# Patient Record
Sex: FEMALE | Race: OTHER | HISPANIC OR LATINO | Employment: OTHER | URBAN - METROPOLITAN AREA
[De-identification: names, ages, dates, MRNs, and addresses within clinical notes are randomized per-mention and may not be internally consistent; named-entity substitution may affect disease eponyms.]

---

## 2018-02-27 ENCOUNTER — APPOINTMENT (EMERGENCY)
Dept: CT IMAGING | Facility: HOSPITAL | Age: 68
End: 2018-02-27
Payer: COMMERCIAL

## 2018-02-27 ENCOUNTER — HOSPITAL ENCOUNTER (EMERGENCY)
Facility: HOSPITAL | Age: 68
Discharge: HOME | End: 2018-02-27
Attending: EMERGENCY MEDICINE | Admitting: EMERGENCY MEDICINE
Payer: COMMERCIAL

## 2018-02-27 VITALS
TEMPERATURE: 98.3 F | DIASTOLIC BLOOD PRESSURE: 68 MMHG | SYSTOLIC BLOOD PRESSURE: 134 MMHG | OXYGEN SATURATION: 97 % | RESPIRATION RATE: 18 BRPM | HEART RATE: 66 BPM

## 2018-02-27 DIAGNOSIS — N12 PYELONEPHRITIS: Primary | ICD-10-CM

## 2018-02-27 DIAGNOSIS — N30.90 CYSTITIS: ICD-10-CM

## 2018-02-27 LAB
ALBUMIN SERPL BCP-MCNC: 4.3 G/DL (ref 3.5–5)
ALP SERPL-CCNC: 114 U/L (ref 46–116)
ALT SERPL W P-5'-P-CCNC: 20 U/L (ref 12–78)
ANION GAP SERPL CALCULATED.3IONS-SCNC: 9 MMOL/L (ref 4–13)
AST SERPL W P-5'-P-CCNC: 26 U/L (ref 5–45)
BACTERIA UR QL AUTO: ABNORMAL /HPF
BASOPHILS # BLD AUTO: 0.05 THOUSANDS/ΜL (ref 0–0.1)
BASOPHILS NFR BLD AUTO: 1 % (ref 0–1)
BILIRUB SERPL-MCNC: 0.8 MG/DL (ref 0.2–1)
BILIRUB UR QL STRIP: NEGATIVE
BUN SERPL-MCNC: 11 MG/DL (ref 5–25)
CALCIUM SERPL-MCNC: 10.2 MG/DL (ref 8.3–10.1)
CHLORIDE SERPL-SCNC: 102 MMOL/L (ref 100–108)
CLARITY UR: ABNORMAL
CO2 SERPL-SCNC: 29 MMOL/L (ref 21–32)
COLOR UR: YELLOW
CREAT SERPL-MCNC: 0.82 MG/DL (ref 0.6–1.3)
EOSINOPHIL # BLD AUTO: 0.25 THOUSAND/ΜL (ref 0–0.61)
EOSINOPHIL NFR BLD AUTO: 5 % (ref 0–6)
ERYTHROCYTE [DISTWIDTH] IN BLOOD BY AUTOMATED COUNT: 13.8 % (ref 11.6–15.1)
GFR SERPL CREATININE-BSD FRML MDRD: 75 ML/MIN/1.73SQ M
GLUCOSE SERPL-MCNC: 86 MG/DL (ref 65–140)
GLUCOSE UR STRIP-MCNC: NEGATIVE MG/DL
HCT VFR BLD AUTO: 39 % (ref 34.8–46.1)
HGB BLD-MCNC: 13.4 G/DL (ref 11.5–15.4)
HGB UR QL STRIP.AUTO: ABNORMAL
KETONES UR STRIP-MCNC: NEGATIVE MG/DL
LEUKOCYTE ESTERASE UR QL STRIP: ABNORMAL
LIPASE SERPL-CCNC: 157 U/L (ref 73–393)
LYMPHOCYTES # BLD AUTO: 1.5 THOUSANDS/ΜL (ref 0.6–4.47)
LYMPHOCYTES NFR BLD AUTO: 30 % (ref 14–44)
MCH RBC QN AUTO: 28.2 PG (ref 26.8–34.3)
MCHC RBC AUTO-ENTMCNC: 34.4 G/DL (ref 31.4–37.4)
MCV RBC AUTO: 82 FL (ref 82–98)
MONOCYTES # BLD AUTO: 0.4 THOUSAND/ΜL (ref 0.17–1.22)
MONOCYTES NFR BLD AUTO: 8 % (ref 4–12)
NEUTROPHILS # BLD AUTO: 2.83 THOUSANDS/ΜL (ref 1.85–7.62)
NEUTS SEG NFR BLD AUTO: 56 % (ref 43–75)
NITRITE UR QL STRIP: POSITIVE
NON-SQ EPI CELLS URNS QL MICRO: ABNORMAL /HPF
NRBC BLD AUTO-RTO: 0 /100 WBCS
PH UR STRIP.AUTO: 7.5 [PH] (ref 4.5–8)
PLATELET # BLD AUTO: 205 THOUSANDS/UL (ref 149–390)
PMV BLD AUTO: 8.8 FL (ref 8.9–12.7)
POTASSIUM SERPL-SCNC: 4.7 MMOL/L (ref 3.5–5.3)
PROT SERPL-MCNC: 8.6 G/DL (ref 6.4–8.2)
PROT UR STRIP-MCNC: NEGATIVE MG/DL
RBC # BLD AUTO: 4.75 MILLION/UL (ref 3.81–5.12)
RBC #/AREA URNS AUTO: ABNORMAL /HPF
SODIUM SERPL-SCNC: 140 MMOL/L (ref 136–145)
SP GR UR STRIP.AUTO: 1.01 (ref 1–1.03)
UROBILINOGEN UR QL STRIP.AUTO: 0.2 E.U./DL
WBC # BLD AUTO: 5.03 THOUSAND/UL (ref 4.31–10.16)
WBC #/AREA URNS AUTO: ABNORMAL /HPF

## 2018-02-27 PROCEDURE — 87077 CULTURE AEROBIC IDENTIFY: CPT

## 2018-02-27 PROCEDURE — 99284 EMERGENCY DEPT VISIT MOD MDM: CPT

## 2018-02-27 PROCEDURE — 96365 THER/PROPH/DIAG IV INF INIT: CPT

## 2018-02-27 PROCEDURE — 96361 HYDRATE IV INFUSION ADD-ON: CPT

## 2018-02-27 PROCEDURE — 87086 URINE CULTURE/COLONY COUNT: CPT

## 2018-02-27 PROCEDURE — 80053 COMPREHEN METABOLIC PANEL: CPT | Performed by: EMERGENCY MEDICINE

## 2018-02-27 PROCEDURE — 96375 TX/PRO/DX INJ NEW DRUG ADDON: CPT

## 2018-02-27 PROCEDURE — 81002 URINALYSIS NONAUTO W/O SCOPE: CPT | Performed by: EMERGENCY MEDICINE

## 2018-02-27 PROCEDURE — 74177 CT ABD & PELVIS W/CONTRAST: CPT

## 2018-02-27 PROCEDURE — 85025 COMPLETE CBC W/AUTO DIFF WBC: CPT | Performed by: EMERGENCY MEDICINE

## 2018-02-27 PROCEDURE — 83690 ASSAY OF LIPASE: CPT | Performed by: EMERGENCY MEDICINE

## 2018-02-27 PROCEDURE — 81001 URINALYSIS AUTO W/SCOPE: CPT

## 2018-02-27 PROCEDURE — 36415 COLL VENOUS BLD VENIPUNCTURE: CPT | Performed by: EMERGENCY MEDICINE

## 2018-02-27 PROCEDURE — 87186 SC STD MICRODIL/AGAR DIL: CPT

## 2018-02-27 RX ORDER — MULTIVITAMIN
1 TABLET ORAL DAILY
COMMUNITY
End: 2018-07-15

## 2018-02-27 RX ORDER — KETOROLAC TROMETHAMINE 30 MG/ML
30 INJECTION, SOLUTION INTRAMUSCULAR; INTRAVENOUS ONCE
Status: COMPLETED | OUTPATIENT
Start: 2018-02-27 | End: 2018-02-27

## 2018-02-27 RX ORDER — CEPHALEXIN 500 MG/1
500 CAPSULE ORAL EVERY 8 HOURS SCHEDULED
Qty: 42 CAPSULE | Refills: 0 | Status: SHIPPED | OUTPATIENT
Start: 2018-02-27 | End: 2018-03-13 | Stop reason: ALTCHOICE

## 2018-02-27 RX ORDER — PHENAZOPYRIDINE HYDROCHLORIDE 200 MG/1
200 TABLET, FILM COATED ORAL 3 TIMES DAILY
Qty: 6 TABLET | Refills: 0 | Status: SHIPPED | OUTPATIENT
Start: 2018-02-27 | End: 2018-05-03 | Stop reason: SDUPTHER

## 2018-02-27 RX ADMIN — CEFTRIAXONE 1000 MG: 1 INJECTION, SOLUTION INTRAVENOUS at 19:45

## 2018-02-27 RX ADMIN — SODIUM CHLORIDE 1000 ML: 0.9 INJECTION, SOLUTION INTRAVENOUS at 18:25

## 2018-02-27 RX ADMIN — IOHEXOL 100 ML: 350 INJECTION, SOLUTION INTRAVENOUS at 19:08

## 2018-02-27 RX ADMIN — KETOROLAC TROMETHAMINE 30 MG: 30 INJECTION, SOLUTION INTRAMUSCULAR at 19:45

## 2018-02-27 NOTE — ED ATTENDING ATTESTATION
Lieutenant Ajay MD, saw and evaluated the patient  I have discussed the patient with the resident/non-physician practitioner and agree with the resident's/non-physician practitioner's findings, Plan of Care, and MDM as documented in the resident's/non-physician practitioner's note, except where noted  All available labs and Radiology studies were reviewed  At this point I agree with the current assessment done in the Emergency Department  I have conducted an independent evaluation of this patient a history and physical is as follows:    77 YO female presents with 3 months of foul smelling urine, burning with urination, itching with no rashes  States this has been getting worse  States she had flank pain starting 2 weeks ago, subjective fevers and chills  Gen: Pt is in NAD  HEENT: Head is atraumatic, EOM's intact, neck has FROM  Chest: CTAB, non-tender  Heart: RRR  Abdomen: Soft, NT/ND, Left greater than Right CVA tenderness  Musculoskeletal: FROM in all extremities  Skin: No rash, no ecchymosis  Neuro: Awake, alert, oriented x4; Cranial nerves II-XII intact  Psych: Normal affect    MDM - Will check urine for infection, kidney function, CT for kidney stone        Critical Care Time  CritCare Time    Procedures

## 2018-02-28 NOTE — DISCHARGE INSTRUCTIONS
Kidney Infection   WHAT YOU NEED TO KNOW:   A kidney infection, or pyelonephritis, is a bacterial infection  The infection usually starts in your bladder or urethra and moves into your kidney  One or both kidneys may be infected  DISCHARGE INSTRUCTIONS:   Return to the emergency department if:   · You have a fever and chills  · You cannot stop vomiting  · You have severe pain in your abdomen, lower back, or sides  Contact your healthcare provider if:   · You continue to have a fever after you take antibiotics for 3 days  · You have pain when you urinate, even after treatment  · Your signs and symptoms return  · You have questions or concerns about your condition or care  Medicines: You may  need any of the following:  · Antibiotics  treat your bacterial infection  · Acetaminophen  decreases pain and fever  It is available without a doctor's order  Ask how much to take and how often to take it  Follow directions  Read the labels of all other medicines you are using to see if they also contain acetaminophen, or ask your doctor or pharmacist  Acetaminophen can cause liver damage if not taken correctly  Do not use more than 4 grams (4,000 milligrams) total of acetaminophen in one day  · NSAIDs , such as ibuprofen, help decrease swelling, pain, and fever  This medicine is available with or without a doctor's order  NSAIDs can cause stomach bleeding or kidney problems in certain people  If you take blood thinner medicine, always ask if NSAIDs are safe for you  Always read the medicine label and follow directions  Do not give these medicines to children under 10months of age without direction from your child's healthcare provider  · Prescription pain medicine  may be given  Ask how to take this medicine safely  · Take your medicine as directed  Contact your healthcare provider if you think your medicine is not helping or if you have side effects   Tell him of her if you are allergic to any medicine  Keep a list of the medicines, vitamins, and herbs you take  Include the amounts, and when and why you take them  Bring the list or the pill bottles to follow-up visits  Carry your medicine list with you in case of an emergency  Drink liquids as directed: You may need to drink extra liquids to help flush your kidneys and urinary system  Water is the best liquid to drink  Ask your healthcare provider how much liquid to drink each day and which liquids are best for you  Urinate as soon as you feel the urge: This will help flush bacteria from your urinary system  Do not wait or hold your urine for too long  Clean your genital area every day with soap and water:  Wipe from front to back after you urinate or have a bowel movement  Wear cotton underwear  Fabrics such as nylon and polyester can stay damp  This can increase your risk for infection  Urinate within 15 minutes after you have sex  Follow up with your healthcare provider as directed:  Write down your questions so you remember to ask them during your visits  © 2017 [facility] Information is for End User's use only and may not be sold, redistributed or otherwise used for commercial purposes  All illustrations and images included in CareNotes® are the copyrighted property of A D A M , Inc  or Abilio Ariza  The above information is an  only  It is not intended as medical advice for individual conditions or treatments  Talk to your doctor, nurse or pharmacist before following any medical regimen to see if it is safe and effective for you

## 2018-02-28 NOTE — ED PROVIDER NOTES
History  Chief Complaint   Patient presents with    Flank Pain     B/L flank pain for 2 weeks, foul smelling urine, slight burning with urination  Denies fever  This is a 77 yo F who presents with urinary compliants and bilateral flank pain  Patient states for 3 months she has been having dysuria, foul smelling urine, itching around urethra  States for past 2 week bilateral flank pain left worse than right  States no fevers or chills  No abdominal pain  No nausea or vomiting  States her insurance in ending on the 1st so wanted to get evaluated  No vaginal discharge or bleeding  No chest pain or shortness of breath  Impression: 77 yo F with urinary complaints and back pain  Will get labs urine and CT scan  Treat sympatomatically most likely pyelonephritis             Prior to Admission Medications   Prescriptions Last Dose Informant Patient Reported? Taking? Multiple Vitamin (MULTIVITAMIN) tablet   Yes Yes   Sig: Take 1 tablet by mouth daily      Facility-Administered Medications: None       No past medical history on file  No past surgical history on file  No family history on file  I have reviewed and agree with the history as documented  Social History   Substance Use Topics    Smoking status: Not on file    Smokeless tobacco: Not on file    Alcohol use Not on file        Review of Systems   Constitutional: Negative  HENT: Negative  Eyes: Negative  Respiratory: Negative  Cardiovascular: Negative  Gastrointestinal: Negative  Endocrine: Negative  Genitourinary: Positive for dysuria, flank pain and urgency  Negative for vaginal discharge and vaginal pain  Neurological: Negative  Hematological: Negative  Psychiatric/Behavioral: Negative  All other systems reviewed and are negative        Physical Exam  ED Triage Vitals [02/27/18 1652]   Temperature Pulse Respirations Blood Pressure SpO2   98 3 °F (36 8 °C) 77 18 160/80 99 %      Temp Source Heart Rate Source Patient Position - Orthostatic VS BP Location FiO2 (%)   Temporal Monitor Sitting Right arm --      Pain Score       8           Orthostatic Vital Signs  Vitals:    02/27/18 1652 02/27/18 1949   BP: 160/80 134/68   Pulse: 77 66   Patient Position - Orthostatic VS: Sitting Lying       Physical Exam   Constitutional: She is oriented to person, place, and time  She appears well-developed and well-nourished  No distress  HENT:   Head: Normocephalic  Nose: Nose normal    Mouth/Throat: Oropharynx is clear and moist  No oropharyngeal exudate  Eyes: Conjunctivae and EOM are normal  Pupils are equal, round, and reactive to light  Neck: Normal range of motion  Neck supple  Cardiovascular: Normal rate, regular rhythm and normal heart sounds  No murmur heard  Pulmonary/Chest: Effort normal and breath sounds normal  No respiratory distress  Abdominal: Soft  Bowel sounds are normal  She exhibits no distension and no mass  There is no tenderness  There is no rebound and no guarding  Musculoskeletal: Normal range of motion  She exhibits no edema or deformity  Left cva tenderness   No rash, no midline tenderness     Neurological: She is alert and oriented to person, place, and time  Skin: Skin is warm  Capillary refill takes less than 2 seconds  She is not diaphoretic  Psychiatric: She has a normal mood and affect  Her behavior is normal    Vitals reviewed        ED Medications  Medications   sodium chloride 0 9 % bolus 1,000 mL (0 mL Intravenous Stopped 2/27/18 1915)   cefTRIAXone (ROCEPHIN) IVPB (premix) 1,000 mg (0 mg Intravenous Stopped 2/27/18 2045)   iohexol (OMNIPAQUE) 350 MG/ML injection (SINGLE-DOSE) 100 mL (100 mL Intravenous Given 2/27/18 1908)   ketorolac (TORADOL) injection 30 mg (30 mg Intravenous Given 2/27/18 1945)       Diagnostic Studies  Results Reviewed     Procedure Component Value Units Date/Time    Urine Microscopic [[id number]]  (Abnormal) Collected:  02/27/18 1815    Lab Status:  Final result Specimen:  Urine from Urine, Clean Catch Updated:  02/27/18 1849     RBC, UA 1-2 (A) /hpf      WBC, UA 10-20 (A) /hpf      Epithelial Cells Occasional /hpf      Bacteria, UA Moderate (A) /hpf     Urine culture [[id number]] Collected:  02/27/18 1815    Lab Status: In process Specimen:  Urine from Urine, Clean Catch Updated:  02/27/18 1849    Comprehensive metabolic panel [[id number]]  (Abnormal) Collected:  02/27/18 1825    Lab Status:  Final result Specimen:  Blood from Arm, Left Updated:  02/27/18 1847     Sodium 140 mmol/L      Potassium 4 7 mmol/L      Chloride 102 mmol/L      CO2 29 mmol/L      Anion Gap 9 mmol/L      BUN 11 mg/dL      Creatinine 0 82 mg/dL      Glucose 86 mg/dL      Calcium 10 2 (H) mg/dL      AST 26 U/L      ALT 20 U/L      Alkaline Phosphatase 114 U/L      Total Protein 8 6 (H) g/dL      Albumin 4 3 g/dL      Total Bilirubin 0 80 mg/dL      eGFR 75 ml/min/1 73sq m     Narrative:         National Kidney Disease Education Program recommendations are as follows:  GFR calculation is accurate only with a steady state creatinine  Chronic Kidney disease less than 60 ml/min/1 73 sq  meters  Kidney failure less than 15 ml/min/1 73 sq  meters      Lipase [[id number]]  (Normal) Collected:  02/27/18 1825    Lab Status:  Final result Specimen:  Blood from Arm, Left Updated:  02/27/18 1847     Lipase 157 u/L     CBC and differential [[id number]]  (Abnormal) Collected:  02/27/18 1825    Lab Status:  Final result Specimen:  Blood from Arm, Left Updated:  02/27/18 1832     WBC 5 03 Thousand/uL      RBC 4 75 Million/uL      Hemoglobin 13 4 g/dL      Hematocrit 39 0 %      MCV 82 fL      MCH 28 2 pg      MCHC 34 4 g/dL      RDW 13 8 %      MPV 8 8 (L) fL      Platelets 254 Thousands/uL      nRBC 0 /100 WBCs      Neutrophils Relative 56 %      Lymphocytes Relative 30 %      Monocytes Relative 8 %      Eosinophils Relative 5 %      Basophils Relative 1 %      Neutrophils Absolute 2 83 Thousands/µL      Lymphocytes Absolute 1 50 Thousands/µL      Monocytes Absolute 0 40 Thousand/µL      Eosinophils Absolute 0 25 Thousand/µL      Basophils Absolute 0 05 Thousands/µL     POCT urinalysis dipstick [[id number]]  (Abnormal) Resulted:  02/27/18 1825    Lab Status:  Final result Specimen:  Urine Updated:  02/27/18 1825    ED Urine Macroscopic [[id number]]  (Abnormal) Collected:  02/27/18 1815    Lab Status:  Final result Specimen:  Urine Updated:  02/27/18 1813     Color, UA Yellow     Clarity, UA Cloudy     pH, UA 7 5     Leukocytes, UA Moderate (A)     Nitrite, UA Positive (A)     Protein, UA Negative mg/dl      Glucose, UA Negative mg/dl      Ketones, UA Negative mg/dl      Urobilinogen, UA 0 2 E U /dl      Bilirubin, UA Negative     Blood, UA Trace (A)     Specific [place], UA 1 015    Narrative:       CLINITEK RESULT                 CT abdomen pelvis with contrast   Final Result by Anika Fletcher MD (02/27 1934)      Diffuse bladder wall thickening could relate to cystitis, correlate with urinalysis  There is markedly distended bladder, consider placement of portacatheter  Colonic diverticulosis  Pulmonary nodules as described above  Based on current Fleischner Society 2017 Guidelines on incidental pulmonary nodule, followup non-contrast CT is recommended at 6-12 months from the initial examination and, if stable at that time, an additional    followup is recommended for 18-24 months from the initial examination  Age-indeterminate superior endplate depression/compression of T11 and T9, worse at T9  Workstation performed: [id number]               Procedures  Procedures      Phone Consults  ED Phone Contact    ED Course  ED Course as of Feb 27 2323   Tue Feb 27, 2018   1851 WBC, UA: (!) 10-20   1943 Will discharge and advise to followup for lung nodule and compression of thoracic spine   If any worsening of symptoms return to the ED                                 MDM  CritCare Time    Disposition  Final diagnoses: Pyelonephritis   Cystitis     Time reflects when diagnosis was documented in both MDM as applicable and the Disposition within this note     Time User Action Codes Description Comment    2/27/2018  7:43 PM Mariela TaylorJanemari U  8  [N12] Pyelonephritis     2/27/2018  7:43 PM Mariela TaylorFahad  Miraclemaribel 61 Add [N30 90] Cystitis       ED Disposition     ED Disposition Condition Comment    Discharge  Bulmaro Hoskins discharge to home/self care  Condition at discharge: Good        Follow-up Information     Follow up With Specialties Details Why Contact Info    [facility]  Schedule an appointment as soon as possible for a visit to find a pcp and regarding incidental finding of lung nodule  [phone]      Gabby Shaw MD Orthopedic Surgery Schedule an appointment as soon as possible for a visit As needed for compression of back  [address]  [address] [place] [address]  [phone]          Discharge Medication List as of 2/27/2018  7:45 PM      START taking these medications    Details   cephalexin (KEFLEX) 500 mg capsule Take 1 capsule (500 mg total) by mouth every 8 (eight) hours for 14 days, Starting Tue 2/27/2018, Until Tue 3/13/2018, Print      phenazopyridine (PYRIDIUM) 200 mg tablet Take 1 tablet (200 mg total) by mouth 3 (three) times a day, Starting Tue 2/27/2018, Print         CONTINUE these medications which have NOT CHANGED    Details   Multiple Vitamin (MULTIVITAMIN) tablet Take 1 tablet by mouth daily, Historical Med           No discharge procedures on file  ED Provider  Attending physically available and evaluated Bulmaro Hoskins  BRYCE managed the patient along with the ED Attending      Electronically Signed by         Leva Bosworth, MD  02/27/18 7949

## 2018-03-01 LAB — BACTERIA UR CULT: ABNORMAL

## 2018-03-03 NOTE — PROGRESS NOTES
Called to see how patient was feeling while taking antibiotics and see if symptoms were resolving  Keflex is intermediate for treating bacteria  No answer   LMOM x1

## 2018-03-06 ENCOUNTER — APPOINTMENT (OUTPATIENT)
Dept: URGENT CARE | Facility: MEDICAL CENTER | Age: 68
End: 2018-03-06
Payer: MEDICARE

## 2018-03-06 ENCOUNTER — OFFICE VISIT (OUTPATIENT)
Dept: URGENT CARE | Facility: MEDICAL CENTER | Age: 68
End: 2018-03-06

## 2018-03-06 VITALS
TEMPERATURE: 98.7 F | DIASTOLIC BLOOD PRESSURE: 80 MMHG | SYSTOLIC BLOOD PRESSURE: 106 MMHG | WEIGHT: 137 LBS | OXYGEN SATURATION: 100 % | HEIGHT: 59 IN | RESPIRATION RATE: 18 BRPM | BODY MASS INDEX: 27.62 KG/M2 | HEART RATE: 60 BPM

## 2018-03-06 DIAGNOSIS — L63.9 ALOPECIA AREATA: Primary | ICD-10-CM

## 2018-03-06 NOTE — PATIENT INSTRUCTIONS
Scalp lesions are consistent with alopecia areata  Patient given reassurance  Patient given a list of dermatologist to refer to

## 2018-03-06 NOTE — PROGRESS NOTES
[facility]        NAME: Sherren Corrigan is a 78 y o  female  : 1950    MRN: [id number]  DATE: 2018  TIME: 3:09 PM    Assessment and Plan   Alopecia areata [L63 9]  1  Alopecia areata           Patient Instructions       Follow up with PCP in 3-5 days  Proceed to  ER if symptoms worsen  Chief Complaint     Chief Complaint   Patient presents with    Rash     vs bald spot at crown of head; no pruritis or pain         History of Present Illness       Patient here today with concerns about 2 bald spots in the back of her scalp that was noted by the patient's   Patient does not recall having any itching or tenderness were the ball spot of appear  She wishes to have them looked at  No previous history of her loss  Denies any recent history of stress  She informs me that she was recently treated for UTI  Rash         Review of Systems   Review of Systems   Constitutional: Negative  Skin: Positive for rash  Current Medications       Current Outpatient Prescriptions:     cephalexin (KEFLEX) 500 mg capsule, Take 1 capsule (500 mg total) by mouth every 8 (eight) hours for 14 days, Disp: 42 capsule, Rfl: 0    Multiple Vitamin (MULTIVITAMIN) tablet, Take 1 tablet by mouth daily, Disp: , Rfl:     phenazopyridine (PYRIDIUM) 200 mg tablet, Take 1 tablet (200 mg total) by mouth 3 (three) times a day, Disp: 6 tablet, Rfl: 0    Current Allergies     Allergies as of 2018 - Reviewed 2018   Allergen Reaction Noted    Ciprofloxacin  2018    Sulfa antibiotics  2018            The following portions of the patient's history were reviewed and updated as appropriate: allergies, current medications, past family history, past medical history, past social history, past surgical history and problem list      No past medical history on file  No past surgical history on file  No family history on file  Medications have been verified          Objective /80   Pulse 60   Temp 98 7 °F (37 1 °C)   Resp 18   Ht 4' 11" (1 499 m)   Wt 62 1 kg (137 lb)   SpO2 100%   BMI 27 67 kg/m²        Physical Exam     Physical Exam   Skin:   For test scalp reveals an area of alopecia measuring about 15 mm in size  The secondary area of alopecia measuring 5 mm is posterior to the 1st lesion  Findings are consistent with alopecia areata   Nursing note and vitals reviewed

## 2018-03-26 ENCOUNTER — OFFICE VISIT (OUTPATIENT)
Dept: FAMILY MEDICINE CLINIC | Facility: CLINIC | Age: 68
End: 2018-03-26
Payer: MEDICARE

## 2018-03-26 ENCOUNTER — APPOINTMENT (OUTPATIENT)
Dept: LAB | Facility: HOSPITAL | Age: 68
End: 2018-03-26
Payer: MEDICARE

## 2018-03-26 VITALS
TEMPERATURE: 98 F | HEART RATE: 60 BPM | WEIGHT: 135 LBS | BODY MASS INDEX: 31.24 KG/M2 | RESPIRATION RATE: 18 BRPM | SYSTOLIC BLOOD PRESSURE: 116 MMHG | HEIGHT: 55 IN | DIASTOLIC BLOOD PRESSURE: 70 MMHG

## 2018-03-26 DIAGNOSIS — Z12.39 BREAST CANCER SCREENING: ICD-10-CM

## 2018-03-26 DIAGNOSIS — F51.01 PRIMARY INSOMNIA: ICD-10-CM

## 2018-03-26 DIAGNOSIS — L63.9 ALOPECIA AREATA: ICD-10-CM

## 2018-03-26 DIAGNOSIS — K21.9 GASTROESOPHAGEAL REFLUX DISEASE WITHOUT ESOPHAGITIS: ICD-10-CM

## 2018-03-26 DIAGNOSIS — N30.00 ACUTE CYSTITIS WITHOUT HEMATURIA: Primary | ICD-10-CM

## 2018-03-26 DIAGNOSIS — E66.09 CLASS 1 OBESITY DUE TO EXCESS CALORIES WITHOUT SERIOUS COMORBIDITY WITH BODY MASS INDEX (BMI) OF 31.0 TO 31.9 IN ADULT: ICD-10-CM

## 2018-03-26 PROBLEM — E66.811 CLASS 1 OBESITY IN ADULT: Status: ACTIVE | Noted: 2018-03-26

## 2018-03-26 PROBLEM — E66.9 CLASS 1 OBESITY IN ADULT: Status: ACTIVE | Noted: 2018-03-26

## 2018-03-26 LAB
CHOLEST SERPL-MCNC: 220 MG/DL (ref 50–200)
HDLC SERPL-MCNC: 55 MG/DL (ref 40–60)
LDLC SERPL CALC-MCNC: 130 MG/DL (ref 0–100)
SL AMB  POCT GLUCOSE, UA: ABNORMAL
SL AMB LEUKOCYTE ESTERASE,UA: ABNORMAL
SL AMB POCT BILIRUBIN,UA: ABNORMAL
SL AMB POCT BLOOD,UA: 50
SL AMB POCT CLARITY,UA: ABNORMAL
SL AMB POCT COLOR,UA: YELLOW
SL AMB POCT KETONES,UA: 5
SL AMB POCT NITRITE,UA: ABNORMAL
SL AMB POCT PH,UA: 5
SL AMB POCT SPECIFIC GRAVITY,UA: 1.01
SL AMB POCT URINE PROTEIN: 30
SL AMB POCT UROBILINOGEN: 0.2
TRIGL SERPL-MCNC: 173 MG/DL
TSH SERPL DL<=0.05 MIU/L-ACNC: 2 UIU/ML (ref 0.36–3.74)

## 2018-03-26 PROCEDURE — 87077 CULTURE AEROBIC IDENTIFY: CPT | Performed by: NURSE PRACTITIONER

## 2018-03-26 PROCEDURE — 81002 URINALYSIS NONAUTO W/O SCOPE: CPT | Performed by: NURSE PRACTITIONER

## 2018-03-26 PROCEDURE — 87186 SC STD MICRODIL/AGAR DIL: CPT | Performed by: NURSE PRACTITIONER

## 2018-03-26 PROCEDURE — 87086 URINE CULTURE/COLONY COUNT: CPT | Performed by: NURSE PRACTITIONER

## 2018-03-26 PROCEDURE — 80061 LIPID PANEL: CPT | Performed by: NURSE PRACTITIONER

## 2018-03-26 PROCEDURE — 36415 COLL VENOUS BLD VENIPUNCTURE: CPT | Performed by: NURSE PRACTITIONER

## 2018-03-26 PROCEDURE — 84443 ASSAY THYROID STIM HORMONE: CPT | Performed by: NURSE PRACTITIONER

## 2018-03-26 PROCEDURE — 99204 OFFICE O/P NEW MOD 45 MIN: CPT | Performed by: NURSE PRACTITIONER

## 2018-03-26 RX ORDER — NITROFURANTOIN 25; 75 MG/1; MG/1
100 CAPSULE ORAL 2 TIMES DAILY
Qty: 14 CAPSULE | Refills: 0 | Status: SHIPPED | OUTPATIENT
Start: 2018-03-26 | End: 2018-04-02

## 2018-03-26 RX ORDER — OMEPRAZOLE 20 MG/1
20 CAPSULE, DELAYED RELEASE ORAL DAILY
Qty: 30 CAPSULE | Refills: 0 | Status: SHIPPED | OUTPATIENT
Start: 2018-03-26 | End: 2018-08-09 | Stop reason: SDUPTHER

## 2018-03-26 RX ORDER — CALCIUM CARBONATE 750 MG/1
1 TABLET, CHEWABLE ORAL DAILY
COMMUNITY
End: 2018-07-15

## 2018-03-26 RX ORDER — TRAZODONE HYDROCHLORIDE 50 MG/1
50 TABLET ORAL
Qty: 30 TABLET | Refills: 0 | Status: SHIPPED | OUTPATIENT
Start: 2018-03-26 | End: 2018-07-15

## 2018-03-26 NOTE — PROGRESS NOTES
Chief Complaint   Patient presents with    Follow-up     ER follow-up on urine infection  patient finished her antibotics but now her urine is back to being cloudy and vaginal itching    Back Pain     only when she is moving around  Assessment/Plan:    Gastroesophageal reflux disease without esophagitis  Was previous on Nexium for peptic ulcer but was resolved  Primary insomnia  Trial Trazodone for sleep  Diagnoses and all orders for this visit:    Acute cystitis without hematuria  -     POCT urine dip  -     Urine culture  -     nitrofurantoin (MACROBID) 100 mg capsule; Take 1 capsule (100 mg total) by mouth 2 (two) times a day for 7 days    Gastroesophageal reflux disease without esophagitis  -     omeprazole (PriLOSEC) 20 mg delayed release capsule; Take 1 capsule (20 mg total) by mouth daily    Primary insomnia  -     traZODone (DESYREL) 50 mg tablet; Take 1 tablet (50 mg total) by mouth daily at bedtime as needed for sleep  -     TSH, 3rd generation    Breast cancer screening  -     Mammo screening bilateral w 3d & cad; Future    Class 1 obesity due to excess calories without serious comorbidity with body mass index (BMI) of 31 0 to 31 9 in adult  -     Lipid panel  -     TSH, 3rd generation    Alopecia areata  -     TSH, 3rd generation    Other orders  -     calcium carbonate (TUMS EX) 750 mg chewable tablet; Chew 1 tablet daily          Subjective:      Patient ID: Dennis Garcia is a 78 y o  female  Patient states she is not sleeping well only getting 2-3 hours a night  She also states her  is giving her stress and she feels anxious  She states she has tried some over the counter medication such as melatonin  She was using diazepam when she was in [place] years ago  Back Pain   This is a new problem  The current episode started more than 1 month ago (5-6 weeks )  The problem occurs constantly   The pain is present in the lumbar spine and thoracic spine (left side)  Radiates to: stomache  The symptoms are aggravated by position and lying down  Associated symptoms include abdominal pain and pelvic pain (feels pinching )  Pertinent negatives include no bladder incontinence, chest pain, fever, leg pain, numbness or paresthesias  Risk factors include menopause (she states she fell a while ago 5 times and since then she had the pain come and go  )  She has tried NSAIDs for the symptoms  The treatment provided mild relief  Urinary Tract Infection    This is a recurrent problem  The current episode started more than 1 month ago  The quality of the pain is described as burning and stabbing  The pain is moderate  There has been no fever  She is not sexually active  There is a history of pyelonephritis  Pertinent negatives include no chills, nausea or vomiting  Associated symptoms comments: Itching   She has tried increased fluids for the symptoms  The treatment provided mild relief  Heartburn   She complains of abdominal pain and heartburn  She reports no chest pain or no nausea  back pain   This is a chronic problem  The problem occurs frequently  The problem has been waxing and waning  The heartburn is located in the back and LUQ  The heartburn wakes her from sleep  The symptoms are aggravated by lying down and certain foods  Pertinent negatives include no fatigue  Risk factors include hiatal hernia  She has tried a PPI, an antacid and a histamine-2 antagonist (famotidine did not work  ) for the symptoms  The treatment provided significant relief  Past procedures include an EGD (about a year ago; she was previously on nexium for ulcer )  The following portions of the patient's history were reviewed and updated as appropriate: allergies, current medications, past family history, past medical history, past social history, past surgical history and problem list     Review of Systems   Constitutional: Negative for chills, diaphoresis, fatigue and fever  Cardiovascular: Negative for chest pain  Gastrointestinal: Positive for abdominal pain and heartburn  Negative for nausea and vomiting  Genitourinary: Positive for pelvic pain (feels pinching )  Negative for bladder incontinence and vaginal discharge  Cloudy urine    Musculoskeletal: Positive for back pain  Neurological: Negative for dizziness, numbness and paresthesias  Psychiatric/Behavioral: Negative for sleep disturbance  Objective:      /70 (BP Location: Left arm, Patient Position: Sitting, Cuff Size: Adult)   Pulse 60   Temp 98 °F (36 7 °C) (Temporal)   Resp 18   Ht 4' 6 5" (1 384 m)   Wt 61 2 kg (135 lb)   Breastfeeding? No   BMI 31 96 kg/m²          Physical Exam   Constitutional: She is oriented to person, place, and time  She appears well-developed and well-nourished  HENT:   Head: Normocephalic and atraumatic  Nose: Nose normal    Mouth/Throat: Oropharynx is clear and moist    Neck: Normal range of motion  Cardiovascular: Normal rate, regular rhythm and normal heart sounds  No murmur heard  Pulmonary/Chest: Effort normal and breath sounds normal    Abdominal: Soft  Bowel sounds are normal  There is no tenderness  There is no CVA tenderness  Musculoskeletal: She exhibits no tenderness  Neurological: She is alert and oriented to person, place, and time  Skin: Skin is warm and dry

## 2018-03-26 NOTE — PATIENT INSTRUCTIONS
Problem List Items Addressed This Visit        Digestive    Gastroesophageal reflux disease without esophagitis     Was previous on Nexium for peptic ulcer but was resolved  Relevant Medications    calcium carbonate (TUMS EX) 750 mg chewable tablet    omeprazole (PriLOSEC) 20 mg delayed release capsule       Musculoskeletal and Integument    Alopecia areata    Relevant Orders    TSH, 3rd generation       Other    Primary insomnia     Trial Trazodone for sleep            Relevant Medications    traZODone (DESYREL) 50 mg tablet    Other Relevant Orders    TSH, 3rd generation    Class 1 obesity in adult    Relevant Orders    Lipid panel    TSH, 3rd generation      Other Visit Diagnoses     Acute cystitis without hematuria    -  Primary    Relevant Medications    nitrofurantoin (MACROBID) 100 mg capsule    Other Relevant Orders    POCT urine dip    Urine culture    Breast cancer screening        Relevant Orders    Mammo screening bilateral w 3d & cad

## 2018-03-27 ENCOUNTER — TELEPHONE (OUTPATIENT)
Dept: FAMILY MEDICINE CLINIC | Facility: CLINIC | Age: 68
End: 2018-03-27

## 2018-03-27 NOTE — TELEPHONE ENCOUNTER
Patient notified           ----- Message from [address] [place] [address] sent at 3/27/2018 12:11 PM EDT -----  Cholesterol slightly elevated  Monitor diet with low fats and continue to try to get more exercise  Thyroid was good

## 2018-03-28 LAB — BACTERIA UR CULT: ABNORMAL

## 2018-03-29 ENCOUNTER — TELEPHONE (OUTPATIENT)
Dept: FAMILY MEDICINE CLINIC | Facility: CLINIC | Age: 68
End: 2018-03-29

## 2018-03-29 DIAGNOSIS — N30.00 ACUTE CYSTITIS WITHOUT HEMATURIA: Primary | ICD-10-CM

## 2018-03-29 RX ORDER — CEFUROXIME AXETIL 250 MG/1
250 TABLET ORAL EVERY 12 HOURS SCHEDULED
Qty: 20 TABLET | Refills: 0 | Status: SHIPPED | OUTPATIENT
Start: 2018-03-29 | End: 2018-04-08

## 2018-03-29 NOTE — TELEPHONE ENCOUNTER
----- Message from Kaylin Aldana sent at 3/29/2018 10:57 AM EDT -----  Urine culture shows better to use ceftin than the antibiotic she is on  Since she having nausea with macrobid  I will send new rx for ceftin instead

## 2018-03-29 NOTE — TELEPHONE ENCOUNTER
Pt  Made aware that Ceftin was sent into pharmacy to take in the place of the Macrobid based on the urine culture results

## 2018-05-03 ENCOUNTER — OFFICE VISIT (OUTPATIENT)
Dept: FAMILY MEDICINE CLINIC | Facility: CLINIC | Age: 68
End: 2018-05-03
Payer: COMMERCIAL

## 2018-05-03 VITALS
RESPIRATION RATE: 18 BRPM | SYSTOLIC BLOOD PRESSURE: 138 MMHG | HEART RATE: 64 BPM | DIASTOLIC BLOOD PRESSURE: 86 MMHG | HEIGHT: 55 IN | WEIGHT: 136 LBS | BODY MASS INDEX: 31.47 KG/M2 | TEMPERATURE: 97.8 F

## 2018-05-03 DIAGNOSIS — R35.0 URINARY FREQUENCY: Primary | ICD-10-CM

## 2018-05-03 DIAGNOSIS — N39.0 RECURRENT UTI: ICD-10-CM

## 2018-05-03 LAB
SL AMB  POCT GLUCOSE, UA: NEGATIVE
SL AMB LEUKOCYTE ESTERASE,UA: ABNORMAL
SL AMB POCT BILIRUBIN,UA: NEGATIVE
SL AMB POCT BLOOD,UA: ABNORMAL
SL AMB POCT CLARITY,UA: ABNORMAL
SL AMB POCT COLOR,UA: YELLOW
SL AMB POCT KETONES,UA: NEGATIVE
SL AMB POCT NITRITE,UA: ABNORMAL
SL AMB POCT PH,UA: 7
SL AMB POCT SPECIFIC GRAVITY,UA: 1.01
SL AMB POCT URINE PROTEIN: ABNORMAL
SL AMB POCT UROBILINOGEN: NEGATIVE

## 2018-05-03 PROCEDURE — 81002 URINALYSIS NONAUTO W/O SCOPE: CPT | Performed by: NURSE PRACTITIONER

## 2018-05-03 PROCEDURE — 1101F PT FALLS ASSESS-DOCD LE1/YR: CPT | Performed by: NURSE PRACTITIONER

## 2018-05-03 PROCEDURE — 99213 OFFICE O/P EST LOW 20 MIN: CPT | Performed by: NURSE PRACTITIONER

## 2018-05-03 PROCEDURE — 87077 CULTURE AEROBIC IDENTIFY: CPT | Performed by: NURSE PRACTITIONER

## 2018-05-03 PROCEDURE — 87147 CULTURE TYPE IMMUNOLOGIC: CPT | Performed by: NURSE PRACTITIONER

## 2018-05-03 PROCEDURE — 87086 URINE CULTURE/COLONY COUNT: CPT | Performed by: NURSE PRACTITIONER

## 2018-05-03 PROCEDURE — 87186 SC STD MICRODIL/AGAR DIL: CPT | Performed by: NURSE PRACTITIONER

## 2018-05-03 RX ORDER — CEFUROXIME AXETIL 500 MG/1
500 TABLET ORAL EVERY 12 HOURS SCHEDULED
Qty: 20 TABLET | Refills: 0 | Status: SHIPPED | OUTPATIENT
Start: 2018-05-03 | End: 2018-05-13

## 2018-05-03 RX ORDER — FLUCONAZOLE 150 MG/1
150 TABLET ORAL ONCE
Qty: 1 TABLET | Refills: 0 | Status: SHIPPED | OUTPATIENT
Start: 2018-05-03 | End: 2018-05-03

## 2018-05-03 RX ORDER — PHENAZOPYRIDINE HYDROCHLORIDE 200 MG/1
200 TABLET, FILM COATED ORAL 3 TIMES DAILY
Qty: 21 TABLET | Refills: 0 | Status: SHIPPED | OUTPATIENT
Start: 2018-05-03 | End: 2018-05-10

## 2018-05-03 NOTE — PROGRESS NOTES
Chief Complaint   Patient presents with    Urinary Frequency     Patient present today for urinary frequency, vaginal itchiness, vagina pressure, pain and burning for more than a month  Per patient she has been taking antibiotics but right after she finished the antibiotic the symptoms started again  Assessment/Plan:    Recurrent UTI  Patient initially seen by ER 2/27/18 for pyelonephritis; IM ceftriaxone sent home PO keflex for 14 days  Then she has reoccurrence 3/26/18; Belgian Habermann for 7 days but patient unable to tolerate nausea as side effect  Sensitivity showed better response to Ceftin  RX change after 3 days fo Macrobid to Ceftin for 10 days  Today she has reoccurrence of symptoms  Pending culture; Ceftin rx, ref to urogyn  Diagnoses and all orders for this visit:    Urinary frequency  -     POCT urine dip  -     Ambulatory referral to Urogynecology; Future  -     Urine culture  -     cefuroxime (CEFTIN) 500 mg tablet; Take 1 tablet (500 mg total) by mouth every 12 (twelve) hours for 10 days  -     fluconazole (DIFLUCAN) 150 mg tablet; Take 1 tablet (150 mg total) by mouth once for 1 dose  -     phenazopyridine (PYRIDIUM) 200 mg tablet; Take 1 tablet (200 mg total) by mouth 3 (three) times a day for 7 days    Recurrent UTI  -     Ambulatory referral to Urogynecology; Future  -     Urine culture  -     cefuroxime (CEFTIN) 500 mg tablet; Take 1 tablet (500 mg total) by mouth every 12 (twelve) hours for 10 days  -     fluconazole (DIFLUCAN) 150 mg tablet; Take 1 tablet (150 mg total) by mouth once for 1 dose  -     phenazopyridine (PYRIDIUM) 200 mg tablet; Take 1 tablet (200 mg total) by mouth 3 (three) times a day for 7 days          Subjective:      Patient ID: Doug Tubbs is a 78 y o  female  Patient presents with recurrent uti despite significant antibiotic treatment  Most recently she did complete a course of antibiotics and within  3 days symptoms reoccured       She has positive urine DIP        Urinary Frequency    This is a recurrent problem  The problem occurs every urination  The problem has been gradually worsening  The quality of the pain is described as burning and aching  There has been no fever  Associated symptoms include flank pain, frequency and urgency  Pertinent negatives include no chills, discharge, hematuria, nausea or vomiting  Associated symptoms comments: Vaginal itching and burning    She has tried antibiotics for the symptoms  The treatment provided mild relief  Her past medical history is significant for recurrent UTIs  The following portions of the patient's history were reviewed and updated as appropriate: allergies, current medications, past family history, past medical history, past social history, past surgical history and problem list     Review of Systems   Constitutional: Negative for chills, diaphoresis, fatigue and fever  Respiratory: Negative for chest tightness  Gastrointestinal: Positive for abdominal pain  Negative for nausea and vomiting  Genitourinary: Positive for flank pain, frequency and urgency  Negative for hematuria  Objective:      /86 (BP Location: Left arm, Patient Position: Sitting, Cuff Size: Standard)   Pulse 64   Temp 97 8 °F (36 6 °C) (Temporal)   Resp 18   Ht 4' 6 5" (1 384 m)   Wt 61 7 kg (136 lb)   BMI 32 19 kg/m²          Physical Exam   Constitutional: She appears well-developed and well-nourished  Cardiovascular: Normal rate and regular rhythm  Pulmonary/Chest: Effort normal and breath sounds normal    Abdominal: Soft  Bowel sounds are normal  There is tenderness in the suprapubic area  There is no CVA tenderness

## 2018-05-03 NOTE — ASSESSMENT & PLAN NOTE
Patient initially seen by ER 2/27/18 for pyelonephritis; IM ceftriaxone sent home PO keflex for 14 days  Then she has reoccurrence 3/26/18; Remigio Hals for 7 days but patient unable to tolerate nausea as side effect  Sensitivity showed better response to Ceftin  RX change after 3 days fo Macrobid to Ceftin for 10 days  Today she has reoccurrence of symptoms  Pending culture; Ceftin rx, ref to urogyn

## 2018-05-05 LAB
BACTERIA UR CULT: ABNORMAL
BACTERIA UR CULT: ABNORMAL

## 2018-05-07 ENCOUNTER — TELEPHONE (OUTPATIENT)
Dept: FAMILY MEDICINE CLINIC | Facility: CLINIC | Age: 68
End: 2018-05-07

## 2018-05-07 NOTE — TELEPHONE ENCOUNTER
LVMTCB for results    ----- Message from [address] [place] [address] sent at 5/7/2018 10:51 AM EDT -----  Urine showed bacteria  The antibiotic she has is good to take   Can we call lab and request additional sensitivity to be done on the beta hemolytic Strep B

## 2018-06-25 ENCOUNTER — APPOINTMENT (OUTPATIENT)
Dept: LAB | Facility: HOSPITAL | Age: 68
End: 2018-06-25
Payer: COMMERCIAL

## 2018-06-25 ENCOUNTER — OFFICE VISIT (OUTPATIENT)
Dept: FAMILY MEDICINE CLINIC | Facility: CLINIC | Age: 68
End: 2018-06-25
Payer: COMMERCIAL

## 2018-06-25 VITALS
HEART RATE: 60 BPM | DIASTOLIC BLOOD PRESSURE: 80 MMHG | TEMPERATURE: 98 F | SYSTOLIC BLOOD PRESSURE: 102 MMHG | HEIGHT: 55 IN | WEIGHT: 136 LBS | BODY MASS INDEX: 31.47 KG/M2 | RESPIRATION RATE: 18 BRPM

## 2018-06-25 DIAGNOSIS — R29.890 LOSS OF HEIGHT: ICD-10-CM

## 2018-06-25 DIAGNOSIS — Z23 NEED FOR PNEUMOCOCCAL VACCINATION: ICD-10-CM

## 2018-06-25 DIAGNOSIS — L63.9 ALOPECIA AREATA: ICD-10-CM

## 2018-06-25 DIAGNOSIS — Z12.11 COLON CANCER SCREENING: ICD-10-CM

## 2018-06-25 DIAGNOSIS — N39.0 RECURRENT UTI: ICD-10-CM

## 2018-06-25 DIAGNOSIS — Z11.59 NEED FOR HEPATITIS C SCREENING TEST: ICD-10-CM

## 2018-06-25 DIAGNOSIS — Z00.00 MEDICARE ANNUAL WELLNESS VISIT, INITIAL: Primary | ICD-10-CM

## 2018-06-25 DIAGNOSIS — R91.1 INCIDENTAL LUNG NODULE, > 3MM AND < 8MM: ICD-10-CM

## 2018-06-25 DIAGNOSIS — Z78.0 POST-MENOPAUSAL: ICD-10-CM

## 2018-06-25 LAB
SL AMB  POCT GLUCOSE, UA: ABNORMAL
SL AMB LEUKOCYTE ESTERASE,UA: ABNORMAL
SL AMB POCT BILIRUBIN,UA: ABNORMAL
SL AMB POCT BLOOD,UA: ABNORMAL
SL AMB POCT CLARITY,UA: ABNORMAL
SL AMB POCT COLOR,UA: YELLOW
SL AMB POCT KETONES,UA: ABNORMAL
SL AMB POCT NITRITE,UA: ABNORMAL
SL AMB POCT PH,UA: 9
SL AMB POCT SPECIFIC GRAVITY,UA: 1
SL AMB POCT URINE PROTEIN: 2000
SL AMB POCT UROBILINOGEN: 0.2

## 2018-06-25 PROCEDURE — 87181 SC STD AGAR DILUTION PER AGT: CPT | Performed by: NURSE PRACTITIONER

## 2018-06-25 PROCEDURE — 81002 URINALYSIS NONAUTO W/O SCOPE: CPT | Performed by: NURSE PRACTITIONER

## 2018-06-25 PROCEDURE — 99213 OFFICE O/P EST LOW 20 MIN: CPT | Performed by: NURSE PRACTITIONER

## 2018-06-25 PROCEDURE — 86803 HEPATITIS C AB TEST: CPT | Performed by: NURSE PRACTITIONER

## 2018-06-25 PROCEDURE — G0438 PPPS, INITIAL VISIT: HCPCS | Performed by: NURSE PRACTITIONER

## 2018-06-25 PROCEDURE — 36415 COLL VENOUS BLD VENIPUNCTURE: CPT | Performed by: NURSE PRACTITIONER

## 2018-06-25 PROCEDURE — 90471 IMMUNIZATION ADMIN: CPT | Performed by: NURSE PRACTITIONER

## 2018-06-25 PROCEDURE — 87086 URINE CULTURE/COLONY COUNT: CPT | Performed by: NURSE PRACTITIONER

## 2018-06-25 PROCEDURE — 87147 CULTURE TYPE IMMUNOLOGIC: CPT | Performed by: NURSE PRACTITIONER

## 2018-06-25 PROCEDURE — 90670 PCV13 VACCINE IM: CPT | Performed by: NURSE PRACTITIONER

## 2018-06-25 PROCEDURE — 3725F SCREEN DEPRESSION PERFORMED: CPT | Performed by: NURSE PRACTITIONER

## 2018-06-25 RX ORDER — CEFUROXIME AXETIL 500 MG/1
500 TABLET ORAL EVERY 12 HOURS SCHEDULED
Qty: 20 TABLET | Refills: 0 | Status: SHIPPED | OUTPATIENT
Start: 2018-06-25 | End: 2018-07-05

## 2018-06-25 NOTE — PROGRESS NOTES
Chief Complaint   Patient presents with    Medicare Wellness Visit    Urinary Tract Infection       Assessment/Plan:    Incidental lung nodule, > 3mm and < 8mm  7 mm pulmonary nodule found on incidental CT  Needs follow up scan  Recurrent UTI  patient appointment with Uro GYN August 1st      Loss of height  No recent DEXA can be found  Diagnoses and all orders for this visit:    Medicare annual wellness visit, initial  -     Pneumococcal Conjugate Vaccine 13-valent IM    Colon cancer screening  -     Ambulatory referral to Gastroenterology; Future    Need for hepatitis C screening test  -     Hepatitis C antibody    Need for pneumococcal vaccination  -     Pneumococcal Conjugate Vaccine 13-valent IM    Recurrent UTI  -     POCT urine dip  -     Urine culture  -     cefuroxime (CEFTIN) 500 mg tablet; Take 1 tablet (500 mg total) by mouth every 12 (twelve) hours for 10 days    Alopecia areata  -     Ambulatory referral to Dermatology; Future    Incidental lung nodule, > 3mm and < 8mm  -     CT chest w contrast; Future    Loss of height  -     DXA bone density spine hip and pelvis; Future    Post-menopausal  -     DXA bone density spine hip and pelvis; Future          Subjective:      Patient ID: Marcus Cuevas is a 78 y o  female  Patient here for UTI symptoms that started a week ago  Patient has history of multiple recurrent UTIs; we have made a referral to urogyn and she has an appointment in August  We will continue to support patient until this appointment  Patient states she over due for her PAP as well  She also states she has concerns because with all her office visits she keeps loosing height an feel more bent over  Patient also having sustained back pain  She states when she lays flat her back pain is worse it radiates to the front near the epigastric area  This has been present foe years but recently worsened in the last few months   She also states there was an abnormality on her lungs which she not sure what to do for follow up  The following portions of the patient's history were reviewed and updated as appropriate: allergies, current medications, past family history, past medical history, past social history, past surgical history and problem list     Review of Systems   Constitutional: Positive for chills and fatigue  Negative for diaphoresis and fever  Respiratory: Negative for cough, chest tightness and shortness of breath  Cardiovascular: Negative for chest pain and palpitations  Endocrine: Negative for polydipsia, polyphagia and polyuria  Genitourinary: Positive for dysuria and frequency  Negative for pelvic pain  Musculoskeletal: Positive for back pain and myalgias  Neurological: Negative for headaches  Objective:      /80 (BP Location: Left arm, Patient Position: Sitting, Cuff Size: Adult)   Pulse 60   Temp 98 °F (36 7 °C) (Temporal)   Resp 18   Ht 4' 6 75" (1 391 m)   Wt 61 7 kg (136 lb)   Breastfeeding? No   BMI 31 90 kg/m²          Physical Exam   Constitutional: She is oriented to person, place, and time  Vital signs are normal  She appears well-developed and well-nourished  She does not appear ill  HENT:   Head: Normocephalic and atraumatic  Cardiovascular: Normal rate, regular rhythm, S1 normal, S2 normal and normal heart sounds  No murmur heard  Pulmonary/Chest: Effort normal and breath sounds normal  No respiratory distress  Abdominal: Soft  Bowel sounds are normal  She exhibits no distension  There is tenderness in the suprapubic area and left lower quadrant  There is no CVA tenderness  Musculoskeletal:        Cervical back: She exhibits decreased range of motion (kyphosis)  Neurological: She is alert and oriented to person, place, and time

## 2018-06-25 NOTE — PROGRESS NOTES
Assessment and Plan:    Problem List Items Addressed This Visit        Musculoskeletal and Integument    Alopecia areata       Genitourinary    Recurrent UTI    Relevant Orders    POCT urine dip    Urine culture       Other    Incidental lung nodule, > 3mm and < 8mm    Loss of height    Post-menopausal      Other Visit Diagnoses     Medicare annual wellness visit, initial    -  Primary    Colon cancer screening        Need for hepatitis C screening test        Relevant Orders    Hepatitis C antibody    Need for pneumococcal vaccination            Health Maintenance Due   Topic Date Due    Hepatitis C Screening  1950    CRC Screening: Colonoscopy  1950    DTaP,Tdap,and Td Vaccines (1 - Tdap) 08/28/1972    PNEUMOCOCCAL POLYSACCHARIDE VACCINE AGE 72 AND OVER  08/28/2016         HPI:  Lul Cook is a 78 y o  female here for her Initial Wellness Visit      Patient Active Problem List   Diagnosis    Gastroesophageal reflux disease without esophagitis    Primary insomnia    Class 1 obesity in adult    Alopecia areata    Urinary frequency    Recurrent UTI    Incidental lung nodule, > 3mm and < 8mm    Loss of height    Post-menopausal     Past Medical History:   Diagnosis Date    Class 1 obesity in adult 3/26/2018    Gastroesophageal reflux disease without esophagitis 3/26/2018    Recurrent UTI 5/3/2018     Past Surgical History:   Procedure Laterality Date    HERNIA REPAIR      TUBAL LIGATION       Family History   Problem Relation Age of Onset    Diabetes Mother      History   Smoking Status    Never Smoker   Smokeless Tobacco    Never Used     History   Alcohol Use No      History   Drug Use No       Current Outpatient Prescriptions   Medication Sig Dispense Refill    Multiple Vitamin (MULTIVITAMIN) tablet Take 1 tablet by mouth daily      omeprazole (PriLOSEC) 20 mg delayed release capsule Take 1 capsule (20 mg total) by mouth daily 30 capsule 0    traZODone (DESYREL) 50 mg tablet Take 1 tablet (50 mg total) by mouth daily at bedtime as needed for sleep 30 tablet 0    calcium carbonate (TUMS EX) 750 mg chewable tablet Chew 1 tablet daily       No current facility-administered medications for this visit  Allergies   Allergen Reactions    Ciprofloxacin Shortness Of Breath    Sulfa Antibiotics Shortness Of Breath     Stress and rash  There is no immunization history on file for this patient      Patient Care Team:  Tobi Villaseñor MD as PCP - General (Family Medicine)      Medicare Screening Tests and Risk Assessments:  AWV Clinical     ISAR:       Once in a Lifetime Medicare Screening:   EKG performed?:  No        Medicare Screening Tests and Risk Assessment:   AAA Risk Assessment    None Indicated:  Yes    Osteoporosis Risk Assessment     Female:  Yes   :  No :  No   Age over 48:  Yes Low body weight (<127lbs):  No   Tobacco use:  No Alcohol use:  No   Low calcium diet:  No PMHX of fractures:  No   FHX of fractures:  No    HIV Risk Assessment    None indicated:  Yes        Drug and Alcohol Use:   Tobacco use    Tobacco use duration    Tobacco Cessation Readiness    Alcohol use    Alcohol Treatment Readiness   Illicit Drug Use        Diet & Exercise:   Diet   What is your diet?:  Regular   How many servings a day of the following:   Fruits and Vegetables:  1-2 Meat:  0   Whole Grains:  0 Simple Carbs:  0   Dairy:  1 Soda:  0   Coffee:  1 Tea:  0   Exercise    Do you currently exercise?:  currently not exercising       Cognitive Impairment Screening:   Depression screening preformed:  Yes     PHQ-9 Depression scale score:  0   Depression screening results:  no significant symptoms   Cognitive Impairment Screening    Do you have difficulty learning or retaining new information?:  No Do you have difficulty handling new tasks?:  No   Do you have difficulty with reasoning?:  No Do you have difficulty with spatial ability and orientation?:  No   Do you have difficulty with language?:  No Do you have difficulty with behavior?:  No       Functional Ability/Level of Safety:   Hearing    Hearing difficulties:  No Bilateral:  normal   Left:  normal    Hearing Impairment Assessment    Hearing status:  No impairment   Do your family members ever complain that you turn on the radio or T V  too loudly?:  No Do you find that other people have to repeat themselves when talking to you?:  No   Do you have difficulty hearing while talking on the phone?:  No Has anyone ever told you that you are speaking too loudly when talking with them?:  No   Do you have trouble hearing the doorbell or phone ringing?:  No Do you have difficulty hearing such that you feel frustrated talking to people?:  No   Do you feel sad because you cannot hear well?:  No Do you feel inconvienced due to your hearing problem?:  No   Current Activities    Status:  no driving, limited IADL's, limited ADL's, limited social activities   Help needed with the folllowing:    Using the phone:  No Transportation:  Yes   Shopping:  No Preparing Meals:  No   Doing Housework:  No Doing Laundry:  No   Managing Medications:  No Managing Money:  No   ADL    Feeding:  Independant   Oral hygiene and Facial grooming:  Independant   Bathing:  Independant   Upper Body Dressing:  Independant   Lower Body Dressing:  Independant   Toileting:  Independant   Bed Mobility:  Independant   Fall Risk   Have you fallen in the last 12 months?:  No Are you unsteady on your feet?:  Yes    Are you taking any medications that may cause fatigue or dizziness?:  No    Do you rush to the bathroom potentially risking a fall?:  No   Injury History   Previous Fall:  No Alcohol Use:  No    Visual Impairment:  Yes   Cogitive Impairment:  No    Postural Hypotension:  No        Home Safety:   Are there hazards in your environment?:  No   If you fell, would you need help to get back up from the ground?:  No Do you have problems or concerns getting in/out of a bed, chair, tub, or toilet?:  No   Do you feel unsteady when walking?:  Yes Is your activity limited by pain?:  No   Do you have handrails and grab-bars in the home?:  Yes Are emergency numbers kept by the phone and regularly updated?:  No   Are you and/or family members aware of the dangers of smoking in bed?:  Yes Are firearms stored securely?:  No   Do you have working smoke alarms and fire extinguisher?:  Yes Do all household members know how to use them?:  No   Have you left the stove on unsupervised?:  No    Home Safety Risk Factors   Unfamilar with surroundings:  No Uneven floors:  No   Stairs or handrail saftey risk:  Yes    Household clutter:  No Poor household lighting:  No   No grab bars in bathroom:  No Further evaluation needed:  No       Advanced Directives:   Advanced Directives    Living Will:  Yes Durable POA for healthcare:   Yes   Advanced directive:  Yes    Patient's End of Life Decisions        Urinary Incontinence:   Do you have urinary incontinence?:  No Do you have incomplete emptying?:  No   Do you urinate frequently?:  No Do you have urinary urgency?:  No   Do you have urinary hesitancy?:  No Do you have dysuria (painful and/or difficult urination)?:  No   Do you have nocturia (waking up to urinate)?:  No Do you strain when urinating (have to push to urinate)?:  No   Do you have a weak stream when urinating?:  No Do you have intermittent streaming when urinating?:  No   Do you dribble urine after finishing?:  No    Do you have vaginal pressure?:  No Do you have vaginal dryness?:  No       Glaucoma:            Provider Screening    No data filed

## 2018-06-26 LAB — HCV AB SER QL: NORMAL

## 2018-06-27 ENCOUNTER — TELEPHONE (OUTPATIENT)
Dept: FAMILY MEDICINE CLINIC | Facility: CLINIC | Age: 68
End: 2018-06-27

## 2018-06-27 NOTE — TELEPHONE ENCOUNTER
Yes I know but on the report it says if I need additional sensitivies to call the lab   I need additional sensitivities

## 2018-06-27 NOTE — TELEPHONE ENCOUNTER
I called [facility] you have to send over a form stating that you want the sensitivity   Fax# [phone]

## 2018-06-27 NOTE — TELEPHONE ENCOUNTER
----- Message from [address] [place] [address] sent at 6/26/2018  9:42 PM EDT -----  Patient hep c negative  Please call [facility] lab to ask for additional sensitivities on urine culture

## 2018-06-27 NOTE — TELEPHONE ENCOUNTER
I called [facility] lab they state the reason you didn't get a sensitivity test is because they dont send one for beta strep b  Send back to the front to call the patient with results

## 2018-06-29 LAB — BACTERIA UR CULT: ABNORMAL

## 2018-07-06 ENCOUNTER — HOSPITAL ENCOUNTER (OUTPATIENT)
Dept: CT IMAGING | Facility: HOSPITAL | Age: 68
Discharge: HOME | End: 2018-07-06
Payer: COMMERCIAL

## 2018-07-06 DIAGNOSIS — R91.1 INCIDENTAL LUNG NODULE, > 3MM AND < 8MM: ICD-10-CM

## 2018-07-06 PROCEDURE — 71250 CT THORAX DX C-: CPT

## 2018-07-10 ENCOUNTER — TELEPHONE (OUTPATIENT)
Dept: FAMILY MEDICINE CLINIC | Facility: CLINIC | Age: 68
End: 2018-07-10

## 2018-07-10 NOTE — TELEPHONE ENCOUNTER
----- Message from [address] [place] [address] sent at 7/9/2018 11:49 PM EDT -----  No change on pulmonary CT nodules  Recommend follow up scan in 12 months

## 2018-07-15 ENCOUNTER — HOSPITAL ENCOUNTER (EMERGENCY)
Facility: HOSPITAL | Age: 68
Discharge: HOME | End: 2018-07-15
Attending: EMERGENCY MEDICINE | Admitting: EMERGENCY MEDICINE
Payer: COMMERCIAL

## 2018-07-15 VITALS
BODY MASS INDEX: 32.2 KG/M2 | RESPIRATION RATE: 16 BRPM | WEIGHT: 137.3 LBS | TEMPERATURE: 99 F | OXYGEN SATURATION: 99 % | HEART RATE: 82 BPM | DIASTOLIC BLOOD PRESSURE: 76 MMHG | SYSTOLIC BLOOD PRESSURE: 155 MMHG

## 2018-07-15 DIAGNOSIS — N39.0 UTI (URINARY TRACT INFECTION): Primary | ICD-10-CM

## 2018-07-15 LAB

## 2018-07-15 PROCEDURE — 99283 EMERGENCY DEPT VISIT LOW MDM: CPT

## 2018-07-15 PROCEDURE — 87147 CULTURE TYPE IMMUNOLOGIC: CPT

## 2018-07-15 PROCEDURE — 81001 URINALYSIS AUTO W/SCOPE: CPT

## 2018-07-15 PROCEDURE — 87086 URINE CULTURE/COLONY COUNT: CPT

## 2018-07-15 RX ORDER — CEFPODOXIME PROXETIL 200 MG/1
200 TABLET, FILM COATED ORAL EVERY 12 HOURS SCHEDULED
Qty: 20 TABLET | Refills: 0 | Status: SHIPPED | OUTPATIENT
Start: 2018-07-15 | End: 2018-07-25

## 2018-07-15 RX ORDER — PHENAZOPYRIDINE HYDROCHLORIDE 200 MG/1
200 TABLET, FILM COATED ORAL EVERY 8 HOURS PRN
Qty: 6 TABLET | Refills: 0 | Status: SHIPPED | OUTPATIENT
Start: 2018-07-15 | End: 2018-07-17

## 2018-07-16 NOTE — DISCHARGE INSTRUCTIONS
Infección en el tracto urinario en adultos mayores   LO QUE NECESITA SABER:   Juan infección en el tracto urinario (ITU) ocurre cuando entran bacterias en king tracto urinario  King tracto urinario incluye fabby riñones, uréteres, vejiga y [place]regnies  La orina es producida en los riñones y fluye del uréter a la vejiga  La orina sale de la vejiga a través de la uretra  Juan infección del tracto urinario es más común in [place] parte inferior de king tracto urinario que incluye king vejiga y uretra  INSTRUCCIONES SOBRE EL SOREN HOSPITALARIA:   Regrese a la ally de emergencias si:   · Usted está orinando muy poco o nada en absoluto  · Usted esta vomitando  · Usted tiene fiebre soren con temblor y escalofríos  · Usted tiene dolor en el costado o en la espalda que RONIT  Pregúntele a king Coleen Punch vitaminas y minerales son adecuados para usted  · Usted tiene fiebre  · Es [place], y el flujo vaginal morin o amarillo ha aumentado  · Usted no siente mejoría después de 2 días de fernandez los antibióticos  · Usted tiene preguntas o inquietudes acerca de king condición o cuidado  Medicamentos:   · Medicamentos,  ayudan a tratar la infección bacterial o disminuir el dolor y la quemazón cuando usted [place]  Es probable que usted también necesite medicamentos para disminuir la urgencia de orinar con frecuencia  King médico puede recomendarle jugo de arándano o suplementos de arándanos para [employer]  · [place] fabby medicamentos manjit se le haya indicado  Consulte con king médico si usted shweta que king medicamento no le está ayudando o si presenta efectos secundarios  Infórmele si es alérgico a cualquier medicamento  Mantenga juan lista actualizada de los [place], las vitaminas y los productos herbales que fatou  Incluya los siguientes datos de los medicamentos: cantidad, frecuencia y motivo de administración  Traiga con usted la lista o los envases de la píldoras a fabby citas de seguimiento   Lleve la [facility] medicamentos con usted en ari de juan emergencia  Cuidados personales:   · Orine cuando sienta el impulso de hacerlo  No retenga la orina porque pueden desarrollarse bacterias en la vejiga si la orina permanece demasiado tiempo en la vejiga  Puede ser Tom Maura orinar al menos cada 3 o 4 horas  · [address] se le haya indicado  Los líquidos pueden ayudar a eliminar las bacterias del tracto urinario  Pregunte cuánto líquido debe fernandez cada día y cuáles líquidos son los más adecuados para usted  Es probable que usted necesite fernandez más líquidos de lo habitual para ayudar a deshacerse de la bacteria  No tome alcohol, cafeína ni jugos cítricos  Estos pueden irritar pineda vejiga y aumentar fabby síntomas  · La aplicación de calor  sobre el abdomen de 20 a 30 minutos cada 2 horas por los [employer] indiquen  El calor ayuda a disminuir las molestias y la presión en pineda vejiga  Evite juan ITU:   · Las mujeres deberían limpiarse de adelante hacia atrás  después de orinar o de realizar juan evacuación intestinal  [place] podría evitar que los gérmenes entren en el tracto urinario  · Orine después de [employer] sexuales  para eliminar las bacterias que pudieran ingresar en el tracto urinario didier las relaciones sexuales  · Use ropa interior de algodón y ropa suelta  Los pantalones ajustados y la ropa interior de nylon pueden atrapar humedad y hacer que las bacterias crezcan  Acuda a fabby consultas de control con pineda médico según le indicaron  Anote fabby preguntas para que se acuerde de hacerlas didier fabby visitas  © 2017 [address] Information is for End User's use only and may not be sold, redistributed or otherwise used for commercial purposes  All illustrations and images included in CareNotes® are the copyrighted property of A D A M , Inc  or Abilio Ariza  Esta información es sólo para uso en educación   Pineda intención no es darle un consejo [facility] [employer] o tratamientos  Colsulte con king Ellen Kind farmacéutico antes de seguir cualquier régimen médico para saber si es seguro y efectivo para usted

## 2018-07-16 NOTE — ED PROVIDER NOTES
History  Chief Complaint   Patient presents with    Possible UTI     reports painful urination, fever/chills, back pain, HA, states on going sx since feb reports mulitple courses of abx for same     64-year-old female with a history of diabetes presents to the emergency department with symptoms of UTI over the last 2 days consisting of frequency and burning with urination  She had subjective fevers at home  She does complain of frontal headache  No nausea or vomiting  No chills  Patient has recurrent UTIs since February of this year  On review of her records the urine cultures out various organisms  She has been treated with different antibiotics, her last 1 being a cephalosporin  Her last UTI being treated about a week ago  She states she has resolution of her symptoms but shortly after completing the antibiotics, her symptoms returned  She does have an appointment with a pelvic specialist in 2 weeks  History provided by:  Patient and relative   used: No    Difficulty Urinating   Pain quality:  Burning  Pain severity:  Unable to specify  Onset quality:  Gradual  Duration:  2 days  Timing:  Constant  Progression:  Unchanged  Chronicity:  Recurrent  Recent urinary tract infections: yes    Relieved by: Antibiotics  Worsened by:  Nothing  Ineffective treatments:  None tried  Urinary symptoms: frequent urination    Urinary symptoms: no discolored urine, no foul-smelling urine, no hematuria, no hesitancy and no bladder incontinence    Associated symptoms: no abdominal pain, no fever, no flank pain, no genital lesions, no nausea and no vomiting    Risk factors: recurrent urinary tract infections    Risk factors: no hx of pyelonephritis, no hx of urolithiasis, no kidney transplant, no renal cysts, no renal disease, no single kidney and no urinary catheter        Prior to Admission Medications   Prescriptions Last Dose Informant Patient Reported?  Taking?   omeprazole (PriLOSEC) 20 mg delayed release capsule   No Yes   Sig: Take 1 capsule (20 mg total) by mouth daily   Patient taking differently: Take 40 mg by mouth daily        Facility-Administered Medications: None       Past Medical History:   Diagnosis Date    Class 1 obesity in adult 3/26/2018    Gastroesophageal reflux disease without esophagitis 3/26/2018    Recurrent UTI 5/3/2018       Past Surgical History:   Procedure Laterality Date    HERNIA REPAIR      TUBAL LIGATION         Family History   Problem Relation Age of Onset    Diabetes Mother      I have reviewed and agree with the history as documented  Social History   Substance Use Topics    Smoking status: Never Smoker    Smokeless tobacco: Never Used    Alcohol use No        Review of Systems   Constitutional: Negative  Negative for chills, diaphoresis, fatigue and fever  HENT: Negative  Negative for congestion, rhinorrhea and sore throat  Eyes: Negative  Negative for discharge, redness and itching  Respiratory: Negative  Negative for apnea, cough, chest tightness, shortness of breath and wheezing  Cardiovascular: Negative for chest pain, palpitations and leg swelling  Gastrointestinal: Negative  Negative for abdominal pain, nausea and vomiting  Endocrine: Negative  Genitourinary: Positive for dysuria and frequency  Negative for decreased urine volume, difficulty urinating, flank pain, hematuria and urgency  Musculoskeletal: Negative  Negative for back pain  Skin: Negative  Allergic/Immunologic: Negative  Neurological: Negative  Negative for dizziness, syncope, weakness, light-headedness, numbness and headaches  Hematological: Negative  All other systems reviewed and are negative  Physical Exam  Physical Exam   Constitutional: She is oriented to person, place, and time  She appears well-developed and well-nourished  Non-toxic appearance  She does not have a sickly appearance  She does not appear ill  No distress     HENT: Head: Normocephalic and atraumatic  Right Ear: External ear normal    Left Ear: External ear normal    Eyes: Conjunctivae are normal  Pupils are equal, round, and reactive to light  Right eye exhibits no discharge  Left eye exhibits no discharge  No scleral icterus  Cardiovascular: Normal rate, regular rhythm and normal heart sounds  Exam reveals no gallop and no friction rub  No murmur heard  Pulmonary/Chest: Effort normal and breath sounds normal  No respiratory distress  She has no wheezes  She has no rales  She exhibits no tenderness  Abdominal: Soft  Bowel sounds are normal  She exhibits no distension and no mass  There is no tenderness  No hernia  Neurological: She is alert and oriented to person, place, and time  She has normal reflexes  She exhibits normal muscle tone  Skin: Skin is warm and dry  No rash noted  She is not diaphoretic  No erythema  No pallor  Psychiatric: She has a normal mood and affect  Nursing note and vitals reviewed  Vital Signs  ED Triage Vitals [07/15/18 2204]   Temperature Pulse Respirations Blood Pressure SpO2   99 °F (37 2 °C) 82 16 155/76 99 %      Temp Source Heart Rate Source Patient Position - Orthostatic VS BP Location FiO2 (%)   Temporal -- Sitting Right arm --      Pain Score       No Pain           Vitals:    07/15/18 2204   BP: 155/76   Pulse: 82   Patient Position - Orthostatic VS: Sitting       Visual Acuity      ED Medications  Medications - No data to display    Diagnostic Studies  Results Reviewed     Procedure Component Value Units Date/Time    Urine Microscopic [[id number]]  (Abnormal) Collected:  07/15/18 2224    Lab Status:  Final result Specimen:  Urine from Urine, Clean Catch Updated:  07/15/18 2243     RBC, UA       Field obscured, unable to enumerate (A)     /hpf     WBC, UA Innumerable (A) /hpf      Epithelial Cells None Seen /hpf      Bacteria, UA None Seen /hpf     Urine culture [[id number]] Collected:  07/15/18 2224    Lab Status:   In process Specimen:  Urine from Urine, Clean Catch Updated:  07/15/18 2243    POCT urinalysis dipstick [[id number]]  (Abnormal) Resulted:  07/15/18 2220    Lab Status:  Final result Specimen:  Urine Updated:  07/15/18 2220    ED Urine Macroscopic [[id number]]  (Abnormal) Collected:  07/15/18 2224    Lab Status:  Final result Specimen:  Urine Updated:  07/15/18 2218     Color, UA Yellow     Clarity, UA Clear     pH, UA >=9 0 (H)     Leukocytes, UA Large (A)     Nitrite, UA Negative     Protein,  (2+) (A) mg/dl      Glucose, UA Negative mg/dl      Ketones, UA Negative mg/dl      Urobilinogen, UA 0 2 E U /dl      Bilirubin, UA Negative     Blood, UA Moderate (A)     Specific Roland, UA 1 020    Narrative:       CLINITEK RESULT                 No orders to display              Procedures  Procedures       Phone Contacts  ED Phone Contact    ED Course                               MDM  Number of Diagnoses or Management Options  UTI (urinary tract infection):   Diagnosis management comments: 78-year-old female presents to the emergency department with recurrent UTI symptoms over the last few days  Patient has had frequent UTIs since February being treated withDifferent antibiotics  She does have an appointment with a specialist in 2 weeks  On exam she appears very well in no acute distress  She is not febrile  Her abdomen is completely nontender  Will check urine to rule out UTI          Amount and/or Complexity of Data Reviewed  Independent visualization of images, tracings, or specimens: yes      CritCare Time    Disposition  Final diagnoses:   UTI (urinary tract infection)     Time reflects when diagnosis was documented in both MDM as applicable and the Disposition within this note     Time User Action Codes Description Comment    7/15/2018 10:42 PM Ba Huerta Add [N39 0] UTI (urinary tract infection)       ED Disposition     ED Disposition Condition Comment    Discharge  [facility] discharge to home/self care  Condition at discharge: Good        Follow-up Information     Follow up With Specialties Details Why Contact Info       Keep your appointment with the specialist           Patient's Medications   Discharge Prescriptions    CEFPODOXIME (VANTIN) 200 MG TABLET    Take 1 tablet (200 mg total) by mouth every 12 (twelve) hours for 10 days       Start Date: 7/15/2018 End Date: 7/25/2018       Order Dose: 200 mg       Quantity: 20 tablet    Refills: 0    PHENAZOPYRIDINE (PYRIDIUM) 200 MG TABLET    Take 1 tablet (200 mg total) by mouth every 8 (eight) hours as needed for bladder spasms for up to 2 days       Start Date: 7/15/2018 End Date: 7/17/2018       Order Dose: 200 mg       Quantity: 6 tablet    Refills: 0     No discharge procedures on file      ED Provider  Electronically Signed by           Georgina Allan DO  07/15/18 8083

## 2018-07-17 ENCOUNTER — HOSPITAL ENCOUNTER (OUTPATIENT)
Dept: BONE DENSITY | Facility: MEDICAL CENTER | Age: 68
Discharge: HOME | End: 2018-07-17
Payer: COMMERCIAL

## 2018-07-17 ENCOUNTER — HOSPITAL ENCOUNTER (OUTPATIENT)
Dept: MAMMOGRAPHY | Facility: MEDICAL CENTER | Age: 68
Discharge: HOME | End: 2018-07-17
Payer: COMMERCIAL

## 2018-07-17 DIAGNOSIS — Z78.0 POST-MENOPAUSAL: ICD-10-CM

## 2018-07-17 DIAGNOSIS — R29.890 LOSS OF HEIGHT: ICD-10-CM

## 2018-07-17 DIAGNOSIS — Z12.39 BREAST CANCER SCREENING: ICD-10-CM

## 2018-07-17 LAB — BACTERIA UR CULT: ABNORMAL

## 2018-07-17 PROCEDURE — 77067 SCR MAMMO BI INCL CAD: CPT

## 2018-07-17 PROCEDURE — 77063 BREAST TOMOSYNTHESIS BI: CPT

## 2018-07-17 PROCEDURE — 77080 DXA BONE DENSITY AXIAL: CPT

## 2018-07-19 ENCOUNTER — TELEPHONE (OUTPATIENT)
Dept: FAMILY MEDICINE CLINIC | Facility: CLINIC | Age: 68
End: 2018-07-19

## 2018-07-19 NOTE — TELEPHONE ENCOUNTER
----- Message from [address] [place] [address] sent at 7/19/2018  3:27 PM EDT -----  Normal mammo

## 2018-07-25 ENCOUNTER — TELEPHONE (OUTPATIENT)
Dept: FAMILY MEDICINE CLINIC | Facility: CLINIC | Age: 68
End: 2018-07-25

## 2018-07-25 DIAGNOSIS — M81.0 AGE-RELATED OSTEOPOROSIS WITHOUT CURRENT PATHOLOGICAL FRACTURE: Primary | ICD-10-CM

## 2018-07-25 NOTE — TELEPHONE ENCOUNTER
----- Message from [address] [place] [address] sent at 7/25/2018  3:18 PM EDT -----  She has pretty bad osteoporosis in her back and her hip  She needs to get additional blood work done   rx entered

## 2018-07-25 NOTE — TELEPHONE ENCOUNTER
Pt was notified, she will be going this week to have additional labs drawn   Also states that she is seeing a GYN but will call back with more info because the GYN told her she needs a Dr  to Dr Smitha Patricio

## 2018-07-26 ENCOUNTER — APPOINTMENT (OUTPATIENT)
Dept: LAB | Facility: HOSPITAL | Age: 68
End: 2018-07-26
Payer: COMMERCIAL

## 2018-07-26 DIAGNOSIS — M81.0 AGE-RELATED OSTEOPOROSIS WITHOUT CURRENT PATHOLOGICAL FRACTURE: ICD-10-CM

## 2018-07-26 LAB
25(OH)D3 SERPL-MCNC: 10.4 NG/ML (ref 30–100)
ALBUMIN SERPL BCP-MCNC: 3.9 G/DL (ref 3.5–5)
ALP SERPL-CCNC: 92 U/L (ref 46–116)
ALT SERPL W P-5'-P-CCNC: 23 U/L (ref 12–78)
ANION GAP SERPL CALCULATED.3IONS-SCNC: 7 MMOL/L (ref 4–13)
AST SERPL W P-5'-P-CCNC: 20 U/L (ref 5–45)
BILIRUB SERPL-MCNC: 0.89 MG/DL (ref 0.2–1)
BUN SERPL-MCNC: 13 MG/DL (ref 5–25)
CALCIUM SERPL-MCNC: 9.9 MG/DL (ref 8.3–10.1)
CHLORIDE SERPL-SCNC: 105 MMOL/L (ref 100–108)
CO2 SERPL-SCNC: 28 MMOL/L (ref 21–32)
CREAT SERPL-MCNC: 0.78 MG/DL (ref 0.6–1.3)
GFR SERPL CREATININE-BSD FRML MDRD: 79 ML/MIN/1.73SQ M
GLUCOSE SERPL-MCNC: 87 MG/DL (ref 65–140)
POTASSIUM SERPL-SCNC: 4.3 MMOL/L (ref 3.5–5.3)
PROT SERPL-MCNC: 8 G/DL (ref 6.4–8.2)
PTH-INTACT SERPL-MCNC: 74.9 PG/ML (ref 18.4–80.1)
SODIUM SERPL-SCNC: 140 MMOL/L (ref 136–145)

## 2018-07-26 PROCEDURE — 36415 COLL VENOUS BLD VENIPUNCTURE: CPT | Performed by: NURSE PRACTITIONER

## 2018-07-26 PROCEDURE — 82306 VITAMIN D 25 HYDROXY: CPT

## 2018-07-26 PROCEDURE — 83970 ASSAY OF PARATHORMONE: CPT

## 2018-07-26 PROCEDURE — 80053 COMPREHEN METABOLIC PANEL: CPT | Performed by: NURSE PRACTITIONER

## 2018-08-02 ENCOUNTER — TELEPHONE (OUTPATIENT)
Dept: FAMILY MEDICINE CLINIC | Facility: CLINIC | Age: 68
End: 2018-08-02

## 2018-08-02 DIAGNOSIS — E55.9 VITAMIN D DEFICIENCY: ICD-10-CM

## 2018-08-02 DIAGNOSIS — M81.0 AGE-RELATED OSTEOPOROSIS WITHOUT CURRENT PATHOLOGICAL FRACTURE: Primary | ICD-10-CM

## 2018-08-02 RX ORDER — ERGOCALCIFEROL 1.25 MG/1
50000 CAPSULE ORAL WEEKLY
Qty: 12 CAPSULE | Refills: 0 | Status: SHIPPED | OUTPATIENT
Start: 2018-08-02 | End: 2018-10-04 | Stop reason: SDUPTHER

## 2018-08-02 NOTE — TELEPHONE ENCOUNTER
----- Message from [address] [place] [address] sent at 8/2/2018  2:04 PM EDT -----  Her vitamin d is really low  I will send rx to have vitamin d she should take 1 capsule weekly for 12 weeks and then switch to 2,000 units daily, which she can buy over the counter  I am interested in getting her started on medication to treat her osteoporosis; I am still working on gathering information to see if it can be done  Once I get ok to complete I will have our office call to set up an appointment to discuss

## 2018-08-09 DIAGNOSIS — K21.9 GASTROESOPHAGEAL REFLUX DISEASE WITHOUT ESOPHAGITIS: ICD-10-CM

## 2018-08-09 RX ORDER — OMEPRAZOLE 20 MG/1
20 CAPSULE, DELAYED RELEASE ORAL DAILY
Qty: 90 CAPSULE | Refills: 1 | Status: SHIPPED | OUTPATIENT
Start: 2018-08-09 | End: 2019-08-23 | Stop reason: SDUPTHER

## 2018-08-09 NOTE — TELEPHONE ENCOUNTER
PATIENT STATES SHE IS GOING TO TAKE ONE OF HER SPOUSE PANTAPRAZOLE UNTIL YOU SEND THIS RX BECAUSE SHE HAS NO MORE AND SHE HAD REALLY BAD ACID REFLUX  I TOLD HER SHE SHOULD NOT AND WAIT UNTIL YOU SEND HER RX, AND SHE STATES SHE WILL WAIT       LAST VISIT: 06/25/2018

## 2018-09-20 ENCOUNTER — OFFICE VISIT (OUTPATIENT)
Dept: FAMILY MEDICINE CLINIC | Facility: CLINIC | Age: 68
End: 2018-09-20
Payer: COMMERCIAL

## 2018-09-20 VITALS
HEIGHT: 55 IN | RESPIRATION RATE: 18 BRPM | WEIGHT: 133.3 LBS | SYSTOLIC BLOOD PRESSURE: 112 MMHG | DIASTOLIC BLOOD PRESSURE: 70 MMHG | TEMPERATURE: 97.9 F | HEART RATE: 64 BPM | BODY MASS INDEX: 30.85 KG/M2

## 2018-09-20 DIAGNOSIS — M81.0 AGE-RELATED OSTEOPOROSIS WITHOUT CURRENT PATHOLOGICAL FRACTURE: Primary | ICD-10-CM

## 2018-09-20 DIAGNOSIS — G89.29 CHRONIC MIDLINE LOW BACK PAIN WITHOUT SCIATICA: ICD-10-CM

## 2018-09-20 DIAGNOSIS — R10.9 ABDOMINAL BLOATING WITH CRAMPS: ICD-10-CM

## 2018-09-20 DIAGNOSIS — Z23 ENCOUNTER FOR IMMUNIZATION: ICD-10-CM

## 2018-09-20 DIAGNOSIS — M54.50 CHRONIC MIDLINE LOW BACK PAIN WITHOUT SCIATICA: ICD-10-CM

## 2018-09-20 DIAGNOSIS — R14.0 ABDOMINAL BLOATING WITH CRAMPS: ICD-10-CM

## 2018-09-20 PROCEDURE — 99213 OFFICE O/P EST LOW 20 MIN: CPT | Performed by: NURSE PRACTITIONER

## 2018-09-20 PROCEDURE — G0008 ADMIN INFLUENZA VIRUS VAC: HCPCS | Performed by: NURSE PRACTITIONER

## 2018-09-20 PROCEDURE — 90662 IIV NO PRSV INCREASED AG IM: CPT | Performed by: NURSE PRACTITIONER

## 2018-09-20 PROCEDURE — 3008F BODY MASS INDEX DOCD: CPT | Performed by: NURSE PRACTITIONER

## 2018-09-20 RX ORDER — TROSPIUM CHLORIDE ER 60 MG/1
CAPSULE ORAL
COMMUNITY
Start: 2018-08-07 | End: 2019-05-03 | Stop reason: SDUPTHER

## 2018-09-20 RX ORDER — PHENOL 1.4 %
600 AEROSOL, SPRAY (ML) MUCOUS MEMBRANE DAILY
Qty: 90 TABLET | Refills: 0 | Status: SHIPPED | OUTPATIENT
Start: 2018-09-20 | End: 2019-01-08 | Stop reason: SDUPTHER

## 2018-09-20 RX ORDER — GABAPENTIN 100 MG/1
100 CAPSULE ORAL 3 TIMES DAILY
Qty: 30 CAPSULE | Refills: 0 | Status: SHIPPED | OUTPATIENT
Start: 2018-09-20 | End: 2018-10-04 | Stop reason: SDUPTHER

## 2018-09-20 NOTE — PROGRESS NOTES
Chief Complaint   Patient presents with    Back Pain     difficulty to get up and bend        Assessment/Plan:       Diagnoses and all orders for this visit:    Age-related osteoporosis without current pathological fracture  -     Ambulatory referral to Rheumatology; Future  -     calcium carbonate (OS-ROSA) 600 MG tablet; Take 1 tablet (600 mg total) by mouth daily    Chronic midline low back pain without sciatica  -     gabapentin (NEURONTIN) 100 mg capsule; Take 1 capsule (100 mg total) by mouth 3 (three) times a day    Abdominal bloating with cramps  -     Ova and parasite examination; Future    Encounter for immunization  -     influenza vaccine, [id number], high-dose, PF 0 5 mL, for patients 65 yr+ (FLUZONE HIGH-DOSE)    Other orders  -     trospium (SANCTURA XR) 60 mg 24 hr capsule;   -     b complex vitamins tablet; Take 1 tablet by mouth daily          Subjective:      Patient ID: Rainer Johnson is a 80 y o  female  Patient here to discuss her bone density exam    Patient states she has been feeling abdominal fullness and bloating, no belching  Feels like her stomach is heavy  She does have a history of worm while living in   She feels like something is moving in her anus as well  Back Pain   This is a chronic problem  The current episode started more than 1 year ago  The problem occurs daily  The problem has been waxing and waning since onset  The pain is present in the lumbar spine  The quality of the pain is described as burning and cramping  The pain does not radiate  The pain is moderate  The pain is worse during the night  The symptoms are aggravated by position and lying down  Stiffness is present at night  Associated symptoms include abdominal pain  Pertinent negatives include no chest pain, fever, headaches, leg pain or weakness  Risk factors include lack of exercise and menopause  She has tried home exercises, muscle relaxant and NSAIDs for the symptoms  The treatment provided mild relief  The following portions of the patient's history were reviewed and updated as appropriate: allergies, current medications, past family history, past medical history, past social history, past surgical history and problem list     Review of Systems   Constitutional: Negative for fever and unexpected weight change  Respiratory: Negative for chest tightness and shortness of breath  Cardiovascular: Negative for chest pain and palpitations  Gastrointestinal: Positive for abdominal distention, abdominal pain and nausea  Negative for blood in stool, constipation and diarrhea  Musculoskeletal: Positive for back pain  Skin: Negative for wound  Neurological: Negative for weakness and headaches  Psychiatric/Behavioral: Positive for sleep disturbance  Objective:      /70 (BP Location: Left arm, Patient Position: Sitting, Cuff Size: Adult)   Pulse 64   Temp 97 9 °F (36 6 °C) (Temporal)   Resp 18   Ht 4' 6 5" (1 384 m)   Wt 60 5 kg (133 lb 4 8 oz)   Breastfeeding? No   BMI 31 55 kg/m²          Physical Exam   Constitutional: She is oriented to person, place, and time  She appears well-developed and well-nourished  HENT:   Head: Normocephalic and atraumatic  Cardiovascular: Normal rate, regular rhythm and normal heart sounds  Pulmonary/Chest: Effort normal and breath sounds normal  No respiratory distress  Abdominal: Soft  Bowel sounds are increased  There is generalized tenderness (mild)  Musculoskeletal:        Lumbar back: She exhibits decreased range of motion and tenderness  Neurological: She is alert and oriented to person, place, and time  Nursing note and vitals reviewed

## 2018-09-21 ENCOUNTER — TELEPHONE (OUTPATIENT)
Dept: FAMILY MEDICINE CLINIC | Facility: CLINIC | Age: 68
End: 2018-09-21

## 2018-09-21 NOTE — TELEPHONE ENCOUNTER
Pt called inquriying info in reference where to go to have the Ova and parasite examination  Pt was advise to go to [facility]

## 2018-09-26 ENCOUNTER — TELEPHONE (OUTPATIENT)
Dept: FAMILY MEDICINE CLINIC | Facility: CLINIC | Age: 68
End: 2018-09-26

## 2018-09-26 NOTE — TELEPHONE ENCOUNTER
Pt called advising Rheumatologist dont have any opening until January 2019  Pt will like to know if you can refer to another rheumatologist  Heike pride

## 2018-10-01 ENCOUNTER — APPOINTMENT (OUTPATIENT)
Dept: LAB | Facility: HOSPITAL | Age: 68
End: 2018-10-01
Payer: COMMERCIAL

## 2018-10-01 DIAGNOSIS — R10.9 ABDOMINAL BLOATING WITH CRAMPS: ICD-10-CM

## 2018-10-01 DIAGNOSIS — R14.0 ABDOMINAL BLOATING WITH CRAMPS: ICD-10-CM

## 2018-10-01 PROCEDURE — 87177 OVA AND PARASITES SMEARS: CPT

## 2018-10-01 PROCEDURE — 87209 SMEAR COMPLEX STAIN: CPT

## 2018-10-03 LAB — O+P STL CONC: NORMAL

## 2018-10-03 NOTE — TELEPHONE ENCOUNTER
Pt notified  Pt states she is willing to go to [facility]  Pt was provided with Dr Marion Sharpe contact information, pt will call office to see if her insurance is accepted  Pt will advise

## 2018-10-03 NOTE — TELEPHONE ENCOUNTER
How far is patient willing to travel? May have other options in [place] or [place]  Alternative if they take her insurance would be KIAN cherry with [facility]  She can check coverage with that doctor  If so I can change referral for [facility] to that doctor

## 2018-10-04 ENCOUNTER — TELEPHONE (OUTPATIENT)
Dept: FAMILY MEDICINE CLINIC | Facility: CLINIC | Age: 68
End: 2018-10-04

## 2018-10-04 DIAGNOSIS — E55.9 VITAMIN D DEFICIENCY: ICD-10-CM

## 2018-10-04 DIAGNOSIS — M54.50 CHRONIC MIDLINE LOW BACK PAIN WITHOUT SCIATICA: ICD-10-CM

## 2018-10-04 DIAGNOSIS — M81.0 AGE-RELATED OSTEOPOROSIS WITHOUT CURRENT PATHOLOGICAL FRACTURE: ICD-10-CM

## 2018-10-04 DIAGNOSIS — G89.29 CHRONIC MIDLINE LOW BACK PAIN WITHOUT SCIATICA: ICD-10-CM

## 2018-10-04 NOTE — TELEPHONE ENCOUNTER
----- Message from [address] [place] [address] sent at 10/4/2018  2:12 PM EDT -----  Stool culture was negative for parasites

## 2018-10-04 NOTE — TELEPHONE ENCOUNTER
Notified pt  She wants to know instead of her taking the gabapentin 100mg tid, can she take 300mg once daily  Please advise?

## 2018-10-05 DIAGNOSIS — M81.0 AGE RELATED OSTEOPOROSIS, UNSPECIFIED PATHOLOGICAL FRACTURE PRESENCE: Primary | ICD-10-CM

## 2018-10-05 RX ORDER — GABAPENTIN 300 MG/1
300 CAPSULE ORAL DAILY
Qty: 30 CAPSULE | Refills: 3 | Status: SHIPPED | OUTPATIENT
Start: 2018-10-05 | End: 2020-01-15 | Stop reason: ALTCHOICE

## 2018-10-05 RX ORDER — ERGOCALCIFEROL 1.25 MG/1
50000 CAPSULE ORAL WEEKLY
Qty: 12 CAPSULE | Refills: 0 | Status: SHIPPED | OUTPATIENT
Start: 2018-10-05 | End: 2019-01-29 | Stop reason: SDUPTHER

## 2018-10-05 NOTE — TELEPHONE ENCOUNTER
She asked if you could send the 300mg to the pharmacy and was also requesting the 50,000 units of Vitamin D to be sent   She uses the [facility] she has on file

## 2018-11-16 ENCOUNTER — OFFICE VISIT (OUTPATIENT)
Dept: FAMILY MEDICINE CLINIC | Facility: CLINIC | Age: 68
End: 2018-11-16
Payer: COMMERCIAL

## 2018-11-16 ENCOUNTER — TELEPHONE (OUTPATIENT)
Dept: FAMILY MEDICINE CLINIC | Facility: CLINIC | Age: 68
End: 2018-11-16

## 2018-11-16 VITALS
HEIGHT: 55 IN | RESPIRATION RATE: 18 BRPM | SYSTOLIC BLOOD PRESSURE: 120 MMHG | DIASTOLIC BLOOD PRESSURE: 80 MMHG | HEART RATE: 78 BPM | BODY MASS INDEX: 31.06 KG/M2 | WEIGHT: 134.2 LBS | TEMPERATURE: 98.1 F

## 2018-11-16 DIAGNOSIS — N39.0 RECURRENT UTI: Primary | ICD-10-CM

## 2018-11-16 DIAGNOSIS — N30.01 ACUTE CYSTITIS WITH HEMATURIA: Primary | ICD-10-CM

## 2018-11-16 DIAGNOSIS — Z12.11 ENCOUNTER FOR SCREENING COLONOSCOPY: ICD-10-CM

## 2018-11-16 DIAGNOSIS — R30.0 DYSURIA: Primary | ICD-10-CM

## 2018-11-16 LAB
SL AMB  POCT GLUCOSE, UA: ABNORMAL
SL AMB LEUKOCYTE ESTERASE,UA: ABNORMAL
SL AMB POCT BILIRUBIN,UA: ABNORMAL
SL AMB POCT BLOOD,UA: ABNORMAL
SL AMB POCT CLARITY,UA: ABNORMAL
SL AMB POCT COLOR,UA: ABNORMAL
SL AMB POCT KETONES,UA: 5
SL AMB POCT NITRITE,UA: ABNORMAL
SL AMB POCT PH,UA: 6.5
SL AMB POCT SPECIFIC GRAVITY,UA: 1.01
SL AMB POCT URINE PROTEIN: ABNORMAL
SL AMB POCT UROBILINOGEN: 0.2

## 2018-11-16 PROCEDURE — 1160F RVW MEDS BY RX/DR IN RCRD: CPT | Performed by: FAMILY MEDICINE

## 2018-11-16 PROCEDURE — 4040F PNEUMOC VAC/ADMIN/RCVD: CPT | Performed by: FAMILY MEDICINE

## 2018-11-16 PROCEDURE — 87186 SC STD MICRODIL/AGAR DIL: CPT | Performed by: FAMILY MEDICINE

## 2018-11-16 PROCEDURE — 1036F TOBACCO NON-USER: CPT | Performed by: FAMILY MEDICINE

## 2018-11-16 PROCEDURE — 87086 URINE CULTURE/COLONY COUNT: CPT | Performed by: FAMILY MEDICINE

## 2018-11-16 PROCEDURE — 99213 OFFICE O/P EST LOW 20 MIN: CPT | Performed by: FAMILY MEDICINE

## 2018-11-16 PROCEDURE — 87147 CULTURE TYPE IMMUNOLOGIC: CPT | Performed by: FAMILY MEDICINE

## 2018-11-16 PROCEDURE — 87077 CULTURE AEROBIC IDENTIFY: CPT | Performed by: FAMILY MEDICINE

## 2018-11-16 PROCEDURE — 3008F BODY MASS INDEX DOCD: CPT | Performed by: FAMILY MEDICINE

## 2018-11-16 PROCEDURE — 81002 URINALYSIS NONAUTO W/O SCOPE: CPT | Performed by: FAMILY MEDICINE

## 2018-11-16 RX ORDER — CEPHALEXIN 500 MG/1
500 CAPSULE ORAL EVERY 8 HOURS SCHEDULED
Qty: 30 CAPSULE | Refills: 0 | Status: SHIPPED | OUTPATIENT
Start: 2018-11-16 | End: 2018-11-26 | Stop reason: ALTCHOICE

## 2018-11-16 NOTE — TELEPHONE ENCOUNTER
Pt called requesting a Rx for possible UTI  Pt states pressure while urinating, foul order, pain while urinating ans fever

## 2018-11-16 NOTE — PROGRESS NOTES
Assessment/Plan:    Recurrent UTI  Will treat with keflex but needs more of a workup, lab and ultrasound       Diagnoses and all orders for this visit:    Recurrent UTI  -     cephalexin (KEFLEX) 500 mg capsule; Take 1 capsule (500 mg total) by mouth every 8 (eight) hours for 10 days          Subjective:      Patient ID: Bryson Telles is a 80 y o  female  Urinary Tract Infection    This is a recurrent problem  The current episode started in the past 7 days  The problem occurs every urination  The problem has been gradually worsening  The quality of the pain is described as burning  The pain is at a severity of 3/10  The pain is mild  There has been no fever (99-99 5)  The fever has been present for 1 - 2 days  She is sexually active  There is a history of pyelonephritis  Associated symptoms include chills, hesitancy and urgency  Pertinent negatives include no flank pain, nausea or vomiting  She has tried increased fluids for the symptoms  The treatment provided no relief  The following portions of the patient's history were reviewed and updated as appropriate: allergies, current medications, past family history, past medical history, past social history, past surgical history and problem list       Review of Systems   Constitutional: Positive for chills and fever  Negative for activity change  Respiratory: Negative for shortness of breath  Cardiovascular: Negative for chest pain  Gastrointestinal: Negative for nausea and vomiting  Genitourinary: Positive for hesitancy and urgency  Negative for flank pain  Neurological: Negative for headaches  Objective:      /80 (BP Location: Left arm, Patient Position: Sitting, Cuff Size: Adult)   Pulse 78   Temp 98 1 °F (36 7 °C) (Temporal)   Resp 18   Ht 4' 6 5" (1 384 m)   Wt 60 9 kg (134 lb 3 2 oz)   BMI 31 77 kg/m²          Physical Exam   Constitutional: She appears well-developed and well-nourished  Neck: No thyromegaly present  Cardiovascular: Normal rate, regular rhythm and normal heart sounds  Pulmonary/Chest: Breath sounds normal    Abdominal: There is tenderness in the suprapubic area  Musculoskeletal: She exhibits no edema  Lymphadenopathy:     She has no cervical adenopathy  Vitals reviewed

## 2018-11-18 DIAGNOSIS — N30.01 ACUTE CYSTITIS WITH HEMATURIA: Primary | ICD-10-CM

## 2018-11-18 LAB
BACTERIA UR CULT: ABNORMAL
BACTERIA UR CULT: ABNORMAL

## 2018-11-18 RX ORDER — TETRACYCLINE HYDROCHLORIDE 500 MG/1
500 CAPSULE ORAL 2 TIMES DAILY
Qty: 20 CAPSULE | Refills: 0 | Status: SHIPPED | OUTPATIENT
Start: 2018-11-18 | End: 2018-11-28

## 2018-11-19 ENCOUNTER — TELEPHONE (OUTPATIENT)
Dept: FAMILY MEDICINE CLINIC | Facility: CLINIC | Age: 68
End: 2018-11-19

## 2018-11-19 NOTE — TELEPHONE ENCOUNTER
----- Message from Caitlin Graff MD sent at 11/18/2018 10:10 PM EST -----  Urine resistant to antibiotic I gave her and with othe rdrug allergies we are left with tetracyclinestop med I gave her and start tetracline-rx sent

## 2018-12-07 ENCOUNTER — TELEPHONE (OUTPATIENT)
Dept: FAMILY MEDICINE CLINIC | Facility: CLINIC | Age: 68
End: 2018-12-07

## 2018-12-07 NOTE — TELEPHONE ENCOUNTER
Left detailed message per consent form to call us back to reschedule today's appointment  Provider had to leave the office for a personal matter and we had to cancel today's appointment     12/7/2018

## 2019-01-08 DIAGNOSIS — M81.0 AGE-RELATED OSTEOPOROSIS WITHOUT CURRENT PATHOLOGICAL FRACTURE: ICD-10-CM

## 2019-01-08 RX ORDER — PHENOL 1.4 %
600 AEROSOL, SPRAY (ML) MUCOUS MEMBRANE DAILY
Qty: 90 TABLET | Refills: 5 | Status: SHIPPED | OUTPATIENT
Start: 2019-01-08 | End: 2020-02-20 | Stop reason: ALTCHOICE

## 2019-01-10 ENCOUNTER — TELEPHONE (OUTPATIENT)
Dept: FAMILY MEDICINE CLINIC | Facility: CLINIC | Age: 69
End: 2019-01-10

## 2019-01-10 ENCOUNTER — OFFICE VISIT (OUTPATIENT)
Dept: FAMILY MEDICINE CLINIC | Facility: CLINIC | Age: 69
End: 2019-01-10
Payer: COMMERCIAL

## 2019-01-10 VITALS
DIASTOLIC BLOOD PRESSURE: 80 MMHG | HEIGHT: 55 IN | WEIGHT: 132 LBS | TEMPERATURE: 98.2 F | RESPIRATION RATE: 18 BRPM | SYSTOLIC BLOOD PRESSURE: 118 MMHG | HEART RATE: 68 BPM | BODY MASS INDEX: 30.55 KG/M2

## 2019-01-10 DIAGNOSIS — N39.0 RECURRENT UTI: Primary | ICD-10-CM

## 2019-01-10 DIAGNOSIS — R30.0 DYSURIA: Primary | ICD-10-CM

## 2019-01-10 LAB
SL AMB  POCT GLUCOSE, UA: NEGATIVE
SL AMB LEUKOCYTE ESTERASE,UA: ABNORMAL
SL AMB POCT BILIRUBIN,UA: NEGATIVE
SL AMB POCT BLOOD,UA: ABNORMAL
SL AMB POCT CLARITY,UA: ABNORMAL
SL AMB POCT COLOR,UA: YELLOW
SL AMB POCT KETONES,UA: NEGATIVE
SL AMB POCT NITRITE,UA: NEGATIVE
SL AMB POCT PH,UA: 5
SL AMB POCT SPECIFIC GRAVITY,UA: 1.01
SL AMB POCT URINE PROTEIN: 2
SL AMB POCT UROBILINOGEN: NEGATIVE

## 2019-01-10 PROCEDURE — 87086 URINE CULTURE/COLONY COUNT: CPT | Performed by: FAMILY MEDICINE

## 2019-01-10 PROCEDURE — 3008F BODY MASS INDEX DOCD: CPT | Performed by: FAMILY MEDICINE

## 2019-01-10 PROCEDURE — 99213 OFFICE O/P EST LOW 20 MIN: CPT | Performed by: FAMILY MEDICINE

## 2019-01-10 PROCEDURE — 81002 URINALYSIS NONAUTO W/O SCOPE: CPT | Performed by: FAMILY MEDICINE

## 2019-01-10 PROCEDURE — 1160F RVW MEDS BY RX/DR IN RCRD: CPT | Performed by: FAMILY MEDICINE

## 2019-01-10 PROCEDURE — 87147 CULTURE TYPE IMMUNOLOGIC: CPT | Performed by: FAMILY MEDICINE

## 2019-01-10 RX ORDER — DOXYCYCLINE HYCLATE 100 MG
100 TABLET ORAL 2 TIMES DAILY
Qty: 7 TABLET | Refills: 0 | Status: SHIPPED | OUTPATIENT
Start: 2019-01-10 | End: 2019-01-24

## 2019-01-10 RX ORDER — OMEGA-3-ACID ETHYL ESTERS 1 G/1
2 CAPSULE, LIQUID FILLED ORAL 2 TIMES DAILY
COMMUNITY
End: 2022-06-23 | Stop reason: ALTCHOICE

## 2019-01-10 NOTE — PATIENT INSTRUCTIONS
Reviewed urine cultures-doxycycline looks like best med-take with a meal and lots of water, no dairy 1 hour before or after

## 2019-01-10 NOTE — PROGRESS NOTES
Assessment/Plan:    Recurrent UTI  Await urology eval       Diagnoses and all orders for this visit:    Recurrent UTI  -     Ambulatory referral to Urology; Future  -     doxycycline hyclate (VIBRA-TABS) 100 mg tablet; Take 1 tablet (100 mg total) by mouth 2 (two) times a day for 14 days    Other orders  -     omega-3-acid ethyl esters (LOVAZA) 1 g capsule; Take 2 g by mouth 2 (two) times a day          Subjective:      Patient ID: Sherren Corrigan is a 80 y o  female  Urinary Frequency    This is a new problem  The current episode started in the past 7 days  The problem occurs every urination  The problem has been gradually worsening  The quality of the pain is described as burning  The pain is at a severity of 4/10  The pain is mild  There has been no fever  She is sexually active  There is a history of pyelonephritis  Associated symptoms include frequency  Pertinent negatives include no chills, hematuria, nausea or vomiting  She has tried increased fluids and acetaminophen for the symptoms  The treatment provided mild relief  Her past medical history is significant for recurrent UTIs  The following portions of the patient's history were reviewed and updated as appropriate: allergies, current medications, past family history, past medical history, past social history, past surgical history and problem list   Review of Systems   Constitutional: Negative for chills  Respiratory: Negative for shortness of breath  Cardiovascular: Negative for chest pain  Gastrointestinal: Negative for nausea and vomiting  Genitourinary: Positive for dysuria and frequency  Negative for hematuria  Objective:      /80 (BP Location: Right arm, Patient Position: Sitting, Cuff Size: Standard)   Pulse 68   Temp 98 2 °F (36 8 °C) (Temporal)   Resp 18   Ht 4' 6 5" (1 384 m)   Wt 59 9 kg (132 lb)   BMI 31 25 kg/m²          Physical Exam   Constitutional: She appears well-developed and well-nourished  Cardiovascular: Normal rate, regular rhythm and normal heart sounds  Pulmonary/Chest: Breath sounds normal    Abdominal: She exhibits no distension  There is no tenderness  Vitals reviewed

## 2019-01-10 NOTE — TELEPHONE ENCOUNTER
[facility] pharmacy called for clarification on pt qty for doxycycline hyclate 100 mg   Quantity was adjusted to 28 not 14

## 2019-01-11 ENCOUNTER — TELEPHONE (OUTPATIENT)
Dept: FAMILY MEDICINE CLINIC | Facility: CLINIC | Age: 69
End: 2019-01-11

## 2019-01-11 LAB — BACTERIA UR CULT: ABNORMAL

## 2019-01-11 NOTE — TELEPHONE ENCOUNTER
Spoke to pt and notified of the U/A results as per Dr Rianna Nuñez, Urine abnl, cont meds   RDBJr RMA

## 2019-01-24 DIAGNOSIS — Z01.419 ENCOUNTER FOR GYNECOLOGICAL EXAMINATION WITHOUT ABNORMAL FINDING: Primary | ICD-10-CM

## 2019-01-29 DIAGNOSIS — E55.9 VITAMIN D DEFICIENCY: ICD-10-CM

## 2019-01-29 DIAGNOSIS — M81.0 AGE-RELATED OSTEOPOROSIS WITHOUT CURRENT PATHOLOGICAL FRACTURE: ICD-10-CM

## 2019-01-29 RX ORDER — ERGOCALCIFEROL 1.25 MG/1
50000 CAPSULE ORAL WEEKLY
Qty: 12 CAPSULE | Refills: 0 | Status: SHIPPED | OUTPATIENT
Start: 2019-01-29 | End: 2020-01-15 | Stop reason: ALTCHOICE

## 2019-02-28 ENCOUNTER — APPOINTMENT (OUTPATIENT)
Dept: LAB | Facility: HOSPITAL | Age: 69
End: 2019-02-28
Attending: INTERNAL MEDICINE
Payer: COMMERCIAL

## 2019-02-28 ENCOUNTER — TRANSCRIBE ORDERS (OUTPATIENT)
Dept: ADMINISTRATIVE | Facility: HOSPITAL | Age: 69
End: 2019-02-28

## 2019-02-28 DIAGNOSIS — M81.0 SENILE OSTEOPOROSIS: Primary | ICD-10-CM

## 2019-02-28 DIAGNOSIS — R53.83 OTHER FATIGUE: ICD-10-CM

## 2019-02-28 DIAGNOSIS — E55.9 VITAMIN D DEFICIENCY: ICD-10-CM

## 2019-02-28 DIAGNOSIS — M81.0 SENILE OSTEOPOROSIS: ICD-10-CM

## 2019-02-28 LAB
25(OH)D3 SERPL-MCNC: 50 NG/ML (ref 30–100)
ALBUMIN SERPL BCP-MCNC: 4 G/DL (ref 3.5–5)
ALP SERPL-CCNC: 97 U/L (ref 46–116)
ALT SERPL W P-5'-P-CCNC: 24 U/L (ref 12–78)
ANION GAP SERPL CALCULATED.3IONS-SCNC: 10 MMOL/L (ref 4–13)
AST SERPL W P-5'-P-CCNC: 24 U/L (ref 5–45)
BILIRUB SERPL-MCNC: 0.79 MG/DL (ref 0.2–1)
BUN SERPL-MCNC: 10 MG/DL (ref 5–25)
CALCIUM SERPL-MCNC: 9.8 MG/DL (ref 8.3–10.1)
CHLORIDE SERPL-SCNC: 103 MMOL/L (ref 100–108)
CO2 SERPL-SCNC: 26 MMOL/L (ref 21–32)
CREAT SERPL-MCNC: 0.84 MG/DL (ref 0.6–1.3)
GFR SERPL CREATININE-BSD FRML MDRD: 72 ML/MIN/1.73SQ M
GLUCOSE P FAST SERPL-MCNC: 89 MG/DL (ref 65–99)
POTASSIUM SERPL-SCNC: 4.1 MMOL/L (ref 3.5–5.3)
PROT SERPL-MCNC: 8.2 G/DL (ref 6.4–8.2)
SODIUM SERPL-SCNC: 139 MMOL/L (ref 136–145)
TSH SERPL DL<=0.05 MIU/L-ACNC: 1.95 UIU/ML (ref 0.36–3.74)

## 2019-02-28 PROCEDURE — 80053 COMPREHEN METABOLIC PANEL: CPT

## 2019-02-28 PROCEDURE — 82306 VITAMIN D 25 HYDROXY: CPT

## 2019-02-28 PROCEDURE — 36415 COLL VENOUS BLD VENIPUNCTURE: CPT

## 2019-02-28 PROCEDURE — 84443 ASSAY THYROID STIM HORMONE: CPT

## 2019-03-06 RX ORDER — ZOLEDRONIC ACID 5 MG/100ML
5 INJECTION, SOLUTION INTRAVENOUS ONCE
Status: COMPLETED | OUTPATIENT
Start: 2019-03-07 | End: 2019-03-07

## 2019-03-06 RX ORDER — SODIUM CHLORIDE 9 MG/ML
20 INJECTION, SOLUTION INTRAVENOUS CONTINUOUS
Status: DISCONTINUED | OUTPATIENT
Start: 2019-03-07 | End: 2019-03-10 | Stop reason: HOSPADM

## 2019-03-07 ENCOUNTER — HOSPITAL ENCOUNTER (OUTPATIENT)
Dept: INFUSION CENTER | Facility: CLINIC | Age: 69
Discharge: HOME | End: 2019-03-07
Payer: COMMERCIAL

## 2019-03-07 VITALS
DIASTOLIC BLOOD PRESSURE: 72 MMHG | WEIGHT: 138.01 LBS | HEIGHT: 55 IN | RESPIRATION RATE: 18 BRPM | SYSTOLIC BLOOD PRESSURE: 122 MMHG | HEART RATE: 54 BPM | TEMPERATURE: 97.2 F | BODY MASS INDEX: 31.94 KG/M2

## 2019-03-07 PROCEDURE — 96365 THER/PROPH/DIAG IV INF INIT: CPT

## 2019-03-07 RX ORDER — COLLAGEN, HYDROLYSATE (BOVINE) 100 %
POWDER (GRAM) MISCELLANEOUS
COMMUNITY

## 2019-03-07 RX ADMIN — ZOLEDRONIC ACID 5 MG: 5 INJECTION, SOLUTION INTRAVENOUS at 11:14

## 2019-03-07 RX ADMIN — SODIUM CHLORIDE 20 ML/HR: 0.9 INJECTION, SOLUTION INTRAVENOUS at 11:14

## 2019-03-07 NOTE — PROGRESS NOTES
Patient denies dental concerns and has a crcl of 64 2  Patient tolerated her reclast infusion and remained at the infusion center for 30 minutes of observation per order  Patient left ambulatory without complications

## 2019-05-03 ENCOUNTER — OFFICE VISIT (OUTPATIENT)
Dept: FAMILY MEDICINE CLINIC | Facility: CLINIC | Age: 69
End: 2019-05-03
Payer: COMMERCIAL

## 2019-05-03 VITALS
DIASTOLIC BLOOD PRESSURE: 78 MMHG | RESPIRATION RATE: 16 BRPM | WEIGHT: 135.6 LBS | TEMPERATURE: 97 F | HEIGHT: 55 IN | BODY MASS INDEX: 31.38 KG/M2 | SYSTOLIC BLOOD PRESSURE: 118 MMHG | HEART RATE: 62 BPM

## 2019-05-03 DIAGNOSIS — N39.0 RECURRENT UTI: Primary | ICD-10-CM

## 2019-05-03 DIAGNOSIS — K21.9 GASTROESOPHAGEAL REFLUX DISEASE WITHOUT ESOPHAGITIS: Primary | ICD-10-CM

## 2019-05-03 DIAGNOSIS — E55.9 VITAMIN D DEFICIENCY: ICD-10-CM

## 2019-05-03 LAB
BACTERIA UR QL AUTO: ABNORMAL /HPF
BILIRUB UR QL STRIP: NEGATIVE
CLARITY UR: ABNORMAL
COLOR UR: YELLOW
GLUCOSE UR STRIP-MCNC: NEGATIVE MG/DL
HGB UR QL STRIP.AUTO: ABNORMAL
KETONES UR STRIP-MCNC: NEGATIVE MG/DL
LEUKOCYTE ESTERASE UR QL STRIP: ABNORMAL
NITRITE UR QL STRIP: NEGATIVE
NON-SQ EPI CELLS URNS QL MICRO: ABNORMAL /HPF
PH UR STRIP.AUTO: 6 [PH]
PROT UR STRIP-MCNC: ABNORMAL MG/DL
RBC #/AREA URNS AUTO: ABNORMAL /HPF
SP GR UR STRIP.AUTO: 1.01 (ref 1–1.03)
UROBILINOGEN UR QL STRIP.AUTO: 0.2 E.U./DL
WBC #/AREA URNS AUTO: ABNORMAL /HPF

## 2019-05-03 PROCEDURE — 81001 URINALYSIS AUTO W/SCOPE: CPT | Performed by: FAMILY MEDICINE

## 2019-05-03 PROCEDURE — 99213 OFFICE O/P EST LOW 20 MIN: CPT | Performed by: FAMILY MEDICINE

## 2019-05-03 PROCEDURE — 87147 CULTURE TYPE IMMUNOLOGIC: CPT | Performed by: FAMILY MEDICINE

## 2019-05-03 PROCEDURE — 87086 URINE CULTURE/COLONY COUNT: CPT | Performed by: FAMILY MEDICINE

## 2019-05-03 RX ORDER — DOXYCYCLINE HYCLATE 100 MG
100 TABLET ORAL 2 TIMES DAILY
Qty: 20 TABLET | Refills: 0 | Status: SHIPPED | OUTPATIENT
Start: 2019-05-03 | End: 2019-05-13

## 2019-05-03 RX ORDER — TROSPIUM CHLORIDE 20 MG/1
20 TABLET, FILM COATED ORAL 2 TIMES DAILY
Qty: 60 TABLET | Refills: 5 | Status: SHIPPED | OUTPATIENT
Start: 2019-05-03 | End: 2020-01-15 | Stop reason: ALTCHOICE

## 2019-05-04 LAB — BACTERIA UR CULT: ABNORMAL

## 2019-06-24 ENCOUNTER — OFFICE VISIT (OUTPATIENT)
Dept: FAMILY MEDICINE CLINIC | Facility: CLINIC | Age: 69
End: 2019-06-24
Payer: COMMERCIAL

## 2019-06-24 VITALS
SYSTOLIC BLOOD PRESSURE: 116 MMHG | HEIGHT: 55 IN | OXYGEN SATURATION: 97 % | HEART RATE: 86 BPM | RESPIRATION RATE: 16 BRPM | DIASTOLIC BLOOD PRESSURE: 82 MMHG | WEIGHT: 136 LBS | TEMPERATURE: 97.9 F | BODY MASS INDEX: 31.47 KG/M2

## 2019-06-24 DIAGNOSIS — N39.0 RECURRENT UTI: Primary | ICD-10-CM

## 2019-06-24 LAB
SL AMB  POCT GLUCOSE, UA: NEGATIVE
SL AMB LEUKOCYTE ESTERASE,UA: ABNORMAL
SL AMB POCT BILIRUBIN,UA: NEGATIVE
SL AMB POCT BLOOD,UA: ABNORMAL
SL AMB POCT CLARITY,UA: ABNORMAL
SL AMB POCT COLOR,UA: YELLOW
SL AMB POCT KETONES,UA: NEGATIVE
SL AMB POCT NITRITE,UA: NEGATIVE
SL AMB POCT PH,UA: 5
SL AMB POCT SPECIFIC GRAVITY,UA: 1.02
SL AMB POCT URINE PROTEIN: ABNORMAL
SL AMB POCT UROBILINOGEN: NEGATIVE

## 2019-06-24 PROCEDURE — 1160F RVW MEDS BY RX/DR IN RCRD: CPT | Performed by: FAMILY MEDICINE

## 2019-06-24 PROCEDURE — 87147 CULTURE TYPE IMMUNOLOGIC: CPT | Performed by: FAMILY MEDICINE

## 2019-06-24 PROCEDURE — 1036F TOBACCO NON-USER: CPT | Performed by: FAMILY MEDICINE

## 2019-06-24 PROCEDURE — 99213 OFFICE O/P EST LOW 20 MIN: CPT | Performed by: FAMILY MEDICINE

## 2019-06-24 PROCEDURE — 87086 URINE CULTURE/COLONY COUNT: CPT | Performed by: FAMILY MEDICINE

## 2019-06-24 PROCEDURE — 3008F BODY MASS INDEX DOCD: CPT | Performed by: FAMILY MEDICINE

## 2019-06-24 PROCEDURE — 81002 URINALYSIS NONAUTO W/O SCOPE: CPT | Performed by: FAMILY MEDICINE

## 2019-06-24 RX ORDER — CEPHALEXIN 500 MG/1
500 CAPSULE ORAL EVERY 8 HOURS SCHEDULED
Qty: 30 CAPSULE | Refills: 0 | Status: SHIPPED | OUTPATIENT
Start: 2019-06-24 | End: 2019-07-04 | Stop reason: ALTCHOICE

## 2019-06-25 LAB — BACTERIA UR CULT: ABNORMAL

## 2019-06-26 ENCOUNTER — TELEPHONE (OUTPATIENT)
Dept: FAMILY MEDICINE CLINIC | Facility: CLINIC | Age: 69
End: 2019-06-26

## 2019-08-01 ENCOUNTER — OFFICE VISIT (OUTPATIENT)
Dept: UROLOGY | Facility: CLINIC | Age: 69
End: 2019-08-01
Payer: COMMERCIAL

## 2019-08-01 VITALS
HEIGHT: 55 IN | HEART RATE: 53 BPM | BODY MASS INDEX: 31.89 KG/M2 | WEIGHT: 137.8 LBS | DIASTOLIC BLOOD PRESSURE: 80 MMHG | SYSTOLIC BLOOD PRESSURE: 122 MMHG

## 2019-08-01 DIAGNOSIS — N39.0 URINARY TRACT INFECTION WITHOUT HEMATURIA, SITE UNSPECIFIED: Primary | ICD-10-CM

## 2019-08-01 LAB
POST-VOID RESIDUAL VOLUME, ML POC: 162 ML
SL AMB  POCT GLUCOSE, UA: NORMAL
SL AMB LEUKOCYTE ESTERASE,UA: NORMAL
SL AMB POCT BILIRUBIN,UA: NORMAL
SL AMB POCT BLOOD,UA: NORMAL
SL AMB POCT CLARITY,UA: CLEAR
SL AMB POCT COLOR,UA: YELLOW
SL AMB POCT KETONES,UA: NORMAL
SL AMB POCT NITRITE,UA: NORMAL
SL AMB POCT PH,UA: 5
SL AMB POCT SPECIFIC GRAVITY,UA: 1.01
SL AMB POCT URINE PROTEIN: NORMAL
SL AMB POCT UROBILINOGEN: 0.2

## 2019-08-01 PROCEDURE — 99204 OFFICE O/P NEW MOD 45 MIN: CPT | Performed by: UROLOGY

## 2019-08-01 PROCEDURE — 81002 URINALYSIS NONAUTO W/O SCOPE: CPT | Performed by: UROLOGY

## 2019-08-01 PROCEDURE — 51798 US URINE CAPACITY MEASURE: CPT | Performed by: UROLOGY

## 2019-08-01 NOTE — PROGRESS NOTES
8/1/2019    Laurel Childs  1950  [id number]        Assessment  Recurrent UTI/lower urinary tract symptoms    Plan  We discussed her findings and symptoms  Fortunately, she has improved taking her supplements  I would recommend evaluation of the urinary tract with ultrasound including repeat postvoid residual assessment  Would also proceed with cystoscopy to evaluate the lower tract  We discussed importance of double voiding  I discussed with them that if she continues to carry elevated postvoid residual, may need to consider learning self catheterization  She is willing to do this as a last resort  All questions answered and they agree with the plan  History of Present Illness  Linda Zarate is a 80 y o  female complaining of recurrent UTI  She reports intermittent treatments with antibiotic which helps, but then her symptoms returned immediately  She has history of Morganella infection in November 2018  However most recent urine cultures have been showing only group B strep  She reports that she started taking 2 supplements for urinary symptoms, D-Mannose and lauricidin  Her symptoms are completely resolved  Her urine is no longer cloudy and she does not have any further pain  She denies any history of nephrolithiasis  She denies constipation  She reports an evaluation by a urogynecologist about a year ago and she was told that she was normal with overactive bladder  Anticholinergic medication was recommended however she did not take it  Bladder scan postvoid residual 162 mL  Review of Systems  Review of Systems   Constitutional: Negative  HENT: Negative  Respiratory: Negative  Cardiovascular: Negative  Gastrointestinal: Negative  Genitourinary:        As per HPI   Musculoskeletal: Negative  Skin: Negative  Neurological: Negative  Hematological: Negative            Past Medical History  Past Medical History:   Diagnosis Date    Age-related osteoporosis without current pathological fracture 7/25/2018    Class 1 obesity in adult 3/26/2018    Gastroesophageal reflux disease without esophagitis 3/26/2018    Hernia, hiatal     Insomnia     Osteoporosis     Recurrent UTI 5/3/2018    Stroke (HCC)     Ulcer, gastric, acute     Vitamin D deficiency        Past Social History  Past Surgical History:   Procedure Laterality Date    BREAST LUMPECTOMY Left     HERNIA REPAIR      umbilical hernia     TUBAL LIGATION         Past Family History  Family History   Problem Relation Age of Onset    Diabetes Mother        Past Social history  Social History     Socioeconomic History    Marital status: /Civil Union     Spouse name: Not on file    Number of children: Not on file    Years of education: Not on file    Highest education level: Not on file   Occupational History    Not on file   Social Needs    Financial resource strain: Not on file    Food insecurity:     Worry: Not on file     Inability: Not on file    Transportation needs:     Medical: Not on file     Non-medical: Not on file   Tobacco Use    Smoking status: Never Smoker    Smokeless tobacco: Never Used   Substance and Sexual Activity    Alcohol use: No    Drug use: No    Sexual activity: Never   Lifestyle    Physical activity:     Days per week: Not on file     Minutes per session: Not on file    Stress: Not on file   Relationships    Social connections:     Talks on phone: Not on file     Gets together: Not on file     Attends Hoahaoism service: Not on file     Active member of club or organization: Not on file     Attends meetings of clubs or organizations: Not on file     Relationship status: Not on file    Intimate partner violence:     Fear of current or ex partner: Not on file     Emotionally abused: Not on file     Physically abused: Not on file     Forced sexual activity: Not on file   Other Topics Concern    Not on file   Social History Narrative    Not on file     Social History     Tobacco Use   Smoking Status Never Smoker   Smokeless Tobacco Never Used       Current Medications  Current Outpatient Medications   Medication Sig Dispense Refill    b complex vitamins tablet Take 1 tablet by mouth daily      calcium carbonate (OS-ROSA) 600 MG tablet Take 1 tablet (600 mg total) by mouth daily 90 tablet 5    Collagen Hydrolysate POWD by Does not apply route      D-Mannose POWD Take by mouth      omeprazole (PriLOSEC) 20 mg delayed release capsule Take 1 capsule (20 mg total) by mouth daily 90 capsule 1    vitamin E 100 UNIT capsule Take 100 Units by mouth daily      ergocalciferol (VITAMIN D2) 50,000 units Take 1 capsule (50,000 Units total) by mouth once a week (Patient not taking: Reported on 8/1/2019) 12 capsule 0    gabapentin (NEURONTIN) 300 mg capsule Take 1 capsule (300 mg total) by mouth daily (Patient not taking: Reported on 8/1/2019) 30 capsule 3    omega-3-acid ethyl esters (LOVAZA) 1 g capsule Take 2 g by mouth 2 (two) times a day      trospium chloride (SANCTURA) 20 mg tablet Take 1 tablet (20 mg total) by mouth 2 (two) times a day (Patient not taking: Reported on 8/1/2019) 60 tablet 5     No current facility-administered medications for this visit  Allergies  Allergies   Allergen Reactions    Ciprofloxacin Shortness Of Breath    Sulfa Antibiotics Shortness Of Breath     Stress and rash   Nitrofurantoin Nausea Only    Other Nausea Only     macrobid       Past Medical History, Social History, Family History, medications and allergies were reviewed  Vitals  Vitals:    08/01/19 1052   BP: 122/80   Pulse: (!) 53   Weight: 62 5 kg (137 lb 12 8 oz)   Height: 4' 7" (1 397 m)       Physical Exam  Physical Exam   Constitutional: She is oriented to person, place, and time  She appears well-developed and well-nourished  Cardiovascular: Normal rate  Pulmonary/Chest: Effort normal    Abdominal: Soft     Genitourinary:   Genitourinary Comments: No CVA tenderness  Musculoskeletal: Normal range of motion  Neurological: She is alert and oriented to person, place, and time  Skin: Skin is warm, dry and intact  Psychiatric: She has a normal mood and affect  Vitals reviewed          Results  No results found for: PSA  Lab Results   Component Value Date    CALCIUM 9 8 02/28/2019    K 4 1 02/28/2019    CO2 26 02/28/2019     02/28/2019    BUN 10 02/28/2019    CREATININE 0 84 02/28/2019     Lab Results   Component Value Date    WBC 5 03 02/27/2018    HGB 13 4 02/27/2018    HCT 39 0 02/27/2018    MCV 82 02/27/2018     02/27/2018

## 2019-08-15 ENCOUNTER — HOSPITAL ENCOUNTER (OUTPATIENT)
Dept: ULTRASOUND IMAGING | Facility: HOSPITAL | Age: 69
Discharge: HOME | End: 2019-08-15
Attending: UROLOGY
Payer: COMMERCIAL

## 2019-08-15 DIAGNOSIS — N39.0 URINARY TRACT INFECTION WITHOUT HEMATURIA, SITE UNSPECIFIED: ICD-10-CM

## 2019-08-15 PROCEDURE — 51798 US URINE CAPACITY MEASURE: CPT

## 2019-08-17 ENCOUNTER — HOSPITAL ENCOUNTER (EMERGENCY)
Facility: HOSPITAL | Age: 69
Discharge: HOME | End: 2019-08-17
Attending: EMERGENCY MEDICINE | Admitting: EMERGENCY MEDICINE
Payer: COMMERCIAL

## 2019-08-17 VITALS
HEART RATE: 82 BPM | OXYGEN SATURATION: 100 % | BODY MASS INDEX: 32.23 KG/M2 | DIASTOLIC BLOOD PRESSURE: 94 MMHG | TEMPERATURE: 99.2 F | RESPIRATION RATE: 16 BRPM | SYSTOLIC BLOOD PRESSURE: 145 MMHG | WEIGHT: 138.67 LBS

## 2019-08-17 DIAGNOSIS — N39.0 UTI (URINARY TRACT INFECTION): Primary | ICD-10-CM

## 2019-08-17 LAB
BACTERIA UR QL AUTO: ABNORMAL /HPF
BILIRUB UR QL STRIP: NEGATIVE
CLARITY UR: ABNORMAL
COLOR UR: YELLOW
GLUCOSE UR STRIP-MCNC: NEGATIVE MG/DL
HGB UR QL STRIP.AUTO: ABNORMAL
KETONES UR STRIP-MCNC: NEGATIVE MG/DL
LEUKOCYTE ESTERASE UR QL STRIP: ABNORMAL
NITRITE UR QL STRIP: NEGATIVE
NON-SQ EPI CELLS URNS QL MICRO: ABNORMAL /HPF
PH UR STRIP.AUTO: 6 [PH] (ref 4.5–8)
PROT UR STRIP-MCNC: ABNORMAL MG/DL
RBC #/AREA URNS AUTO: ABNORMAL /HPF
SP GR UR STRIP.AUTO: 1.01 (ref 1–1.03)
UROBILINOGEN UR QL STRIP.AUTO: 0.2 E.U./DL
WBC #/AREA URNS AUTO: ABNORMAL /HPF

## 2019-08-17 PROCEDURE — 87147 CULTURE TYPE IMMUNOLOGIC: CPT

## 2019-08-17 PROCEDURE — 81001 URINALYSIS AUTO W/SCOPE: CPT

## 2019-08-17 PROCEDURE — 87086 URINE CULTURE/COLONY COUNT: CPT

## 2019-08-17 PROCEDURE — 99283 EMERGENCY DEPT VISIT LOW MDM: CPT

## 2019-08-17 PROCEDURE — 99283 EMERGENCY DEPT VISIT LOW MDM: CPT | Performed by: EMERGENCY MEDICINE

## 2019-08-17 RX ORDER — CEPHALEXIN 500 MG/1
500 CAPSULE ORAL EVERY 12 HOURS SCHEDULED
Qty: 14 CAPSULE | Refills: 0 | Status: SHIPPED | OUTPATIENT
Start: 2019-08-17 | End: 2019-08-24 | Stop reason: ALTCHOICE

## 2019-08-17 NOTE — ED PROVIDER NOTES
History  Chief Complaint   Patient presents with    Painful Urination     pain with urination, also states some back pain and chills  60-year-old female presents for the evaluation of recurrent episodes of dysuria  The patient states that she has a history of recurrent urinary tract infections and has had follow up with her primary care provider as well as Urology without clear etiology although the patient does state that she has a history of incomplete bladder emptying  The patient denies any fevers or chills  She states that she has mild very low right back pain which she believes is secondary to arthritis  She states that her symptoms are similar to previous episodes urinary tract infections  She states that she was last on Keflex several weeks ago with resolution of her symptoms  She does note that she has multiple allergies to multiple different antibiotics  She has associated frequency and urgency  Prior to Admission Medications   Prescriptions Last Dose Informant Patient Reported? Taking?    Collagen Hydrolysate POWD  Self Yes No   Sig: by Does not apply route   D-Mannose POWD   Yes No   Sig: Take by mouth   b complex vitamins tablet  Self Yes No   Sig: Take 1 tablet by mouth daily   calcium carbonate (OS-ROSA) 600 MG tablet  Self No No   Sig: Take 1 tablet (600 mg total) by mouth daily   ergocalciferol (VITAMIN D2) 50,000 units  Self No No   Sig: Take 1 capsule (50,000 Units total) by mouth once a week   Patient not taking: Reported on 8/1/2019   gabapentin (NEURONTIN) 300 mg capsule  Self No No   Sig: Take 1 capsule (300 mg total) by mouth daily   Patient not taking: Reported on 8/1/2019   omega-3-acid ethyl esters (LOVAZA) 1 g capsule  Self Yes No   Sig: Take 2 g by mouth 2 (two) times a day   omeprazole (PriLOSEC) 20 mg delayed release capsule  Self No No   Sig: Take 1 capsule (20 mg total) by mouth daily   trospium chloride (SANCTURA) 20 mg tablet   No No   Sig: Take 1 tablet (20 mg total) by mouth 2 (two) times a day   Patient not taking: Reported on 8/1/2019   vitamin E 100 UNIT capsule  Self Yes No   Sig: Take 100 Units by mouth daily      Facility-Administered Medications: None       Past Medical History:   Diagnosis Date    Age-related osteoporosis without current pathological fracture 7/25/2018    Class 1 obesity in adult 3/26/2018    Gastroesophageal reflux disease without esophagitis 3/26/2018    Hernia, hiatal     Insomnia     Osteoporosis     Recurrent UTI 5/3/2018    Stroke ([facility] [address] )     Ulcer, gastric, acute     Vitamin D deficiency        Past Surgical History:   Procedure Laterality Date    BREAST LUMPECTOMY Left     HERNIA REPAIR      umbilical hernia     TUBAL LIGATION         Family History   Problem Relation Age of Onset    Diabetes Mother      I have reviewed and agree with the history as documented  Social History     Tobacco Use    Smoking status: Never Smoker    Smokeless tobacco: Never Used   Substance Use Topics    Alcohol use: No    Drug use: No        Review of Systems   Constitutional: Negative for chills, fatigue and fever  HENT: Negative for sore throat  Eyes: Negative for visual disturbance  Respiratory: Negative for shortness of breath  Cardiovascular: Negative for chest pain  Gastrointestinal: Negative for abdominal pain, diarrhea, nausea and vomiting  Genitourinary: Positive for dysuria, frequency and urgency  Negative for difficulty urinating and pelvic pain  Musculoskeletal: Positive for back pain  Skin: Negative for rash  Neurological: Negative for syncope, weakness and headaches  All other systems reviewed and are negative  Physical Exam  Physical Exam   Constitutional: She is oriented to person, place, and time  She appears well-developed and well-nourished  No distress  HENT:   Head: Normocephalic and atraumatic     Right Ear: External ear normal    Left Ear: External ear normal    Eyes: Pupils are equal, round, and reactive to light  Conjunctivae and EOM are normal  No scleral icterus  Neck: Normal range of motion  Cardiovascular: Normal rate, regular rhythm and normal heart sounds  Pulmonary/Chest: Effort normal and breath sounds normal  No respiratory distress  Abdominal: Soft  Bowel sounds are normal  There is no tenderness  There is no rebound, no guarding and no CVA tenderness  Musculoskeletal: Normal range of motion  She exhibits no edema  Neurological: She is alert and oriented to person, place, and time  Skin: Skin is warm and dry  No rash noted  Psychiatric: She has a normal mood and affect  Nursing note and vitals reviewed  Vital Signs  ED Triage Vitals [08/17/19 1732]   Temperature Pulse Respirations Blood Pressure SpO2   99 2 °F (37 3 °C) 82 16 145/94 100 %      Temp Source Heart Rate Source Patient Position - Orthostatic VS BP Location FiO2 (%)   Temporal -- Sitting Right arm --      Pain Score       3           Vitals:    08/17/19 1732   BP: 145/94   Pulse: 82   Patient Position - Orthostatic VS: Sitting         Visual Acuity      ED Medications  Medications - No data to display    Diagnostic Studies  Results Reviewed     Procedure Component Value Units Date/Time    Urine Microscopic [[id number]]  (Abnormal) Collected:  08/17/19 1808    Lab Status:  Final result Specimen:  Urine, Clean Catch Updated:  08/17/19 1824     RBC, UA Innumerable /hpf      WBC, UA Innumerable /hpf      Epithelial Cells Occasional /hpf      Bacteria, UA Occasional /hpf     Urine culture [[id number]] Collected:  08/17/19 1808    Lab Status:   In process Specimen:  Urine, Clean Catch Updated:  08/17/19 1824    POCT urinalysis dipstick [[id number]]  (Abnormal) Resulted:  08/17/19 1756    Lab Status:  Final result Specimen:  Urine Updated:  08/17/19 1756    ED Urine Macroscopic [[id number]]  (Abnormal) Collected:  08/17/19 1808    Lab Status:  Final result Specimen:  Urine Updated:  08/17/19 1754     Color, UA Yellow Clarity, UA Cloudy     pH, UA 6 0     Leukocytes, UA Moderate     Nitrite, UA Negative     Protein,  (2+) mg/dl      Glucose, UA Negative mg/dl      Ketones, UA Negative mg/dl      Urobilinogen, UA 0 2 E U /dl      Bilirubin, UA Negative     Blood, UA Large     Specific Roberts, UA 1 015    Narrative:       CLINITEK RESULT                 No orders to display              Procedures  Procedures       ED Course                               MDM  Number of Diagnoses or Management Options  UTI (urinary tract infection): new and requires workup  Diagnosis management comments: Plan is to check urinalysis for recurrent urinary tract infection  UA reviewed and the plan is for Keflex and discharged with Urology follow-up       Amount and/or Complexity of Data Reviewed  Clinical lab tests: ordered and reviewed  Review and summarize past medical records: yes        Disposition  Final diagnoses:   UTI (urinary tract infection)     Time reflects when diagnosis was documented in both MDM as applicable and the Disposition within this note     Time User Action Codes Description Comment    8/17/2019  6:06 PM Blanca Roberts Add [N39 0] UTI (urinary tract infection)       ED Disposition     ED Disposition Condition Date/Time Comment    Discharge Stable Sat Aug 17, 2019  6:06 PM Zenobia Cerrato discharge to home/self care              Follow-up Information     Follow up With Specialties Details Why Contact Info Additional [address] [facility] Schedule an appointment as soon as possible for a visit  For further evaluation [facility] [address]  [address] [facility] [id number]  [address] [place] [address] For Urology [facility], [address] [place] [address], [place], [id number]          Discharge Medication List as of 8/17/2019  6:07 PM      START taking these medications    Details   cephalexin (KEFLEX) 500 mg capsule Take 1 capsule (500 mg total) by mouth every 12 (twelve) hours for 7 days, Starting Sat 8/17/2019, Until Sat 8/24/2019, Print         CONTINUE these medications which have NOT CHANGED    Details   b complex vitamins tablet Take 1 tablet by mouth daily, Historical Med      calcium carbonate (OS-ROSA) 600 MG tablet Take 1 tablet (600 mg total) by mouth daily, Starting Tue 1/8/2019, Normal      Collagen Hydrolysate POWD by Does not apply route, Historical Med      D-Mannose POWD Take by mouth, Historical Med      ergocalciferol (VITAMIN D2) 50,000 units Take 1 capsule (50,000 Units total) by mouth once a week, Starting Tue 1/29/2019, Normal      gabapentin (NEURONTIN) 300 mg capsule Take 1 capsule (300 mg total) by mouth daily, Starting Fri 10/5/2018, Normal      omega-3-acid ethyl esters (LOVAZA) 1 g capsule Take 2 g by mouth 2 (two) times a day, Historical Med      omeprazole (PriLOSEC) 20 mg delayed release capsule Take 1 capsule (20 mg total) by mouth daily, Starting Thu 8/9/2018, Normal      trospium chloride (SANCTURA) 20 mg tablet Take 1 tablet (20 mg total) by mouth 2 (two) times a day, Starting Fri 5/3/2019, Normal      vitamin E 100 UNIT capsule Take 100 Units by mouth daily, Historical Med           No discharge procedures on file      ED Provider  Electronically Signed by           Orlene Spurling, DO  08/17/19 4299

## 2019-08-18 LAB — BACTERIA UR CULT: ABNORMAL

## 2019-08-21 ENCOUNTER — OFFICE VISIT (OUTPATIENT)
Dept: GASTROENTEROLOGY | Facility: MEDICAL CENTER | Age: 69
End: 2019-08-21
Payer: COMMERCIAL

## 2019-08-21 VITALS
DIASTOLIC BLOOD PRESSURE: 70 MMHG | TEMPERATURE: 98.6 F | HEIGHT: 55 IN | WEIGHT: 139.4 LBS | HEART RATE: 65 BPM | SYSTOLIC BLOOD PRESSURE: 122 MMHG | BODY MASS INDEX: 32.26 KG/M2

## 2019-08-21 DIAGNOSIS — R14.0 BLOATING: ICD-10-CM

## 2019-08-21 DIAGNOSIS — Z12.11 COLON CANCER SCREENING: Primary | ICD-10-CM

## 2019-08-21 DIAGNOSIS — K21.9 GASTROESOPHAGEAL REFLUX DISEASE WITHOUT ESOPHAGITIS: ICD-10-CM

## 2019-08-21 PROCEDURE — 99204 OFFICE O/P NEW MOD 45 MIN: CPT | Performed by: INTERNAL MEDICINE

## 2019-08-21 NOTE — PROGRESS NOTES
[facility] - Outpatient Consultation  Neema Pascal 80 y o  female MRN: [id number]  Encounter: [id number]          ASSESSMENT AND PLAN:      1  Gastroesophageal reflux disease without esophagitis  - patient has acid reflux for many years with intermittent PPI use and poor control  History of hiatus hernia could be exacerbating acid reflux, repeat EGD with evaluation of hiatus hernia,  patient and daughter were counseled on hiatus hernia, if the hernia is large enough, it might need surgical intervention  Biopsy to rule out H pylori infection  - patient and daughter were counseled on importance of lifestyle modification including avoid food that can triggers her symptoms, bed head elevation, avoid food  4 hours before lying down  2  Colon cancer screening  - patient is due for colonoscopy  Plan colonoscopy along with EGD  Patient was counseled on the benefits and risks of endoscopic intervention including but not limited to bleeding, infection, bowel perforation  Patient also understands colonoscopy is not 100% sensitive to detect polyps  3  Bloating  Recent CT scan was negative  Could be functional   Low FODMAP diet is given  If upper endoscopy was negative with persistent symptoms, we will consider to do an abdominal ultrasound to rule out gall stone disease  Follow up in 2 months   ______________________________________________________________________    HPI:      72-year-old female referred for evaluation of acid reflux  Patient reported she has been having acid reflux for many years and she takes PPI only on as needed basis  She takes PPI once every 2 to 3 days  She remains symptomatic  She could not associated certain food with reflux symptoms  She denies weight loss, dysphagia, nausea or vomiting  She denies family history of esophageal cancer  She denies history of smoking    She had an upper endoscopy in 2017 in your work and was told she has hiatus hernia, gastritis  She also reported feeling bloated all the time  She feel her abdomen was distended all the time  She had a CT scan of abdomen and pelvis with IV contrast in 2018 showing lung nodules that will require a follow-up CT and diverticulosis  Patient reported bowel movement is regular, denies family history of colon cancer  She never had a colonoscopy  REVIEW OF SYSTEMS:    CONSTITUTIONAL: Denies any fever, chills, rigors, and weight loss  HEENT: No earache or tinnitus  Denies hearing loss or visual disturbances  CARDIOVASCULAR: No chest pain or palpitations  RESPIRATORY: Denies any cough, hemoptysis, shortness of breath or dyspnea on exertion  GASTROINTESTINAL: As noted in the History of Present Illness  GENITOURINARY: No problems with urination  Denies any hematuria or dysuria  NEUROLOGIC: No dizziness or vertigo, denies headaches  MUSCULOSKELETAL: Denies any muscle or joint pain  SKIN: Denies skin rashes or itching  ENDOCRINE: Denies excessive thirst  Denies intolerance to heat or cold  PSYCHOSOCIAL: Denies depression or anxiety  Denies any recent memory loss         Historical Information   Past Medical History:   Diagnosis Date    Age-related osteoporosis without current pathological fracture 7/25/2018    Class 1 obesity in adult 3/26/2018    Gastroesophageal reflux disease without esophagitis 3/26/2018    Hernia, hiatal     Insomnia     Osteoporosis     Recurrent UTI 5/3/2018    [facility]     Ulcer, gastric, acute     Vitamin D deficiency      Past Surgical History:   Procedure Laterality Date    BREAST LUMPECTOMY Left     HERNIA REPAIR      umbilical hernia     TUBAL LIGATION       Social History   Social History     Substance and Sexual Activity   Alcohol Use No     Social History     Substance and Sexual Activity   Drug Use No     Social History     Tobacco Use   Smoking Status Never Smoker   Smokeless Tobacco Never Used     Family History   Problem Relation Age of Onset    Diabetes Mother        Meds/Allergies       Current Outpatient Medications:     b complex vitamins tablet    calcium carbonate (OS-ROSA) 600 MG tablet    cephalexin (KEFLEX) 500 mg capsule    Collagen Hydrolysate POWD    D-Mannose POWD    omega-3-acid ethyl esters (LOVAZA) 1 g capsule    omeprazole (PriLOSEC) 20 mg delayed release capsule    vitamin E 100 UNIT capsule    ergocalciferol (VITAMIN D2) 50,000 units    gabapentin (NEURONTIN) 300 mg capsule    trospium chloride (SANCTURA) 20 mg tablet    Allergies   Allergen Reactions    Ciprofloxacin Shortness Of Breath    Sulfa Antibiotics Shortness Of Breath     Stress and rash   Nitrofurantoin Nausea Only    Other Nausea Only     macrobid           Objective     Blood pressure 122/70, pulse 65, temperature 98 6 °F (37 °C), height 4' 7" (1 397 m), weight 63 2 kg (139 lb 6 4 oz), not currently breastfeeding  Body mass index is 32 4 kg/m²  PHYSICAL EXAM:      General Appearance:   Alert, cooperative, no distress   HEENT:   Normocephalic, atraumatic, anicteric      Neck:  Supple, symmetrical, trachea midline   Lungs:   Clear to auscultation bilaterally; no rales, rhonchi or wheezing; respirations unlabored    Heart[de-identified]   Regular rate and rhythm; no murmur, rub, or gallop  Abdomen:   Soft, non-tender, non-distended; normal bowel sounds; no masses, no organomegaly    Genitalia:   Deferred    Rectal:   Deferred    Extremities:  No cyanosis, clubbing or edema    Pulses:  2+ and symmetric    Skin:  No jaundice, rashes, or lesions    Lymph nodes:  No palpable cervical lymphadenopathy        Lab Results:   No visits with results within 1 Day(s) from this visit     Latest known visit with results is:   Admission on 08/17/2019, Discharged on 08/17/2019   Component Date Value    Color,  08/17/2019 Yellow     Clarity,  08/17/2019 Cloudy     pH,  08/17/2019 6 0     Leukocytes,  08/17/2019 Moderate*    Nitrite,  08/17/2019 Negative     Protein, UA 08/17/2019 100 (2+)*    Glucose, UA 08/17/2019 Negative     Ketones, UA 08/17/2019 Negative     Urobilinogen, UA 08/17/2019 0 2     Bilirubin, UA 08/17/2019 Negative     Blood, UA 08/17/2019 Large*    Specific [place], UA 08/17/2019 1 015     RBC, UA 08/17/2019 Innumerable*    WBC, UA 08/17/2019 Innumerable*    Epithelial Cells 08/17/2019 Occasional     Bacteria, UA 08/17/2019 Occasional     Urine Culture 08/17/2019 30,000-39,000 cfu/ml Beta Hemolytic Streptococcus Group B*         Radiology Results:   [facility] Kidney And Bladder With Pvr    Result Date: 8/20/2019  Narrative: RENAL ULTRASOUND INDICATION:   N39 0: Urinary tract infection, site not specified  COMPARISON: None TECHNIQUE:   Ultrasound of the retroperitoneum was performed with a curvilinear transducer utilizing volumetric sweeps and still imaging techniques  FINDINGS: KIDNEYS: Symmetric and normal size  Right kidney:  10 8 x 4 4 cm with cortical thickness 0 8 cm  Left kidney:  10 6 x 4 3 cm with cortical thickness 0 7 cm  Right kidney Normal echogenicity and contour  No suspicious masses detected  No hydronephrosis  No shadowing calculi  No perinephric fluid collections  Left kidney Normal echogenicity and contour  No suspicious masses detected  No hydronephrosis  No shadowing calculi  No perinephric fluid collections  URETERS: Nonvisualized  BLADDER: Normally distended  Diverticulum noted  No focal thickening or mass lesions  Bilateral ureteral jets detected  The prevoid bladder volume was 310 mL  The post void bladder volume was 157 mL  Impression: Large post void residual of 157 mL   Workstation performed: [id number]

## 2019-08-21 NOTE — PATIENT INSTRUCTIONS
Colonoscopy and EGD scheduled on 9/10/2019 with Dr Sarahi Gupta [facility] [address] [facility] sample/ instructions given to patient

## 2019-08-23 ENCOUNTER — TELEPHONE (OUTPATIENT)
Dept: FAMILY MEDICINE CLINIC | Facility: CLINIC | Age: 69
End: 2019-08-23

## 2019-08-23 DIAGNOSIS — K21.9 GASTROESOPHAGEAL REFLUX DISEASE WITHOUT ESOPHAGITIS: ICD-10-CM

## 2019-08-23 DIAGNOSIS — R91.1 INCIDENTAL LUNG NODULE, > 3MM AND < 8MM: Primary | ICD-10-CM

## 2019-08-23 NOTE — TELEPHONE ENCOUNTER
Patient's daughter called in and said that her mother went to gastro and they were mentioning about her lung nodule from her ct scan last year  Patient said they were never told about it, however it looks like she was  Can you please review and have someone call her daughter back please?  Thank you

## 2019-08-26 NOTE — TELEPHONE ENCOUNTER
She was told in June 2018 about her lung nodule by me  I ordered follow up CT in July 2018  Based on that CT it is recommended she have another scan in 18-24 from July 2018  She is not due for follow up yet

## 2019-08-26 NOTE — TELEPHONE ENCOUNTER
Daughter aware this is a follow up CT scan  Please enter order  She will call for the appt late Tues or Wed  Thanks!

## 2019-08-27 RX ORDER — OMEPRAZOLE 20 MG/1
20 CAPSULE, DELAYED RELEASE ORAL DAILY
Qty: 90 CAPSULE | Refills: 1 | Status: SHIPPED | OUTPATIENT
Start: 2019-08-27 | End: 2020-01-29 | Stop reason: SDUPTHER

## 2019-08-30 ENCOUNTER — TELEPHONE (OUTPATIENT)
Dept: GASTROENTEROLOGY | Facility: AMBULARY SURGERY CENTER | Age: 69
End: 2019-08-30

## 2019-08-30 NOTE — TELEPHONE ENCOUNTER
DR Bartolome Reyes PT    Pt daughter called requesting to cancel pt procedure because pt son is having major surgery  Pt will call back to r/s

## 2019-09-11 ENCOUNTER — TRANSCRIBE ORDERS (OUTPATIENT)
Dept: ADMINISTRATIVE | Facility: HOSPITAL | Age: 69
End: 2019-09-11

## 2019-10-28 ENCOUNTER — TELEPHONE (OUTPATIENT)
Dept: GASTROENTEROLOGY | Facility: MEDICAL CENTER | Age: 69
End: 2019-10-28

## 2019-10-28 NOTE — TELEPHONE ENCOUNTER
pts daughter called to reschedule cancelled egd/colon with dr Dawit Sepulveda  Pt is scheduled with dr Dawit Sepulveda at [facility] on 12/5/19 opt has suprep sample and instructions  Patient is aware she will need a  to and from   She will get a call the day before with an exact time for arrival

## 2019-12-04 ENCOUNTER — ANESTHESIA EVENT (OUTPATIENT)
Dept: GASTROENTEROLOGY | Facility: MEDICAL CENTER | Age: 69
End: 2019-12-04

## 2019-12-05 ENCOUNTER — HOSPITAL ENCOUNTER (OUTPATIENT)
Dept: GASTROENTEROLOGY | Facility: MEDICAL CENTER | Age: 69
Setting detail: OUTPATIENT SURGERY
Discharge: HOME | End: 2019-12-05
Attending: INTERNAL MEDICINE | Admitting: INTERNAL MEDICINE
Payer: COMMERCIAL

## 2019-12-05 ENCOUNTER — ANESTHESIA (OUTPATIENT)
Dept: GASTROENTEROLOGY | Facility: MEDICAL CENTER | Age: 69
End: 2019-12-05

## 2019-12-05 VITALS
TEMPERATURE: 97.3 F | HEART RATE: 56 BPM | WEIGHT: 139 LBS | RESPIRATION RATE: 20 BRPM | BODY MASS INDEX: 32.17 KG/M2 | DIASTOLIC BLOOD PRESSURE: 66 MMHG | SYSTOLIC BLOOD PRESSURE: 125 MMHG | OXYGEN SATURATION: 97 % | HEIGHT: 55 IN

## 2019-12-05 DIAGNOSIS — K21.9 GASTROESOPHAGEAL REFLUX DISEASE WITHOUT ESOPHAGITIS: ICD-10-CM

## 2019-12-05 DIAGNOSIS — Z12.11 COLON CANCER SCREENING: ICD-10-CM

## 2019-12-05 DIAGNOSIS — R14.0 BLOATING: ICD-10-CM

## 2019-12-05 PROCEDURE — 43239 EGD BIOPSY SINGLE/MULTIPLE: CPT | Performed by: INTERNAL MEDICINE

## 2019-12-05 PROCEDURE — NC001 PR NO CHARGE: Performed by: INTERNAL MEDICINE

## 2019-12-05 PROCEDURE — 88305 TISSUE EXAM BY PATHOLOGIST: CPT | Performed by: PATHOLOGY

## 2019-12-05 PROCEDURE — 45380 COLONOSCOPY AND BIOPSY: CPT | Performed by: INTERNAL MEDICINE

## 2019-12-05 RX ORDER — SODIUM CHLORIDE 9 MG/ML
125 INJECTION, SOLUTION INTRAVENOUS CONTINUOUS
Status: DISCONTINUED | OUTPATIENT
Start: 2019-12-05 | End: 2019-12-09 | Stop reason: HOSPADM

## 2019-12-05 RX ORDER — PROPOFOL 10 MG/ML
INJECTION, EMULSION INTRAVENOUS AS NEEDED
Status: DISCONTINUED | OUTPATIENT
Start: 2019-12-05 | End: 2019-12-05 | Stop reason: SURG

## 2019-12-05 RX ADMIN — PROPOFOL 50 MG: 10 INJECTION, EMULSION INTRAVENOUS at 14:47

## 2019-12-05 RX ADMIN — PROPOFOL 100 MG: 10 INJECTION, EMULSION INTRAVENOUS at 14:32

## 2019-12-05 RX ADMIN — PROPOFOL 50 MG: 10 INJECTION, EMULSION INTRAVENOUS at 14:43

## 2019-12-05 RX ADMIN — PROPOFOL 50 MG: 10 INJECTION, EMULSION INTRAVENOUS at 14:36

## 2019-12-05 RX ADMIN — SODIUM CHLORIDE 125 ML/HR: 0.9 INJECTION, SOLUTION INTRAVENOUS at 14:12

## 2019-12-05 NOTE — H&P
History and Physical -  [facility]  Alexandro Vazquez 77 y o  female MRN: [id number]                  HPI: Alexandro Vazquez is a 77y o  year old female who presents for acid reflux and colon cancer screening      REVIEW OF SYSTEMS: Per the HPI, and otherwise unremarkable  Historical Information   Past Medical History:   Diagnosis Date    Age-related osteoporosis without current pathological fracture 7/25/2018    Class 1 obesity in adult 3/26/2018    Gastroesophageal reflux disease without esophagitis 3/26/2018    Hernia, hiatal     Insomnia     Osteoporosis     Recurrent UTI 5/3/2018    Stroke ([facility] [address] )     Ulcer, gastric, acute     Vitamin D deficiency      Past Surgical History:   Procedure Laterality Date    BREAST LUMPECTOMY Left     HERNIA REPAIR      umbilical hernia     TUBAL LIGATION       Social History   Social History     Substance and Sexual Activity   Alcohol Use No     Social History     Substance and Sexual Activity   Drug Use No     Social History     Tobacco Use   Smoking Status Never Smoker   Smokeless Tobacco Never Used     Family History   Problem Relation Age of Onset    Diabetes Mother        Meds/Allergies       (Not in a hospital admission)    Allergies   Allergen Reactions    Ciprofloxacin Shortness Of Breath    Sulfa Antibiotics Shortness Of Breath     Stress and rash   Nitrofurantoin Nausea Only    Other Nausea Only     macrobid       Objective     /79   Pulse 70   Temp (!) 97 3 °F (36 3 °C) (Temporal)   Resp 16   Ht 4' 7" (1 397 m)   Wt 63 kg (139 lb)   SpO2 98%   BMI 32 31 kg/m²       PHYSICAL EXAM    Gen: NAD  CV: RRR  CHEST: Clear  ABD: soft, NT/ND  EXT: no edema      ASSESSMENT/PLAN:  This is a 77y o  year old female here for acid reflux and colon cancer screening, and she is stable and optimized for her procedure

## 2019-12-05 NOTE — DISCHARGE INSTRUCTIONS
Colonoscopia   LO QUE NECESITA SABER:   Juan colonoscopia es un procedimiento para examinar con un endoscopio el interior de king colon (intestino)  Sena juan colonoscopia, es posible que le retiren pólipos o crecimientos de tejidos  Es normal que se sienta inflamado o que tenga molestia abdominal  Usted debería estar expulsando los gases  Si tiene hemorroides o si le removieron pólipos, usted podría presentar juan pequeña cantidad de sangrado  INSTRUCCIONES SOBRE EL SOREN HOSPITALARIA:   Busque atención médica de inmediato si:   · Usted presenta juan cantidad jun de vincent aurora brillante en fabby evacuaciones intestinales  · King abdomen está kj y firme y usted siente dolor intenso  · Usted tiene dificultad repentina para respirar  Pregúntele a king Artemio Riding vitaminas y minerales son adecuados para usted  · Usted presenta sarpullido o urticaria  · Usted tiene fiebre dentro de las 24 horas después de king procedimiento  · Usted no ha tenido juan evacuación intestinal después de 3 días de king procedimiento  · Usted tiene preguntas o inquietudes acerca de king condición o cuidado  Actividad:   · No levante nada, no se esfuerce o corra  por 3 días después de king procedimiento  · Descanse después de king procedimiento  A usted le berry administrado medicamento para relajarse  No  maneje o tome decisiones importantes hasta el día siguiente de king procedimiento  Regrese a fabby actividades normales según le indiquen  · Alivie los gases y la incomodidad de la inflamación  acostándose en king costado derecho con juan almohada térmica sobre king abdomen  Es posible que necesite caminar un poco para ayudar a eliminar los gases  Coma comidas pequeñas hasta que se alivie de la inflamación  Si a usted le removieron pólipos:  Por 7 días después de king procedimiento:  · No  tome aspirina  · No  realice paseos largos en yonny  Ayude a prevenir el estreñimiento:   · Consuma alimentos saludables y variados    Los alimentos saludables incluyen fruta, vegetales, panes integrales, productos lácteos bajo en grasa, frijoles, radha sin grasa, y pescado  Pregunte si necesita seguir juan dieta especial  King médico puede recomendarle que coma alimentos ricos en fibra, manjit frijoles cocidos  La fibra lo ayuda a tener evacuaciones intestinales regulares  · [address] se le haya indicado  Los adultos deberían de beber entre 9 a 13 vasos de 8 onzas de líquidos cada día  Pregunte cuál es la cantidad [place] para usted  Para [facility], los mejores líquidos son Ruthe Nicely, y [place]  · Ejercítese según indicaciones  Consulte con king médico acerca de cuál es el mejor régimen de ejercicio para usted  El ejercicio puede ayudar a prevenir estreñimiento, reducir king presión arterial y [employer]  Acuda a fabby consultas de control con king médico según le indicaron  Anote fabby preguntas para que se acuerde de hacerlas didier fabby visitas  © 2017 [address] Information is for End User's use only and may not be sold, redistributed or otherwise used for commercial purposes  All illustrations and images included in CareNotes® are the copyrighted property of A D A M , Inc  or Abilio Ariza  Esta información es sólo para uso en educación  King intención no es darle un consejo médico sobre enfermedades o tratamientos  Colsulte con king Stephanie Horde farmacéutico antes de seguir cualquier régimen médico para saber si es seguro y efectivo para usted  Endoscopia superior   LO QUE NECESITA SABER:   Juan endoscopia superior también se conoce manjit endoscopia gastrointestinal (GI) superior o esofagogastroduodenoscopia (EGD)  Usted podría sentirse inflamado, con gases o sentir molestia abdominal después de king procedimiento  King garganta podría estar adolorida por 24 a 36 horas  Usted podría eructar o expulsar gas debido al aire que todavía está en king cuerpo    INSTRUCCIONES SOBRE EL SOREN HOSPITALARIA:   Llame al 911 en ari de presentar lo siguiente:   · Tiene dolor en el pecho o dificultad para respirar de forma repentina  Busque atención médica de inmediato si:   · Está mareado o siente que se va a desmayar  · Usted tiene dificultad para tragar  · Imani evacuaciones intestinales son [place] o negras  · Pineda abdomen está kj y firme y usted siente dolor intenso  · Usted vomita vincent  Pregúntele a pineda Starlette Mems vitaminas y minerales son adecuados para usted  · Usted se siente lleno o hinchado y no puede eructar o expulsar gas  · Usted no ha tenido juan evacuación intestinal después de 3 días de pineda procedimiento  · Usted tiene dolor de ariella  · Usted tiene fiebre o escalofríos  · Usted tiene náuseas o está vomitando  · Usted tiene salpullido o urticaria  · Usted tiene preguntas o inquietudes acerca de pineda endoscopia  Alivie el dolor de garganta:  Chupe pastillas para la garganta o hielo triturado  Renzo gárgaras con juan pequeña cantidad de agua tibia con sal  Mezcle 1 cucharadita de sal y 1 taza de agua tibia para hacer agua salada  Alivie el gas y la molestia de la inflamación:  Acuéstese sobre pineda costado derecho con juan almohada térmica sobre pineda abdomen  Camine un poco para ayudar a expulsar el gas  Coma comidas pequeñas hasta que se alivie de la inflamación  Descanse después de pineda procedimiento:  A usted le berry administrado medicamento para relajarse  No  maneje o tome decisiones importantes hasta el día siguiente de pineda procedimiento  Regrese a imani actividades normales según le indiquen  Usted generalmente puede regresar al [place] Imam al día siguiente de pineda procedimiento  Acuda a imani consultas de control con pineda médico según le indicaron  Anote imani preguntas para que se acuerde de hacerlas didier imani visitas  © 2017 mario [place]   Information is for End User's use only and may not be sold, redistributed or otherwise used for commercial purposes  All illustrations and images included in CareNotes® are the copyrighted property of A KIAN A M , Inc  or Abilio Ariza  Esta información es sólo para uso en educación  Pineda intención no es darle un consejo médico sobre enfermedades o tratamientos  Colsulte con pineda Buddy Chill farmacéutico antes de seguir cualquier régimen médico para saber si es seguro y efectivo para usted  Pólipos colorrectales   LO QUE NECESITA SABER:   Los pólipos colorrectales son pequeñas protuberancias de tejido que se encuentran en el forro del colon y recto  Benjamen Pyo de los pólipos son hiperplásticos y típicamente son benignos (no cancerosos)  Ciertos tipos de pólipos llamados adenomatosos, podrían convertirse en cáncer  INSTRUCCIONES SOBRE EL SOREN HOSPITALARIA:   Paul juan apolinar de seguimiento con pineda médico o gastroenterólogo manjit le indiquen:  Es probable que usted tenga que regresar para hacerse pruebas adicionales manjit otra colonoscopia  Anote fabby preguntas para que se acuerde de hacerlas didier fabby visitas  Reduzca pineda riesgo de pólipos colorrectales:   · Consuma alimentos saludables y variados:  Los alimentos saludables incluyen fruta, vegetales, panes integrales, productos lácteos bajo en grasa, frijoles, radha sin grasa, y pescado  Pregunte si necesita seguir juan dieta especial     · Mantenga un peso saludable:  Pregunte al médico si necesita perder peso y cuánto  Pida ayuda con un programa para bajar de [place]  · Ejercicio:  Yalobusha Sax lentamente y paul más a medida que se sienta más geovanny  Consulte con pineda médico antes de empezar un régimen de ejercicios  · Limite el consumo de alcohol  Pineda riesgo de tener pólipos aumenta cuanto más se ruiz  · No fume:  Si usted fuma, nunca es demasiado tarde para dejar de hacerlo  Pida información para dejar de fumar    Para [de-identified] y más información:   · Yoni Ren (NDDIC)  [address] [place] [address]  Phone: [phone]  Web Address: www digestive  niddk nih gov  Comuníquese con king médico o gastroenterólogo si:   · Usted tiene fiebre  · Usted tiene escalofríos, tos o se siente débil y adolorido  · Usted tiene dolor abdominal que no desaparece o aumenta después de fernandez king medicamento  · King abdomen se encuentra inflamado  · Usted pierde peso sin proponérselo  · Usted tiene preguntas o inquietudes acerca de king condición o cuidado  Busque atención médica de inmediato o llame al 911 si:   · Usted tiene falta de aire repentina  · Tiene el ritmo cardíaco acelerado, la respiración acelerada o está demasiado mareado o débil para pararse  · Usted tiene dolor abdominal intenso  · Usted ve vincent en fabby deposiciones  © 2017 [address]  Information is for End User's use only and may not be sold, redistributed or otherwise used for commercial purposes  All illustrations and images included in CareNotes® are the copyrighted property of A D A M , Inc  or Abilio Ariza  Esta información es sólo para uso en educación  King intención no es darle un consejo médico sobre enfermedades o tratamientos  Colsulte con king Art Ping farmacéutico antes de seguir cualquier régimen médico para saber si es seguro y efectivo para usted

## 2019-12-05 NOTE — ANESTHESIA PREPROCEDURE EVALUATION
Review of Systems/Medical History          Cardiovascular  Negative cardio ROS    Pulmonary  Negative pulmonary ROS        GI/Hepatic    PUD, GERD ,  Hiatal hernia,          Comment: UTI     Endo/Other    Obesity    GYN       Hematology  Negative hematology ROS      Musculoskeletal  Negative musculoskeletal ROS        Neurology    CVA ,    Psychology   Negative psychology ROS              Physical Exam    Airway    Mallampati score: II  TM Distance: >3 FB  Neck ROM: full     Dental       Cardiovascular  Comment: Negative ROS, Rhythm: regular, Rate: normal, Cardiovascular exam normal    Pulmonary  Pulmonary exam normal     Other Findings        Anesthesia Plan  ASA Score- 3     Anesthesia Type- IV sedation with anesthesia with ASA Monitors  Additional Monitors:   Airway Plan:         Plan Factors-    Induction-     Postoperative Plan-     Informed Consent- Anesthetic plan and risks discussed with patient

## 2019-12-18 ENCOUNTER — APPOINTMENT (OUTPATIENT)
Dept: LAB | Facility: CLINIC | Age: 69
End: 2019-12-18
Payer: COMMERCIAL

## 2019-12-18 ENCOUNTER — OFFICE VISIT (OUTPATIENT)
Dept: FAMILY MEDICINE CLINIC | Facility: CLINIC | Age: 69
End: 2019-12-18
Payer: COMMERCIAL

## 2019-12-18 VITALS
WEIGHT: 141 LBS | BODY MASS INDEX: 32.63 KG/M2 | TEMPERATURE: 97.9 F | HEART RATE: 64 BPM | DIASTOLIC BLOOD PRESSURE: 52 MMHG | HEIGHT: 55 IN | SYSTOLIC BLOOD PRESSURE: 122 MMHG | RESPIRATION RATE: 18 BRPM

## 2019-12-18 DIAGNOSIS — Z13.6 SCREENING FOR CARDIOVASCULAR CONDITION: ICD-10-CM

## 2019-12-18 DIAGNOSIS — E66.09 CLASS 1 OBESITY DUE TO EXCESS CALORIES WITHOUT SERIOUS COMORBIDITY WITH BODY MASS INDEX (BMI) OF 32.0 TO 32.9 IN ADULT: ICD-10-CM

## 2019-12-18 DIAGNOSIS — E55.9 VITAMIN D DEFICIENCY: ICD-10-CM

## 2019-12-18 DIAGNOSIS — Z13.1 SCREENING FOR DIABETES MELLITUS: ICD-10-CM

## 2019-12-18 DIAGNOSIS — Z23 ENCOUNTER FOR IMMUNIZATION: ICD-10-CM

## 2019-12-18 DIAGNOSIS — Z00.00 MEDICARE ANNUAL WELLNESS VISIT, SUBSEQUENT: Primary | ICD-10-CM

## 2019-12-18 DIAGNOSIS — Z12.39 BREAST CANCER SCREENING: ICD-10-CM

## 2019-12-18 DIAGNOSIS — H91.93 BILATERAL HEARING LOSS, UNSPECIFIED HEARING LOSS TYPE: ICD-10-CM

## 2019-12-18 LAB
25(OH)D3 SERPL-MCNC: 27 NG/ML (ref 30–100)
ALBUMIN SERPL BCP-MCNC: 4.2 G/DL (ref 3.5–5)
ALP SERPL-CCNC: 76 U/L (ref 46–116)
ALT SERPL W P-5'-P-CCNC: 23 U/L (ref 12–78)
ANION GAP SERPL CALCULATED.3IONS-SCNC: 1 MMOL/L (ref 4–13)
AST SERPL W P-5'-P-CCNC: 20 U/L (ref 5–45)
BILIRUB SERPL-MCNC: 0.71 MG/DL (ref 0.2–1)
BUN SERPL-MCNC: 12 MG/DL (ref 5–25)
CALCIUM SERPL-MCNC: 9.7 MG/DL (ref 8.3–10.1)
CHLORIDE SERPL-SCNC: 108 MMOL/L (ref 100–108)
CHOLEST SERPL-MCNC: 208 MG/DL (ref 50–200)
CO2 SERPL-SCNC: 29 MMOL/L (ref 21–32)
CREAT SERPL-MCNC: 0.75 MG/DL (ref 0.6–1.3)
GFR SERPL CREATININE-BSD FRML MDRD: 82 ML/MIN/1.73SQ M
GLUCOSE P FAST SERPL-MCNC: 83 MG/DL (ref 65–99)
HDLC SERPL-MCNC: 60 MG/DL
LDLC SERPL CALC-MCNC: 114 MG/DL (ref 0–100)
NONHDLC SERPL-MCNC: 148 MG/DL
POTASSIUM SERPL-SCNC: 4.2 MMOL/L (ref 3.5–5.3)
PROT SERPL-MCNC: 8.2 G/DL (ref 6.4–8.2)
SODIUM SERPL-SCNC: 138 MMOL/L (ref 136–145)
TRIGL SERPL-MCNC: 170 MG/DL

## 2019-12-18 PROCEDURE — 3008F BODY MASS INDEX DOCD: CPT | Performed by: NURSE PRACTITIONER

## 2019-12-18 PROCEDURE — 1160F RVW MEDS BY RX/DR IN RCRD: CPT | Performed by: NURSE PRACTITIONER

## 2019-12-18 PROCEDURE — 82306 VITAMIN D 25 HYDROXY: CPT

## 2019-12-18 PROCEDURE — G0439 PPPS, SUBSEQ VISIT: HCPCS | Performed by: NURSE PRACTITIONER

## 2019-12-18 PROCEDURE — 1125F AMNT PAIN NOTED PAIN PRSNT: CPT | Performed by: NURSE PRACTITIONER

## 2019-12-18 PROCEDURE — 36415 COLL VENOUS BLD VENIPUNCTURE: CPT | Performed by: NURSE PRACTITIONER

## 2019-12-18 PROCEDURE — 1101F PT FALLS ASSESS-DOCD LE1/YR: CPT | Performed by: NURSE PRACTITIONER

## 2019-12-18 PROCEDURE — 1170F FXNL STATUS ASSESSED: CPT | Performed by: NURSE PRACTITIONER

## 2019-12-18 PROCEDURE — 90662 IIV NO PRSV INCREASED AG IM: CPT

## 2019-12-18 PROCEDURE — 1036F TOBACCO NON-USER: CPT | Performed by: NURSE PRACTITIONER

## 2019-12-18 PROCEDURE — G0008 ADMIN INFLUENZA VIRUS VAC: HCPCS

## 2019-12-18 PROCEDURE — 80053 COMPREHEN METABOLIC PANEL: CPT | Performed by: NURSE PRACTITIONER

## 2019-12-18 PROCEDURE — 3725F SCREEN DEPRESSION PERFORMED: CPT | Performed by: NURSE PRACTITIONER

## 2019-12-18 PROCEDURE — 80061 LIPID PANEL: CPT | Performed by: NURSE PRACTITIONER

## 2019-12-18 RX ORDER — B-COMPLEX WITH VITAMIN C
1 TABLET ORAL
COMMUNITY

## 2019-12-18 NOTE — PROGRESS NOTES
Assessment and Plan:     Problem List Items Addressed This Visit        Other    Class 1 obesity in adult      Other Visit Diagnoses     Medicare annual wellness visit, subsequent    -  Primary    Encounter for immunization        Relevant Orders    influenza vaccine, 3683-2332, high-dose, PF 0 5 mL (FLUZONE HIGH-DOSE) (Completed)    Breast cancer screening        Relevant Orders    Mammo screening bilateral w 3d & cad    Screening for diabetes mellitus        Relevant Orders    Comprehensive metabolic panel    Screening for cardiovascular condition        Relevant Orders    Lipid panel    Bilateral hearing loss, unspecified hearing loss type        Relevant Orders    Ambulatory referral to Audiology        BMI Counseling: Body mass index is 32 77 kg/m²  The BMI is above normal  Nutrition recommendations include decreasing portion sizes, decreasing fast food intake, moderation in carbohydrate intake, reducing intake of saturated and trans fat and reducing intake of cholesterol  Exercise recommendations include exercising 3-5 times per week and strength training exercises  Preventive health issues were discussed with patient, and age appropriate screening tests were ordered as noted in patient's After Visit Summary  Personalized health advice and appropriate referrals for health education or preventive services given if needed, as noted in patient's After Visit Summary  History of Present Illness:     Patient presents for Welcome to Medicare visit  Patient Care Team:  Rufino Mendoza as PCP - General (Family Medicine)     Review of Systems:     Review of Systems   Constitutional: Negative for fatigue  HENT: Positive for hearing loss  Respiratory: Negative for chest tightness and shortness of breath  Cardiovascular: Negative for chest pain and palpitations  Gastrointestinal: Negative for constipation  Genitourinary: Negative for difficulty urinating     Neurological: Negative for dizziness, light-headedness and headaches  Psychiatric/Behavioral: Negative for dysphoric mood and sleep disturbance  The patient is not nervous/anxious         Problem List:     Patient Active Problem List   Diagnosis    Gastroesophageal reflux disease without esophagitis    Primary insomnia    Class 1 obesity in adult    Alopecia areata    Urinary frequency    Recurrent UTI    Incidental lung nodule, > 3mm and < 8mm    Loss of height    Post-menopausal    Age-related osteoporosis without current pathological fracture    Vitamin D deficiency      Past Medical and Surgical History:     Past Medical History:   Diagnosis Date    Age-related osteoporosis without current pathological fracture 7/25/2018    Class 1 obesity in adult 3/26/2018    Gastroesophageal reflux disease without esophagitis 3/26/2018    Hernia, hiatal     Insomnia     Osteoporosis     Recurrent UTI 5/3/2018    Stroke ([facility] [address]     Ulcer, gastric, acute     Vitamin D deficiency      Past Surgical History:   Procedure Laterality Date    BREAST LUMPECTOMY Left     HERNIA REPAIR      umbilical hernia     TUBAL LIGATION        Family History:     Family History   Problem Relation Age of Onset    Diabetes Mother       Social History:     Social History     Socioeconomic History    Marital status: /Civil Union     Spouse name: None    Number of children: None    Years of education: None    Highest education level: None   Occupational History    None   Social Needs    Financial resource strain: None    Food insecurity:     Worry: None     Inability: None    Transportation needs:     Medical: None     Non-medical: None   Tobacco Use    Smoking status: Never Smoker    Smokeless tobacco: Never Used   Substance and Sexual Activity    Alcohol use: No    Drug use: No    Sexual activity: Never   Lifestyle    Physical activity:     Days per week: None     Minutes per session: None    Stress: None   Relationships    Social connections:     Talks on phone: None     Gets together: None     Attends Hoahaoism service: None     Active member of club or organization: None     Attends meetings of clubs or organizations: None     Relationship status: None    Intimate partner violence:     Fear of current or ex partner: None     Emotionally abused: None     Physically abused: None     Forced sexual activity: None   Other Topics Concern    None   Social History Narrative    None      Medications and Allergies:     Current Outpatient Medications   Medication Sig Dispense Refill    b complex vitamins tablet Take 1 tablet by mouth daily      calcium carbonate (OS-ROSA) 600 MG tablet Take 1 tablet (600 mg total) by mouth daily 90 tablet 5    calcium carbonate-vitamin D (OSCAL-D) 500 mg-200 units per tablet Take 1 tablet by mouth daily with breakfast      Collagen Hydrolysate POWD by Does not apply route      D-Mannose POWD Take by mouth      omega-3-acid ethyl esters (LOVAZA) 1 g capsule Take 2 g by mouth 2 (two) times a day      omeprazole (PriLOSEC) 20 mg delayed release capsule Take 1 capsule (20 mg total) by mouth daily 90 capsule 1    vitamin E 100 UNIT capsule Take 100 Units by mouth daily      ergocalciferol (VITAMIN D2) 50,000 units Take 1 capsule (50,000 Units total) by mouth once a week (Patient not taking: Reported on 12/18/2019) 12 capsule 0    gabapentin (NEURONTIN) 300 mg capsule Take 1 capsule (300 mg total) by mouth daily (Patient not taking: Reported on 12/18/2019) 30 capsule 3    trospium chloride (SANCTURA) 20 mg tablet Take 1 tablet (20 mg total) by mouth 2 (two) times a day (Patient not taking: Reported on 8/1/2019) 60 tablet 5     No current facility-administered medications for this visit  Allergies   Allergen Reactions    Ciprofloxacin Shortness Of Breath    Sulfa Antibiotics Shortness Of Breath     Stress and rash      Nitrofurantoin Nausea Only    Other Nausea Only     macrobid      Immunizations: Immunization History   Administered Date(s) Administered    Influenza, high dose seasonal 0 5 mL 09/20/2018, 12/18/2019    Pneumococcal Conjugate 13-Valent 06/25/2018      Health Maintenance:         Topic Date Due    MAMMOGRAM  07/17/2019    CRC Screening: Colonoscopy  12/05/2024    Hepatitis C Screening  Completed         Topic Date Due    Pneumococcal Vaccine: 65+ Years (2 of 2 - PPSV23) 06/25/2019      Medicare Screening Tests and Risk Assessments:     Alona Corona is here for her Subsequent Wellness visit  Health Risk Assessment:   Patient rates overall health as good  Patient feels that their physical health rating is same  Eyesight was rated as slightly worse  Hearing was rated as much worse  Patient feels that their emotional and mental health rating is same  Pain experienced in the last 7 days has been none  Patient states that she has experienced no weight loss or gain in last 6 months  Depression Screening:   PHQ-2 Score: 0      Fall Risk Screening: In the past year, patient has experienced: no history of falling in past year      Urinary Incontinence Screening:   Patient has not leaked urine accidently in the last six months  Home Safety:  Patient does not have trouble with stairs inside or outside of their home  Patient has working smoke alarms and has working carbon monoxide detector  Home safety hazards include: none  Nutrition:   Current diet is Regular  Medications:   Patient is currently taking over-the-counter supplements  OTC medications include: see medication list  Patient is able to manage medications  Activities of Daily Living (ADLs)/Instrumental Activities of Daily Living (IADLs):   Walk and transfer into and out of bed and chair?: Yes  Dress and groom yourself?: Yes    Bathe or shower yourself?: Yes    Feed yourself?  Yes  Do your laundry/housekeeping?: Yes  Manage your money, pay your bills and track your expenses?: Yes  Make your own meals?: Yes    Do your own shopping?: Yes    Previous Hospitalizations:   Any hospitalizations or ED visits within the last 12 months?: Yes    How many hospitalizations have you had in the last year?: 1-2    Hospitalization Comments: Seen for UTI    Advance Care Planning:   Living will: No    Durable POA for healthcare: No    Advanced directive: No    Advanced directive counseling given: Yes    Five wishes given: Yes      PREVENTIVE SCREENINGS      Cardiovascular Screening:    General: Screening Current      Diabetes Screening:     General: Screening Current      Colorectal Cancer Screening:     General: Screening Current      Breast Cancer Screening:     General: Screening Current      Cervical Cancer Screening:    General: Screening Not Indicated      Osteoporosis Screening:    General: History Osteoporosis and Screening Current      Abdominal Aortic Aneurysm (AAA) Screening:        General: Screening Not Indicated      Lung Cancer Screening:     General: Screening Current      Hepatitis C Screening:    General: Screening Current    No exam data present     Physical Exam:     /52 (BP Location: Left arm, Patient Position: Sitting, Cuff Size: Standard)   Pulse 64   Temp 97 9 °F (36 6 °C) (Temporal)   Resp 18   Ht 4' 7" (1 397 m)   Wt 64 kg (141 lb)   BMI 32 77 kg/m²     Physical Exam   Constitutional: She is oriented to person, place, and time  Vital signs are normal  She appears well-developed and well-nourished  She does not have a sickly appearance  She does not appear ill  HENT:   Head: Normocephalic and atraumatic  Right Ear: Tympanic membrane normal    Mouth/Throat: Uvula is midline, oropharynx is clear and moist and mucous membranes are normal    Left ear cerumen impaction    Eyes: Pupils are equal, round, and reactive to light  Neck: No JVD present  Carotid bruit is not present  No thyromegaly present  Cardiovascular: Normal rate, regular rhythm, S1 normal and S2 normal    No murmur heard    Varicose veins bilateral lower extremities    Pulmonary/Chest: Effort normal and breath sounds normal    Abdominal: Normal appearance  There is no tenderness  Lymphadenopathy:     She has no cervical adenopathy  Neurological: She is alert and oriented to person, place, and time  She has normal strength  Skin: Skin is warm, dry and intact  No rash noted  Psychiatric: She has a normal mood and affect   Her behavior is normal  Thought content normal        WAI Carter

## 2019-12-20 ENCOUNTER — TELEPHONE (OUTPATIENT)
Dept: GASTROENTEROLOGY | Facility: MEDICAL CENTER | Age: 69
End: 2019-12-20

## 2019-12-20 NOTE — TELEPHONE ENCOUNTER
----- Message from Orquidea Mcadams MD sent at 12/17/2019  2:40 PM EST -----  Biopsy showed inactive gastritis on EGD and polyp removed from colon was precancerous, repeat colonoscopy in 5 years

## 2020-01-06 ENCOUNTER — HOSPITAL ENCOUNTER (OUTPATIENT)
Dept: CT IMAGING | Facility: HOSPITAL | Age: 70
Discharge: HOME | End: 2020-01-06
Payer: COMMERCIAL

## 2020-01-06 DIAGNOSIS — R91.1 INCIDENTAL LUNG NODULE, > 3MM AND < 8MM: ICD-10-CM

## 2020-01-06 PROCEDURE — 71250 CT THORAX DX C-: CPT

## 2020-01-07 NOTE — TELEPHONE ENCOUNTER
pts daughter would like a call with results as well    [facility] # [phone]

## 2020-01-07 NOTE — TELEPHONE ENCOUNTER
Called patient using  phone # [phone], message was left in Yoruba for patient to call office    mailed letter to patient to call office

## 2020-01-15 ENCOUNTER — OFFICE VISIT (OUTPATIENT)
Dept: FAMILY MEDICINE CLINIC | Facility: CLINIC | Age: 70
End: 2020-01-15
Payer: COMMERCIAL

## 2020-01-15 VITALS
HEIGHT: 55 IN | WEIGHT: 139 LBS | HEART RATE: 56 BPM | BODY MASS INDEX: 32.17 KG/M2 | SYSTOLIC BLOOD PRESSURE: 134 MMHG | DIASTOLIC BLOOD PRESSURE: 70 MMHG

## 2020-01-15 DIAGNOSIS — Z23 NEED FOR PNEUMOCOCCAL VACCINATION: ICD-10-CM

## 2020-01-15 DIAGNOSIS — R91.8 MULTIPLE LUNG NODULES: Primary | ICD-10-CM

## 2020-01-15 DIAGNOSIS — M81.0 AGE-RELATED OSTEOPOROSIS WITHOUT CURRENT PATHOLOGICAL FRACTURE: ICD-10-CM

## 2020-01-15 DIAGNOSIS — R59.0 HILAR ADENOPATHY: ICD-10-CM

## 2020-01-15 PROCEDURE — 99214 OFFICE O/P EST MOD 30 MIN: CPT | Performed by: NURSE PRACTITIONER

## 2020-01-15 PROCEDURE — 90732 PPSV23 VACC 2 YRS+ SUBQ/IM: CPT

## 2020-01-15 PROCEDURE — G0009 ADMIN PNEUMOCOCCAL VACCINE: HCPCS

## 2020-01-15 PROCEDURE — 3008F BODY MASS INDEX DOCD: CPT | Performed by: NURSE PRACTITIONER

## 2020-01-15 NOTE — ASSESSMENT & PLAN NOTE
Patient several pulmonary nodules now; which all appear stable  The recommendation currently is CT scan in July 2020  However will send patient to pulmonary for second opinion and address adenopathy

## 2020-01-15 NOTE — PROGRESS NOTES
Assessment and Plan:    Problem List Items Addressed This Visit        Musculoskeletal and Integument    Age-related osteoporosis without current pathological fracture     Patient takes bone health supplement with vitamin d and calcium  She did see specialist in march of 2019 for reclast infusion  She is due to update her DEXA; last DEXA was 7/17/2018  Relevant Orders    DXA bone density spine hip and pelvis       Immune and Lymphatic    Hilar adenopathy     Will refer to pulmonary for consult eval          Relevant Orders    Ambulatory referral to Pulmonology      Other Visit Diagnoses     Multiple lung nodules    -  Primary    Relevant Orders    Ambulatory referral to Pulmonology                 Diagnoses and all orders for this visit:    Multiple lung nodules  -     Ambulatory referral to Pulmonology; Future    Hilar adenopathy  -     Ambulatory referral to Pulmonology; Future    Age-related osteoporosis without current pathological fracture  -     DXA bone density spine hip and pelvis; Future    Other orders  -     LYSINE PO; Take by mouth  -     Bone Meal-Vitamin D (BONE MEAL PO); Take by mouth              Subjective:      Patient ID: Yoan Peña is a 77 y o  female  CC:    Chief Complaint   Patient presents with    Follow-up     patient is here to review CT scan results  ak       HPI:    Patient here to go over her Ct scan results  Patient states about 5 years ago she was working in a facility with methanol; she is not sure if her exposure to this is what has caused her lung nodules  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  Patient denies any family history of cancer except for brother who was 61years old at diagnosis  Patient suffers from severe osteoporosis and is receiving infusions for this   Patient states she does not like to go out in the son, she states she has a poor diet and is very picky eater, does not consume milk products or eggs        The following portions of the patient's history were reviewed and updated as appropriate: allergies, current medications, past family history, past medical history, past social history, past surgical history and problem list       Review of Systems   Constitutional: Negative for diaphoresis, fever and unexpected weight change  HENT: Negative for trouble swallowing  Respiratory: Negative for chest tightness and shortness of breath  Cardiovascular: Negative for chest pain and palpitations  Gastrointestinal: Negative for constipation  Genitourinary: Negative for difficulty urinating  Musculoskeletal: Positive for back pain  Neurological: Negative for dizziness, light-headedness and headaches  Data to review:       Objective:    Vitals:    01/15/20 1054   BP: 134/70   Pulse: 56   Weight: 63 kg (139 lb)   Height: 4' 7 3" (1 405 m)        Physical Exam   Constitutional: She is oriented to person, place, and time  She appears well-developed and well-nourished  HENT:   Head: Normocephalic and atraumatic  Cardiovascular: Normal rate, regular rhythm and normal heart sounds  Pulmonary/Chest: Effort normal and breath sounds normal  No stridor  She has no wheezes  She has no rales  Neurological: She is alert and oriented to person, place, and time  Psychiatric: She has a normal mood and affect  Thought content normal    Nursing note and vitals reviewed

## 2020-01-15 NOTE — ASSESSMENT & PLAN NOTE
Patient takes bone health supplement with vitamin d and calcium  She did see specialist in march of 2019 for reclast infusion  She is due to update her DEXA; last DEXA was 7/17/2018

## 2020-01-29 DIAGNOSIS — K21.9 GASTROESOPHAGEAL REFLUX DISEASE WITHOUT ESOPHAGITIS: ICD-10-CM

## 2020-01-29 RX ORDER — OMEPRAZOLE 20 MG/1
20 CAPSULE, DELAYED RELEASE ORAL DAILY
Qty: 90 CAPSULE | Refills: 1 | Status: SHIPPED | OUTPATIENT
Start: 2020-01-29 | End: 2020-05-19 | Stop reason: SDUPTHER

## 2020-01-30 ENCOUNTER — CONSULT (OUTPATIENT)
Dept: PULMONOLOGY | Facility: CLINIC | Age: 70
End: 2020-01-30
Payer: COMMERCIAL

## 2020-01-30 VITALS
WEIGHT: 139 LBS | RESPIRATION RATE: 16 BRPM | SYSTOLIC BLOOD PRESSURE: 112 MMHG | HEART RATE: 68 BPM | TEMPERATURE: 98.8 F | BODY MASS INDEX: 32.17 KG/M2 | OXYGEN SATURATION: 98 % | HEIGHT: 55 IN | DIASTOLIC BLOOD PRESSURE: 70 MMHG

## 2020-01-30 DIAGNOSIS — R59.0 HILAR ADENOPATHY: ICD-10-CM

## 2020-01-30 DIAGNOSIS — R91.8 MULTIPLE LUNG NODULES: ICD-10-CM

## 2020-01-30 DIAGNOSIS — R91.1 INCIDENTAL LUNG NODULE, > 3MM AND < 8MM: Primary | ICD-10-CM

## 2020-01-30 PROCEDURE — 99204 OFFICE O/P NEW MOD 45 MIN: CPT | Performed by: INTERNAL MEDICINE

## 2020-01-30 PROCEDURE — 4040F PNEUMOC VAC/ADMIN/RCVD: CPT | Performed by: INTERNAL MEDICINE

## 2020-01-30 PROCEDURE — 1160F RVW MEDS BY RX/DR IN RCRD: CPT | Performed by: INTERNAL MEDICINE

## 2020-01-30 NOTE — PROGRESS NOTES
Consultation - Pulmonary Medicine   Akbar Anaya 77 y o  female MRN: [id number]        Physician Requesting Consult:  WAI Reyes  Reason for Consult:  Pulmonary nodules    Incidental lung nodule, > 3mm and < 8mm  · Patient has 3 small lung nodules, 5 mm or less in size  These has been imaged over 18 months, first noted on abdominal CT scan will in February 2018  · Patient has no pulmonary malignancy risk factors  · Based on the size of these nodules and current Fleischner Society guidelines, no further follow-up is recommended  · The recommendation for a 24 months study would be optional in a high risk individual for nodules of this size  · I have therefore not recommended any further CT imaging  These nodules may be considered benign based on radiographic characteristics    I will see the patient back only on an as-needed basis  Please do not hesitate to call with any questions or concerns  ______________________________________________________________________    HPI:    Akbar Anaya is a 77 y o  female who presents for evaluation of lung nodules  These were initially identified on abdominal CT done for back pain associated with UTI  She subsequently went on to have a follow-up study and most recently had a CT scan demonstrating nodules less than 6 mm in size without any significant interval growth or change  She denies any pulmonary symptoms  She has no wheezing  No cough  No chest pain  She has never had pneumonia  She is a lifelong nonsmoker  She does not use any vaping products or marijuana  She has had secondhand smoke exposure when she was a child  Her father was a heavy smoker who probably had COPD  She has never had asthma or lung conditions however  She denies occupational risk factors  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 but never had any reaction to this  She has no personal history of malignancy    She did have a breast lumpectomy for a benign lump  Colonoscopy is up-to-date  She does have osteoporosis  She has also had esophageal reflux and history of gastric ulcer in the past   Currently well controlled symptoms  Review of Systems:  Review of Systems   Constitutional: Negative for activity change, appetite change, chills, fatigue, fever and unexpected weight change  HENT: Negative  Respiratory: Negative  Cardiovascular: Negative  Gastrointestinal:        As per HPI   Musculoskeletal: Negative for back pain  Allergic/Immunologic: Negative for environmental allergies  Neurological: Negative for dizziness and headaches  Psychiatric/Behavioral: Negative  All other systems reviewed and are negative        Current Medications:    Current Outpatient Medications:     b complex vitamins tablet, Take 1 tablet by mouth daily, Disp: , Rfl:     calcium carbonate-vitamin D (OSCAL-D) 500 mg-200 units per tablet, Take 1 tablet by mouth daily with breakfast, Disp: , Rfl:     clindamycin (CLEOCIN) 150 mg capsule, , Disp: , Rfl:     omeprazole (PriLOSEC) 20 mg delayed release capsule, Take 1 capsule (20 mg total) by mouth daily, Disp: 90 capsule, Rfl: 1    vitamin E 100 UNIT capsule, Take 100 Units by mouth daily, Disp: , Rfl:     Bone Meal-Vitamin D (BONE MEAL PO), Take by mouth, Disp: , Rfl:     calcium carbonate (OS-ROSA) 600 MG tablet, Take 1 tablet (600 mg total) by mouth daily (Patient not taking: Reported on 1/15/2020), Disp: 90 tablet, Rfl: 5    Collagen Hydrolysate POWD, by Does not apply route, Disp: , Rfl:     D-Mannose POWD, Take by mouth, Disp: , Rfl:     LYSINE PO, Take by mouth, Disp: , Rfl:     omega-3-acid ethyl esters (LOVAZA) 1 g capsule, Take 2 g by mouth 2 (two) times a day, Disp: , Rfl:     Historical Information   Past Medical History:   Diagnosis Date    Age-related osteoporosis without current pathological fracture 7/25/2018    Class 1 obesity in adult 3/26/2018    Gastroesophageal reflux disease without esophagitis 3/26/2018    Hernia, hiatal     Insomnia     Osteoporosis     Recurrent UTI 5/3/2018    Stroke (HCC)     Ulcer, gastric, acute     Vitamin D deficiency      Past Surgical History:   Procedure Laterality Date    BREAST LUMPECTOMY Left 1997    benign    HERNIA REPAIR      umbilical hernia     TUBAL LIGATION       Social History   Social History     Tobacco Use   Smoking Status Never Smoker   Smokeless Tobacco Never Used   Tobacco Comment    exposed to heavy smoke and chemicals       Occupational history:  She is retired  She is an immigrant from the [facility]  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 [place]  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      Family History:   Family History   Problem Relation Age of Onset    Diabetes Mother     Heart disease Father     COPD Father     Prostate cancer Brother          PhysicalExamination:  Vitals:   /70   Pulse 68   Temp 98 8 °F (37 1 °C)   Resp 16   Ht 4' 7" (1 397 m)   Wt 63 kg (139 lb)   SpO2 98%   BMI 32 31 kg/m²   Gen:  Comfortable on room air and without respiratory distress  HEENT: PERRL  Oropharynx is clear without any erythema or exudate  Neck: Supple  There is no JVD, lymphadenopathy or thyromegaly appreciated  Trachea is midline  Chest: Symmetric chest wall excursion  Lung fields are clear to auscultation  No wheezes, rales or rhonchi  Normal resonance to percussion  Cardiac: Regular rate and rhythm  There are no murmurs  Abdomen: Soft and nontender  Benign  Extremities: No clubbing, cyanosis or edema  Neurologic: No focal deficits  Skin: No appreciable rashes  Diagnostic Data:  Labs:   I personally reviewed the most recent laboratory data pertinent to today's visit    Lab Results   Component Value Date    WBC 5 03 02/27/2018    HGB 13 4 02/27/2018    HCT 39 0 02/27/2018    MCV 82 02/27/2018     02/27/2018 Lab Results   Component Value Date    CALCIUM 9 7 12/18/2019    K 4 2 12/18/2019    CO2 29 12/18/2019     12/18/2019    BUN 12 12/18/2019    CREATININE 0 75 12/18/2019     No results found for: IGE  Lab Results   Component Value Date    ALT 23 12/18/2019    AST 20 12/18/2019    ALKPHOS 76 12/18/2019       Imaging:  I personally reviewed the images on the [facility] system pertinent to today's visit  Personally reviewed the imaging studies from February 2018 through the most current study January 2020  She does not have any evidence of emphysema or structural lung disease    The tiny pulmonary nodules are 5 mm or less in size       Cora Mock MD

## 2020-01-30 NOTE — ASSESSMENT & PLAN NOTE
· Patient has 3 small lung nodules, 5 mm or less in size  These has been imaged over 18 months, first noted on abdominal CT scan will in February 2018  · Patient has no pulmonary malignancy risk factors  · Based on the size of these nodules and current Fleischner Society guidelines, no further follow-up is recommended  · The recommendation for a 24 months study would be optional in a high risk individual for nodules of this size  · I have therefore not recommended any further CT imaging    These nodules may be considered benign based on radiographic characteristics

## 2020-02-20 ENCOUNTER — OFFICE VISIT (OUTPATIENT)
Dept: FAMILY MEDICINE CLINIC | Facility: CLINIC | Age: 70
End: 2020-02-20
Payer: COMMERCIAL

## 2020-02-20 VITALS
BODY MASS INDEX: 32.4 KG/M2 | HEART RATE: 76 BPM | HEIGHT: 55 IN | WEIGHT: 140 LBS | SYSTOLIC BLOOD PRESSURE: 118 MMHG | TEMPERATURE: 97.8 F | DIASTOLIC BLOOD PRESSURE: 80 MMHG | RESPIRATION RATE: 18 BRPM

## 2020-02-20 DIAGNOSIS — N30.00 ACUTE CYSTITIS WITHOUT HEMATURIA: Primary | ICD-10-CM

## 2020-02-20 LAB
SL AMB  POCT GLUCOSE, UA: NORMAL
SL AMB LEUKOCYTE ESTERASE,UA: NORMAL
SL AMB POCT BILIRUBIN,UA: NORMAL
SL AMB POCT BLOOD,UA: NORMAL
SL AMB POCT CLARITY,UA: NORMAL
SL AMB POCT COLOR,UA: NORMAL
SL AMB POCT KETONES,UA: NORMAL
SL AMB POCT NITRITE,UA: NORMAL
SL AMB POCT PH,UA: 6
SL AMB POCT SPECIFIC GRAVITY,UA: 1.01
SL AMB POCT URINE PROTEIN: 100
SL AMB POCT UROBILINOGEN: 0.2

## 2020-02-20 PROCEDURE — 4040F PNEUMOC VAC/ADMIN/RCVD: CPT | Performed by: NURSE PRACTITIONER

## 2020-02-20 PROCEDURE — 99213 OFFICE O/P EST LOW 20 MIN: CPT | Performed by: NURSE PRACTITIONER

## 2020-02-20 PROCEDURE — 81002 URINALYSIS NONAUTO W/O SCOPE: CPT | Performed by: NURSE PRACTITIONER

## 2020-02-20 PROCEDURE — 87147 CULTURE TYPE IMMUNOLOGIC: CPT | Performed by: NURSE PRACTITIONER

## 2020-02-20 PROCEDURE — 87086 URINE CULTURE/COLONY COUNT: CPT | Performed by: NURSE PRACTITIONER

## 2020-02-20 PROCEDURE — 1036F TOBACCO NON-USER: CPT | Performed by: NURSE PRACTITIONER

## 2020-02-20 PROCEDURE — 3008F BODY MASS INDEX DOCD: CPT | Performed by: NURSE PRACTITIONER

## 2020-02-20 PROCEDURE — 1160F RVW MEDS BY RX/DR IN RCRD: CPT | Performed by: NURSE PRACTITIONER

## 2020-02-20 RX ORDER — CEPHALEXIN 500 MG/1
500 CAPSULE ORAL EVERY 12 HOURS SCHEDULED
Qty: 14 CAPSULE | Refills: 0 | Status: SHIPPED | OUTPATIENT
Start: 2020-02-20 | End: 2020-02-27 | Stop reason: ALTCHOICE

## 2020-02-20 NOTE — PROGRESS NOTES
Assessment and Plan:    Problem List Items Addressed This Visit     None      Visit Diagnoses     Acute cystitis without hematuria    -  Primary    Relevant Medications    cephalexin (KEFLEX) 500 mg capsule    Other Relevant Orders    POCT urine dip (Completed)                 Diagnoses and all orders for this visit:    Acute cystitis without hematuria  -     POCT urine dip  -     cephalexin (KEFLEX) 500 mg capsule; Take 1 capsule (500 mg total) by mouth every 12 (twelve) hours for 7 days              Subjective:      Patient ID: Gerard Powers is a 77 y o  female  CC:    Chief Complaint   Patient presents with    Urinary Tract Infection     burning, stabbing pain, frequency  Starting since Saturday  Pt tried Azo  HPI:    Urinary Tract Infection    This is a new problem  Episode onset: 5 days  The problem occurs every urination  The problem has been gradually worsening  The quality of the pain is described as burning, aching and shooting (itching )  The pain is at a severity of 5/10  The pain is moderate  There has been no fever  She is not sexually active  There is no history of pyelonephritis  Associated symptoms include chills, frequency, nausea, sweats and urgency  Pertinent negatives include no discharge, flank pain, hematuria, hesitancy or vomiting  Treatments tried: azo  The treatment provided no relief  The following portions of the patient's history were reviewed and updated as appropriate: allergies, current medications, past family history, past medical history, past social history, past surgical history and problem list       Review of Systems   Constitutional: Positive for chills and fatigue  Respiratory: Negative  Negative for chest tightness and shortness of breath  Cardiovascular: Negative  Negative for chest pain and palpitations  Gastrointestinal: Positive for nausea  Negative for diarrhea and vomiting     Genitourinary: Positive for difficulty urinating, dysuria, frequency and urgency  Negative for flank pain, hematuria, hesitancy, pelvic pain and vaginal discharge  Neurological: Positive for headaches  Data to review:       Objective:    Vitals:    02/20/20 1330   BP: 118/80   BP Location: Left arm   Patient Position: Sitting   Cuff Size: Adult   Pulse: 76   Resp: 18   Temp: 97 8 °F (36 6 °C)   TempSrc: Temporal   Weight: 63 5 kg (140 lb)   Height: 4' 6 1" (1 374 m)        Physical Exam   Constitutional: She is oriented to person, place, and time  She appears well-developed and well-nourished  HENT:   Head: Normocephalic and atraumatic  Cardiovascular: Normal rate, regular rhythm and normal heart sounds  No murmur heard  Pulmonary/Chest: Effort normal and breath sounds normal  No respiratory distress  Abdominal: Soft  Bowel sounds are normal  There is tenderness in the suprapubic area  There is no CVA tenderness  Neurological: She is alert and oriented to person, place, and time  Skin: Skin is warm, dry and intact  She is not diaphoretic  Psychiatric: She has a normal mood and affect  Thought content normal    Nursing note and vitals reviewed

## 2020-02-21 LAB — BACTERIA UR CULT: ABNORMAL

## 2020-03-20 ENCOUNTER — TELEPHONE (OUTPATIENT)
Dept: FAMILY MEDICINE CLINIC | Facility: CLINIC | Age: 70
End: 2020-03-20

## 2020-03-21 ENCOUNTER — NURSE TRIAGE (OUTPATIENT)
Dept: OTHER | Facility: OTHER | Age: 70
End: 2020-03-21

## 2020-03-21 ENCOUNTER — TELEPHONE (OUTPATIENT)
Dept: FAMILY MEDICINE CLINIC | Facility: CLINIC | Age: 70
End: 2020-03-21

## 2020-03-21 DIAGNOSIS — N39.0 RECURRENT UTI: Primary | ICD-10-CM

## 2020-03-21 RX ORDER — CEPHALEXIN 500 MG/1
500 CAPSULE ORAL EVERY 12 HOURS SCHEDULED
Qty: 14 CAPSULE | Refills: 0 | Status: SHIPPED | OUTPATIENT
Start: 2020-03-21 | End: 2020-07-22 | Stop reason: SDUPTHER

## 2020-03-21 NOTE — TELEPHONE ENCOUNTER
Reason for Disposition   Urinating more frequently than usual (i e , frequency)    Answer Assessment - Initial Assessment Questions  1  SYMPTOM: "What's the main symptom you're concerned about?" (e g , frequency, incontinence)      Patient states has urinary frequency and urgency, feels uncomfortable, states urine is "bubbly"  Denies hematuria, and foul odor  2  ONSET: "When did the  symptoms  start?"      Thursday night    3  PAIN: "Is there any pain?" If so, ask: "How bad is it?" (Scale: 1-10; mild, moderate, severe)      5/10     4  CAUSE: "What do you think is causing the symptoms?"      Possible UTI    5  OTHER SYMPTOMS: "Do you have any other symptoms?" (e g , fever, flank pain, blood in urine, pain with urination)      Denies  States has been having chills      Protocols used: [facility]

## 2020-03-21 NOTE — TELEPHONE ENCOUNTER
Pt called in because she has another UTI and will like for Brandi to call in cephalexin 500 mg to [facility] [address] ,   Thank you

## 2020-03-21 NOTE — TELEPHONE ENCOUNTER
Regarding: UTI - called office 2x for Rx   ----- Message from Whit Murillo sent at 3/21/2020  1:29 PM EDT -----  "I called the office yesterday and today regarding getting a Rx for Cephalexin 500 mg to treat her UTI   Nothing was sent in to pharmacy [address] [place] [address] "

## 2020-03-21 NOTE — TELEPHONE ENCOUNTER
Regarding: UTI - called office 2x for Rx   ----- Message from Maryanne Mohs sent at 3/21/2020  6:01 PM EDT -----  Daughter called back   Gave her phone number so the nurse can call back please

## 2020-03-21 NOTE — TELEPHONE ENCOUNTER
Spoke with brandon cole and discussed symptoms   Stated rx cephalexin 500mg bid x7 day no refills can be called into [facility] pharmacy

## 2020-03-23 NOTE — TELEPHONE ENCOUNTER
Duplicate task this was addressed yesterday with on call nurse 
Pt called c/o cloudy urine, urinary frequency, chills, and unable to empty all bladder  Pt had a recent UTI and was requesting another antibiotic, states it helped in the past  Please advise  Pt [address] 
No

## 2020-03-27 ENCOUNTER — TELEPHONE (OUTPATIENT)
Dept: GASTROENTEROLOGY | Facility: MEDICAL CENTER | Age: 70
End: 2020-03-27

## 2020-05-19 DIAGNOSIS — K21.9 GASTROESOPHAGEAL REFLUX DISEASE WITHOUT ESOPHAGITIS: ICD-10-CM

## 2020-05-19 RX ORDER — OMEPRAZOLE 20 MG/1
20 CAPSULE, DELAYED RELEASE ORAL DAILY
Qty: 90 CAPSULE | Refills: 1 | Status: SHIPPED | OUTPATIENT
Start: 2020-05-19 | End: 2020-08-27 | Stop reason: SDUPTHER

## 2020-06-28 ENCOUNTER — TELEPHONE (OUTPATIENT)
Dept: OTHER | Facility: OTHER | Age: 70
End: 2020-06-28

## 2020-07-21 ENCOUNTER — TELEPHONE (OUTPATIENT)
Dept: FAMILY MEDICINE CLINIC | Facility: CLINIC | Age: 70
End: 2020-07-21

## 2020-07-21 NOTE — TELEPHONE ENCOUNTER
Patient thinks she has a UTI could something be called into [facility] for this   I think the medicine she was on before was cleocin please call her at [phone]

## 2020-07-22 ENCOUNTER — OFFICE VISIT (OUTPATIENT)
Dept: FAMILY MEDICINE CLINIC | Facility: CLINIC | Age: 70
End: 2020-07-22
Payer: COMMERCIAL

## 2020-07-22 VITALS
SYSTOLIC BLOOD PRESSURE: 110 MMHG | BODY MASS INDEX: 33.33 KG/M2 | RESPIRATION RATE: 18 BRPM | TEMPERATURE: 98.6 F | HEIGHT: 55 IN | HEART RATE: 78 BPM | WEIGHT: 144 LBS | DIASTOLIC BLOOD PRESSURE: 78 MMHG

## 2020-07-22 DIAGNOSIS — R91.1 INCIDENTAL LUNG NODULE, > 3MM AND < 8MM: ICD-10-CM

## 2020-07-22 DIAGNOSIS — N39.0 RECURRENT UTI: ICD-10-CM

## 2020-07-22 DIAGNOSIS — N39.0 URINARY TRACT INFECTION WITH HEMATURIA, SITE UNSPECIFIED: Primary | ICD-10-CM

## 2020-07-22 DIAGNOSIS — R31.9 URINARY TRACT INFECTION WITH HEMATURIA, SITE UNSPECIFIED: Primary | ICD-10-CM

## 2020-07-22 DIAGNOSIS — M81.0 AGE-RELATED OSTEOPOROSIS WITHOUT CURRENT PATHOLOGICAL FRACTURE: ICD-10-CM

## 2020-07-22 LAB
SL AMB  POCT GLUCOSE, UA: NEGATIVE
SL AMB LEUKOCYTE ESTERASE,UA: ABNORMAL
SL AMB POCT BILIRUBIN,UA: NEGATIVE
SL AMB POCT BLOOD,UA: ABNORMAL
SL AMB POCT CLARITY,UA: ABNORMAL
SL AMB POCT COLOR,UA: YELLOW
SL AMB POCT KETONES,UA: NEGATIVE
SL AMB POCT NITRITE,UA: NEGATIVE
SL AMB POCT PH,UA: 5.5
SL AMB POCT SPECIFIC GRAVITY,UA: 1.01
SL AMB POCT URINE PROTEIN: ABNORMAL
SL AMB POCT UROBILINOGEN: 0.2

## 2020-07-22 PROCEDURE — 3008F BODY MASS INDEX DOCD: CPT | Performed by: NURSE PRACTITIONER

## 2020-07-22 PROCEDURE — 1160F RVW MEDS BY RX/DR IN RCRD: CPT | Performed by: NURSE PRACTITIONER

## 2020-07-22 PROCEDURE — 87147 CULTURE TYPE IMMUNOLOGIC: CPT | Performed by: NURSE PRACTITIONER

## 2020-07-22 PROCEDURE — 81002 URINALYSIS NONAUTO W/O SCOPE: CPT | Performed by: NURSE PRACTITIONER

## 2020-07-22 PROCEDURE — 99214 OFFICE O/P EST MOD 30 MIN: CPT | Performed by: NURSE PRACTITIONER

## 2020-07-22 PROCEDURE — 4040F PNEUMOC VAC/ADMIN/RCVD: CPT | Performed by: NURSE PRACTITIONER

## 2020-07-22 PROCEDURE — 1036F TOBACCO NON-USER: CPT | Performed by: NURSE PRACTITIONER

## 2020-07-22 PROCEDURE — 87086 URINE CULTURE/COLONY COUNT: CPT | Performed by: NURSE PRACTITIONER

## 2020-07-22 RX ORDER — CEPHALEXIN 500 MG/1
500 CAPSULE ORAL EVERY 12 HOURS SCHEDULED
Qty: 14 CAPSULE | Refills: 0 | Status: SHIPPED | OUTPATIENT
Start: 2020-07-22 | End: 2020-07-29 | Stop reason: ALTCHOICE

## 2020-07-22 NOTE — PROGRESS NOTES
Assessment and Plan:    Problem List Items Addressed This Visit        Musculoskeletal and Integument    Age-related osteoporosis without current pathological fracture    Relevant Orders    DXA bone density spine hip and pelvis    Ambulatory referral to Rheumatology       Genitourinary    Recurrent UTI     Patient with recurrent UTI  Last infection was in February  Patient urinalysis shows abnormal leuks  Will start on keflex  Will send urine for culture  Relevant Medications    cephalexin (KEFLEX) 500 mg capsule    Other Relevant Orders    Ambulatory referral to Urology       Other    Incidental lung nodule, > 3mm and < 8mm     Patient does not need any additional follow up on her lung nodules due to low risk of malignancy  If she were to become symptomatic would reconsider  Other Visit Diagnoses     Urinary tract infection with hematuria, site unspecified    -  Primary    Relevant Medications    cephalexin (KEFLEX) 500 mg capsule    Other Relevant Orders    POCT urine dip (Completed)                 Diagnoses and all orders for this visit:    Urinary tract infection with hematuria, site unspecified  -     POCT urine dip    Recurrent UTI  -     cephalexin (KEFLEX) 500 mg capsule; Take 1 capsule (500 mg total) by mouth every 12 (twelve) hours for 7 days  -     Ambulatory referral to Urology; Future    Age-related osteoporosis without current pathological fracture  -     DXA bone density spine hip and pelvis; Future  -     Ambulatory referral to Rheumatology; Future    Incidental lung nodule, > 3mm and < 8mm              Subjective:      Patient ID: Reba Simmons is a 77 y o  female  CC:    Chief Complaint   Patient presents with    Possible UTI       HPI:    Patient states that suddenly on sunday night she had severe burning with urination; she also had fever and chills Sunday night  She started taking medications at home  Monday she was ok but then felt worse   She states her daughter went to pharmacy and bought azo  Since taking 1 tabs last night her symptoms improved significantly  Patient also wants to know when she is due for her bone density test  She was going for infusions and not sure when she is to follow up  She was being seen at [facility] with Dr Carmelo Shaw and she was not happy with her care  Patient also wants to know If she needs to follow up on her lung nodules  The following portions of the patient's history were reviewed and updated as appropriate: allergies, current medications, past family history, past medical history, past social history, past surgical history and problem list       Review of Systems   Constitutional: Positive for chills and fever  Negative for fatigue  Respiratory: Negative  Cardiovascular: Negative  Gastrointestinal: Negative  Genitourinary: Positive for dysuria and urgency  Musculoskeletal: Negative for back pain  Neurological: Negative  Negative for dizziness, light-headedness and headaches  Data to review:       Objective:    Vitals:    07/22/20 1512   BP: 110/78   BP Location: Left arm   Patient Position: Sitting   Cuff Size: Standard   Pulse: 78   Resp: 18   Temp: 98 6 °F (37 °C)   TempSrc: Temporal   Weight: 65 3 kg (144 lb)   Height: 4' 6 1" (1 374 m)        Physical Exam   Constitutional: She is oriented to person, place, and time  Vital signs are normal  She appears well-developed and well-nourished  She does not appear ill  HENT:   Head: Normocephalic and atraumatic  Cardiovascular: Normal rate, regular rhythm, S1 normal, S2 normal and normal heart sounds  No murmur heard  Pulmonary/Chest: Effort normal and breath sounds normal  No respiratory distress  Abdominal: Soft  Bowel sounds are normal  She exhibits no distension  There is tenderness in the suprapubic area  There is no CVA tenderness  Neurological: She is alert and oriented to person, place, and time  Psychiatric: She has a normal mood and affect   Thought content normal

## 2020-07-22 NOTE — PATIENT INSTRUCTIONS
Problem List Items Addressed This Visit        Musculoskeletal and Integument    Age-related osteoporosis without current pathological fracture    Relevant Orders    DXA bone density spine hip and pelvis       Genitourinary    Recurrent UTI     Patient with recurrent UTI  Last infection was in February  Patient urinalysis shows abnormal leuks  Will start on keflex  Will send urine for culture  Relevant Medications    cephalexin (KEFLEX) 500 mg capsule       Other    Incidental lung nodule, > 3mm and < 8mm     Patient does not need any additional follow up on her lung nodules due to low risk of malignancy  If she were to become symptomatic would reconsider              Other Visit Diagnoses     Urinary tract infection with hematuria, site unspecified    -  Primary    Relevant Medications    cephalexin (KEFLEX) 500 mg capsule    Other Relevant Orders    POCT urine dip (Completed)

## 2020-07-22 NOTE — ASSESSMENT & PLAN NOTE
Patient does not need any additional follow up on her lung nodules due to low risk of malignancy  If she were to become symptomatic would reconsider

## 2020-07-22 NOTE — ASSESSMENT & PLAN NOTE
Patient with recurrent UTI  Last infection was in February  Patient urinalysis shows abnormal leuks  Will start on keflex  Will send urine for culture

## 2020-07-23 LAB — BACTERIA UR CULT: ABNORMAL

## 2020-08-26 ENCOUNTER — NURSE TRIAGE (OUTPATIENT)
Dept: OTHER | Facility: OTHER | Age: 70
End: 2020-08-26

## 2020-08-27 ENCOUNTER — OFFICE VISIT (OUTPATIENT)
Dept: FAMILY MEDICINE CLINIC | Facility: CLINIC | Age: 70
End: 2020-08-27
Payer: COMMERCIAL

## 2020-08-27 VITALS
HEART RATE: 86 BPM | SYSTOLIC BLOOD PRESSURE: 130 MMHG | BODY MASS INDEX: 33.14 KG/M2 | TEMPERATURE: 98.4 F | HEIGHT: 55 IN | WEIGHT: 143.2 LBS | DIASTOLIC BLOOD PRESSURE: 80 MMHG

## 2020-08-27 DIAGNOSIS — K21.9 GASTROESOPHAGEAL REFLUX DISEASE WITHOUT ESOPHAGITIS: ICD-10-CM

## 2020-08-27 DIAGNOSIS — R82.2 BILIRUBIN IN URINE: ICD-10-CM

## 2020-08-27 DIAGNOSIS — E55.9 VITAMIN D DEFICIENCY: ICD-10-CM

## 2020-08-27 DIAGNOSIS — M81.0 AGE-RELATED OSTEOPOROSIS WITHOUT CURRENT PATHOLOGICAL FRACTURE: ICD-10-CM

## 2020-08-27 DIAGNOSIS — Z13.220 ENCOUNTER FOR LIPID SCREENING FOR CARDIOVASCULAR DISEASE: ICD-10-CM

## 2020-08-27 DIAGNOSIS — Z13.6 ENCOUNTER FOR LIPID SCREENING FOR CARDIOVASCULAR DISEASE: ICD-10-CM

## 2020-08-27 DIAGNOSIS — R80.9 PROTEINURIA, UNSPECIFIED TYPE: ICD-10-CM

## 2020-08-27 DIAGNOSIS — N39.0 RECURRENT UTI: Primary | ICD-10-CM

## 2020-08-27 LAB
SL AMB  POCT GLUCOSE, UA: 100
SL AMB LEUKOCYTE ESTERASE,UA: ABNORMAL
SL AMB POCT BILIRUBIN,UA: ABNORMAL
SL AMB POCT BLOOD,UA: ABNORMAL
SL AMB POCT CLARITY,UA: ABNORMAL
SL AMB POCT COLOR,UA: ABNORMAL
SL AMB POCT KETONES,UA: ABNORMAL
SL AMB POCT NITRITE,UA: POSITIVE
SL AMB POCT PH,UA: 5
SL AMB POCT SPECIFIC GRAVITY,UA: 1.01
SL AMB POCT URINE PROTEIN: 300
SL AMB POCT UROBILINOGEN: 4

## 2020-08-27 PROCEDURE — 99214 OFFICE O/P EST MOD 30 MIN: CPT | Performed by: NURSE PRACTITIONER

## 2020-08-27 PROCEDURE — 87147 CULTURE TYPE IMMUNOLOGIC: CPT | Performed by: NURSE PRACTITIONER

## 2020-08-27 PROCEDURE — 4040F PNEUMOC VAC/ADMIN/RCVD: CPT | Performed by: NURSE PRACTITIONER

## 2020-08-27 PROCEDURE — 3008F BODY MASS INDEX DOCD: CPT | Performed by: NURSE PRACTITIONER

## 2020-08-27 PROCEDURE — 1036F TOBACCO NON-USER: CPT | Performed by: NURSE PRACTITIONER

## 2020-08-27 PROCEDURE — 81002 URINALYSIS NONAUTO W/O SCOPE: CPT | Performed by: NURSE PRACTITIONER

## 2020-08-27 PROCEDURE — 87086 URINE CULTURE/COLONY COUNT: CPT | Performed by: NURSE PRACTITIONER

## 2020-08-27 PROCEDURE — 1160F RVW MEDS BY RX/DR IN RCRD: CPT | Performed by: NURSE PRACTITIONER

## 2020-08-27 RX ORDER — OMEPRAZOLE 20 MG/1
20 CAPSULE, DELAYED RELEASE ORAL DAILY
Qty: 90 CAPSULE | Refills: 1 | Status: SHIPPED | OUTPATIENT
Start: 2020-08-27 | End: 2020-12-15 | Stop reason: SDUPTHER

## 2020-08-27 RX ORDER — CEFUROXIME AXETIL 500 MG/1
250 TABLET ORAL EVERY 12 HOURS SCHEDULED
Qty: 10 TABLET | Refills: 0 | Status: SHIPPED | OUTPATIENT
Start: 2020-08-27 | End: 2020-09-06

## 2020-08-27 NOTE — ASSESSMENT & PLAN NOTE
Patient last vitamin d was low  We do need to recheck this  She is on vitamin D  She is getting reclast infusion  Her dexa is overdue patient advised to call central scheduling to make an appointment

## 2020-08-27 NOTE — TELEPHONE ENCOUNTER
Reason for Disposition   > 2 UTI's in last year    Answer Assessment - Initial Assessment Questions  1  SEVERITY: "How bad is the pain?"  (e g , Scale 1-10; mild, moderate, or severe)    - MILD (1-3): complains slightly about urination hurting    - MODERATE (4-7): interferes with normal activities      - SEVERE (8-10): excruciating, unwilling or unable to urinate because of the pain        2  FREQUENCY: "How many times have you had painful urination today?"       Increased frequency and urgency  3  PATTERN: "Is pain present every time you urinate or just sometimes?"       everytime  4  ONSET: "When did the painful urination start?"       Monday start  5  FEVER: "Do you have a fever?" If so, ask: "What is your temperature, how was it measured, and when did it start?"      No  6  PAST UTI: "Have you had a urine infection before?" If so, ask: "When was the last time?" and "What happened that time?"       Yes   7  CAUSE: "What do you think is causing the painful urination?"  (e g , UTI, scratch, Herpes sore)      *No Answer*  8  OTHER SYMPTOMS: "Do you have any other symptoms?" (e g , flank pain, vaginal discharge, genital sores, urgency, blood in urine)      *No Answer*  9   PREGNANCY: "Is there any chance you are pregnant?" "When was your last menstrual period?"      *No Answer*    Protocols used: URINATION PAIN [facility]

## 2020-08-27 NOTE — PROGRESS NOTES
Assessment and Plan:    Problem List Items Addressed This Visit        Digestive    Gastroesophageal reflux disease without esophagitis     Patient states she does have to follow up with GI her appointment was cancelled due to covid  She is on omeprazole 20mg  Refilled today  Relevant Medications    omeprazole (PriLOSEC) 20 mg delayed release capsule       Musculoskeletal and Integument    Age-related osteoporosis without current pathological fracture     Patient last vitamin d was low  We do need to recheck this  She is on vitamin D  She is getting reclast infusion  Her dexa is overdue patient advised to call central scheduling to make an appointment  Relevant Orders    TSH, 3rd generation with Free T4 reflex (Completed)       Genitourinary    Recurrent UTI - Primary     Patient with significant UTI prevalence  Patient has appointment with urology on October 1  In office urinalysis was very abnormal  She was last treated with keflex  Patient did have US of kidney and bladder 8/2019 which everything was normal  I will repeat US  Urine sent for culture  Since gross abnormlaities in her urine will treat with          Relevant Medications    cefuroxime (CEFTIN) 500 mg tablet    Other Relevant Orders    POCT urine dip (Completed)    Urine culture (Completed)    Comprehensive metabolic panel (Completed)    US kidney and bladder    Urinalysis with microscopic       Other    Vitamin D deficiency     Low vitamin d  Is on supplement  Will recheck          Relevant Orders    Vitamin D 25 hydroxy    Bilirubin in urine     Possible false elevation with consumption of AZO  Will check LFT and hepatitis panel  Relevant Orders    Comprehensive metabolic panel (Completed)    Hepatitis panel, acute    CBC and differential (Completed)    Urinalysis with microscopic    Proteinuria     Significant protein in urine today  Will obtain renal function  US kidney  Will recheck urine protein after resolution of UTI  Relevant Orders    Comprehensive metabolic panel (Completed)    US kidney and bladder    Hepatitis panel, acute    CBC and differential (Completed)    Urinalysis with microscopic      Other Visit Diagnoses     Encounter for lipid screening for cardiovascular disease        Relevant Orders    TSH, 3rd generation with Free T4 reflex (Completed)    Lipid panel (Completed)                 Diagnoses and all orders for this visit:    Recurrent UTI  -     POCT urine dip  -     Urine culture; Future  -     Urine culture  -     Comprehensive metabolic panel; Future  -     US kidney and bladder; Future  -     cefuroxime (CEFTIN) 500 mg tablet; Take 0 5 tablets (250 mg total) by mouth every 12 (twelve) hours for 10 days  -     Urinalysis with microscopic; Future    Age-related osteoporosis without current pathological fracture  -     TSH, 3rd generation with Free T4 reflex; Future    Vitamin D deficiency  -     Vitamin D 25 hydroxy; Future    Bilirubin in urine  -     Comprehensive metabolic panel; Future  -     Hepatitis panel, acute; Future  -     CBC and differential; Future  -     Urinalysis with microscopic; Future    Proteinuria, unspecified type  -     Comprehensive metabolic panel; Future  -     US kidney and bladder; Future  -     Hepatitis panel, acute; Future  -     CBC and differential; Future  -     Urinalysis with microscopic; Future    Gastroesophageal reflux disease without esophagitis  -     omeprazole (PriLOSEC) 20 mg delayed release capsule; Take 1 capsule (20 mg total) by mouth daily    Encounter for lipid screening for cardiovascular disease  -     TSH, 3rd generation with Free T4 reflex; Future  -     Lipid panel              Subjective:      Patient ID: Berenice Mercado is a 72 y o  female  CC:    Chief Complaint   Patient presents with    Possible UTI     Pt is here for possible uti  pt c/o pressure, states she have pain but that now has gotten better   Pt also c/o itchiness Pt states she did use OTC AZO  HPI:    Patient presents today for recurrence of UTI symptoms  Patient states that her symptoms started on Monday and progressively became worse  Patient states that over the last several months she has not mention this to anyone but she often feels a bulge coming from her vagina or anus area and she thinks that this may be contaminating her urinary tract  She does not mention any stool coming from the  He states that she will just push it back up  Patient states this only occurred a few times  Patient does have an appointment with urology in October  She did have a kidney and bladder ultrasound which was completed August of 2019 and it was normal   Patient states that she did take an azo on Monday when she started having symptoms  The following portions of the patient's history were reviewed and updated as appropriate: allergies, current medications, past family history, past medical history, past social history, past surgical history and problem list       Review of Systems   Constitutional: Negative for chills, diaphoresis, fatigue and fever  Gastrointestinal: Positive for nausea  Negative for vomiting  Genitourinary: Positive for difficulty urinating, dysuria, frequency and urgency  Negative for flank pain, hematuria, pelvic pain and vaginal discharge  Data to review:       Objective:    Vitals:    08/27/20 0940   BP: 130/80   BP Location: Left arm   Patient Position: Sitting   Cuff Size: Adult   Pulse: 86   Temp: 98 4 °F (36 9 °C)   TempSrc: Tympanic   Weight: 65 kg (143 lb 3 2 oz)   Height: 4' 6 1" (1 374 m)        Physical Exam  Vitals signs and nursing note reviewed  Constitutional:       General: She is not in acute distress  Appearance: Normal appearance  She is not ill-appearing or diaphoretic  HENT:      Head: Normocephalic and atraumatic        Right Ear: External ear normal       Left Ear: External ear normal    Eyes:      Extraocular Movements: Extraocular movements intact  Conjunctiva/sclera: Conjunctivae normal    Cardiovascular:      Rate and Rhythm: Normal rate and regular rhythm  Heart sounds: Normal heart sounds, S1 normal and S2 normal  No murmur  Pulmonary:      Effort: Pulmonary effort is normal       Breath sounds: Normal breath sounds  Abdominal:      General: Bowel sounds are normal       Palpations: Abdomen is soft  Tenderness: There is no right CVA tenderness or left CVA tenderness  Genitourinary:     Urethra: No prolapse  Comments: There is no uterine or bladder prolapse visualized on exam   Patient does have a very tiny hemorrhoid which is not inflamed  No evidence of rectal prolapse as well  Skin:     Coloration: Skin is not pale  Neurological:      Mental Status: She is alert and oriented to person, place, and time  Psychiatric:         Mood and Affect: Mood normal          Behavior: Behavior normal          Thought Content:  Thought content normal          Judgment: Judgment normal

## 2020-08-27 NOTE — ASSESSMENT & PLAN NOTE
Significant protein in urine today  Will obtain renal function  US kidney  Will recheck urine protein after resolution of UTI

## 2020-08-27 NOTE — ASSESSMENT & PLAN NOTE
Patient with significant UTI prevalence  Patient has appointment with urology on October 1  In office urinalysis was very abnormal  She was last treated with keflex  Patient did have US of kidney and bladder 8/2019 which everything was normal  I will repeat US  Urine sent for culture   Since gross abnormlaities in her urine will treat with

## 2020-08-27 NOTE — ASSESSMENT & PLAN NOTE
Patient states she does have to follow up with GI her appointment was cancelled due to covid  She is on omeprazole 20mg  Refilled today

## 2020-08-27 NOTE — TELEPHONE ENCOUNTER
Regarding: UTI  ----- Message from [facility] sent at 8/26/2020  9:20 PM EDT -----  "My mother seems to have a UTI "

## 2020-08-28 ENCOUNTER — APPOINTMENT (OUTPATIENT)
Dept: LAB | Facility: HOSPITAL | Age: 70
End: 2020-08-28
Payer: COMMERCIAL

## 2020-08-28 DIAGNOSIS — N39.0 RECURRENT UTI: ICD-10-CM

## 2020-08-28 DIAGNOSIS — Z13.6 ENCOUNTER FOR LIPID SCREENING FOR CARDIOVASCULAR DISEASE: ICD-10-CM

## 2020-08-28 DIAGNOSIS — R80.9 PROTEINURIA, UNSPECIFIED TYPE: ICD-10-CM

## 2020-08-28 DIAGNOSIS — E55.9 VITAMIN D DEFICIENCY: ICD-10-CM

## 2020-08-28 DIAGNOSIS — Z13.220 ENCOUNTER FOR LIPID SCREENING FOR CARDIOVASCULAR DISEASE: ICD-10-CM

## 2020-08-28 DIAGNOSIS — R82.2 BILIRUBIN IN URINE: ICD-10-CM

## 2020-08-28 DIAGNOSIS — M81.0 AGE-RELATED OSTEOPOROSIS WITHOUT CURRENT PATHOLOGICAL FRACTURE: ICD-10-CM

## 2020-08-28 LAB
25(OH)D3 SERPL-MCNC: 29.4 NG/ML (ref 30–100)
ALBUMIN SERPL BCP-MCNC: 3.9 G/DL (ref 3.5–5)
ALP SERPL-CCNC: 93 U/L (ref 46–116)
ALT SERPL W P-5'-P-CCNC: 29 U/L (ref 12–78)
ANION GAP SERPL CALCULATED.3IONS-SCNC: 7 MMOL/L (ref 4–13)
AST SERPL W P-5'-P-CCNC: 21 U/L (ref 5–45)
BACTERIA UR CULT: ABNORMAL
BASOPHILS # BLD AUTO: 0.04 THOUSANDS/ΜL (ref 0–0.1)
BASOPHILS NFR BLD AUTO: 1 % (ref 0–1)
BILIRUB SERPL-MCNC: 0.81 MG/DL (ref 0.2–1)
BUN SERPL-MCNC: 14 MG/DL (ref 5–25)
CALCIUM SERPL-MCNC: 9.8 MG/DL (ref 8.3–10.1)
CHLORIDE SERPL-SCNC: 105 MMOL/L (ref 100–108)
CHOLEST SERPL-MCNC: 205 MG/DL (ref 50–200)
CO2 SERPL-SCNC: 28 MMOL/L (ref 21–32)
CREAT SERPL-MCNC: 0.88 MG/DL (ref 0.6–1.3)
EOSINOPHIL # BLD AUTO: 0.25 THOUSAND/ΜL (ref 0–0.61)
EOSINOPHIL NFR BLD AUTO: 5 % (ref 0–6)
ERYTHROCYTE [DISTWIDTH] IN BLOOD BY AUTOMATED COUNT: 13.9 % (ref 11.6–15.1)
GFR SERPL CREATININE-BSD FRML MDRD: 67 ML/MIN/1.73SQ M
GLUCOSE P FAST SERPL-MCNC: 95 MG/DL (ref 65–99)
HAV IGM SER QL: NORMAL
HBV CORE IGM SER QL: NORMAL
HBV SURFACE AG SER QL: NORMAL
HCT VFR BLD AUTO: 42.4 % (ref 34.8–46.1)
HCV AB SER QL: NORMAL
HDLC SERPL-MCNC: 72 MG/DL
HGB BLD-MCNC: 13.5 G/DL (ref 11.5–15.4)
IMM GRANULOCYTES # BLD AUTO: 0.01 THOUSAND/UL (ref 0–0.2)
IMM GRANULOCYTES NFR BLD AUTO: 0 % (ref 0–2)
LDLC SERPL CALC-MCNC: 114 MG/DL (ref 0–100)
LYMPHOCYTES # BLD AUTO: 0.91 THOUSANDS/ΜL (ref 0.6–4.47)
LYMPHOCYTES NFR BLD AUTO: 18 % (ref 14–44)
MCH RBC QN AUTO: 26.9 PG (ref 26.8–34.3)
MCHC RBC AUTO-ENTMCNC: 31.8 G/DL (ref 31.4–37.4)
MCV RBC AUTO: 85 FL (ref 82–98)
MONOCYTES # BLD AUTO: 0.41 THOUSAND/ΜL (ref 0.17–1.22)
MONOCYTES NFR BLD AUTO: 8 % (ref 4–12)
NEUTROPHILS # BLD AUTO: 3.37 THOUSANDS/ΜL (ref 1.85–7.62)
NEUTS SEG NFR BLD AUTO: 68 % (ref 43–75)
NONHDLC SERPL-MCNC: 133 MG/DL
NRBC BLD AUTO-RTO: 0 /100 WBCS
PLATELET # BLD AUTO: 270 THOUSANDS/UL (ref 149–390)
PMV BLD AUTO: 8.7 FL (ref 8.9–12.7)
POTASSIUM SERPL-SCNC: 4.7 MMOL/L (ref 3.5–5.3)
PROT SERPL-MCNC: 8.4 G/DL (ref 6.4–8.2)
RBC # BLD AUTO: 5.01 MILLION/UL (ref 3.81–5.12)
SODIUM SERPL-SCNC: 140 MMOL/L (ref 136–145)
TRIGL SERPL-MCNC: 96 MG/DL
TSH SERPL DL<=0.05 MIU/L-ACNC: 1.6 UIU/ML (ref 0.36–3.74)
WBC # BLD AUTO: 4.99 THOUSAND/UL (ref 4.31–10.16)

## 2020-08-28 PROCEDURE — 80074 ACUTE HEPATITIS PANEL: CPT

## 2020-08-28 PROCEDURE — 84443 ASSAY THYROID STIM HORMONE: CPT

## 2020-08-28 PROCEDURE — 80061 LIPID PANEL: CPT | Performed by: NURSE PRACTITIONER

## 2020-08-28 PROCEDURE — 85025 COMPLETE CBC W/AUTO DIFF WBC: CPT

## 2020-08-28 PROCEDURE — 80053 COMPREHEN METABOLIC PANEL: CPT

## 2020-08-28 PROCEDURE — 36415 COLL VENOUS BLD VENIPUNCTURE: CPT

## 2020-08-28 PROCEDURE — 82306 VITAMIN D 25 HYDROXY: CPT

## 2020-08-31 ENCOUNTER — HOSPITAL ENCOUNTER (OUTPATIENT)
Dept: ULTRASOUND IMAGING | Facility: HOSPITAL | Age: 70
Discharge: HOME | End: 2020-08-31
Payer: COMMERCIAL

## 2020-08-31 DIAGNOSIS — N39.0 RECURRENT UTI: ICD-10-CM

## 2020-08-31 DIAGNOSIS — R80.9 PROTEINURIA, UNSPECIFIED TYPE: ICD-10-CM

## 2020-08-31 PROCEDURE — 76770 US EXAM ABDO BACK WALL COMP: CPT

## 2020-09-01 ENCOUNTER — HOSPITAL ENCOUNTER (OUTPATIENT)
Dept: BONE DENSITY | Facility: MEDICAL CENTER | Age: 70
Discharge: HOME | End: 2020-09-01
Payer: COMMERCIAL

## 2020-09-01 DIAGNOSIS — M81.0 AGE-RELATED OSTEOPOROSIS WITHOUT CURRENT PATHOLOGICAL FRACTURE: ICD-10-CM

## 2020-09-01 PROCEDURE — 77080 DXA BONE DENSITY AXIAL: CPT

## 2020-09-15 ENCOUNTER — OFFICE VISIT (OUTPATIENT)
Dept: FAMILY MEDICINE CLINIC | Facility: CLINIC | Age: 70
End: 2020-09-15
Payer: COMMERCIAL

## 2020-09-15 VITALS
RESPIRATION RATE: 18 BRPM | HEART RATE: 60 BPM | DIASTOLIC BLOOD PRESSURE: 80 MMHG | SYSTOLIC BLOOD PRESSURE: 118 MMHG | BODY MASS INDEX: 33.56 KG/M2 | HEIGHT: 55 IN | TEMPERATURE: 97.8 F | WEIGHT: 145 LBS

## 2020-09-15 DIAGNOSIS — Z23 ENCOUNTER FOR IMMUNIZATION: ICD-10-CM

## 2020-09-15 DIAGNOSIS — E66.09 CLASS 1 OBESITY DUE TO EXCESS CALORIES WITHOUT SERIOUS COMORBIDITY WITH BODY MASS INDEX (BMI) OF 34.0 TO 34.9 IN ADULT: ICD-10-CM

## 2020-09-15 DIAGNOSIS — N32.81 OAB (OVERACTIVE BLADDER): Primary | ICD-10-CM

## 2020-09-15 DIAGNOSIS — E55.9 VITAMIN D DEFICIENCY: ICD-10-CM

## 2020-09-15 PROBLEM — R33.9 URINARY RETENTION WITH INCOMPLETE BLADDER EMPTYING: Status: ACTIVE | Noted: 2020-09-15

## 2020-09-15 PROCEDURE — 3725F SCREEN DEPRESSION PERFORMED: CPT | Performed by: NURSE PRACTITIONER

## 2020-09-15 PROCEDURE — 99214 OFFICE O/P EST MOD 30 MIN: CPT | Performed by: NURSE PRACTITIONER

## 2020-09-15 PROCEDURE — G0008 ADMIN INFLUENZA VIRUS VAC: HCPCS | Performed by: NURSE PRACTITIONER

## 2020-09-15 PROCEDURE — 90662 IIV NO PRSV INCREASED AG IM: CPT | Performed by: NURSE PRACTITIONER

## 2020-09-15 RX ORDER — SOLIFENACIN SUCCINATE 5 MG/1
5 TABLET, FILM COATED ORAL DAILY
Qty: 30 TABLET | Refills: 3 | Status: SHIPPED | OUTPATIENT
Start: 2020-09-15 | End: 2021-02-26 | Stop reason: SINTOL

## 2020-09-15 NOTE — ASSESSMENT & PLAN NOTE
Most recent US with PVR revealed patient having incomplete emptying  We discussed medications to try to improve this  Certainly with retention would put patient at risk for recurrent UTI which have been occurring  Patient has urology appointment October 1st  We will start vesicare 5mg daily  response to medication can be followed up with by urology

## 2020-09-15 NOTE — PROGRESS NOTES
Assessment and Plan:    Problem List Items Addressed This Visit        Genitourinary    OAB (overactive bladder) - Primary     Most recent US with PVR revealed patient having incomplete emptying  We discussed medications to try to improve this  Certainly with retention would put patient at risk for recurrent UTI which have been occurring  Patient has urology appointment October 1st  We will start vesicare 5mg daily  response to medication can be followed up with by urology  Relevant Medications    solifenacin (VESICARE) 5 mg tablet       Other    Class 1 obesity in adult    Vitamin D deficiency     Patient vitamin d is low  She is to continue with her supplements  Other Visit Diagnoses     Encounter for immunization        Relevant Orders    influenza vaccine, high-dose, PF 0 7 mL (FLUZONE HIGH-DOSE) (Completed)                 Diagnoses and all orders for this visit:    OAB (overactive bladder)  -     solifenacin (VESICARE) 5 mg tablet; Take 1 tablet (5 mg total) by mouth daily    Encounter for immunization  -     influenza vaccine, high-dose, PF 0 7 mL (FLUZONE HIGH-DOSE)    Class 1 obesity due to excess calories without serious comorbidity with body mass index (BMI) of 34 0 to 34 9 in adult    Vitamin D deficiency              Subjective:      Patient ID: Jane Ng is a 72 y o  female  CC:    Chief Complaint   Patient presents with    Results    Follow-up       HPI:    Patient presents today to review her blood work  She reports otherwise she is doing well today  Patient states she is taking vitamin d3 4000 units daily as well as a calcium  She also has bladder US to review: patient has had multiple UTI recently which prompted evaluation of kidneys and bladder  Patient also experiencing urgency and feeling like she still needs to go to that bathroom again        The following portions of the patient's history were reviewed and updated as appropriate: allergies, current medications, past family history, past medical history, past social history, past surgical history and problem list       Review of Systems   Constitutional: Negative  Respiratory: Negative  Cardiovascular: Negative  Genitourinary: Positive for dysuria and frequency  Psychiatric/Behavioral: Negative  Negative for suicidal ideas  Data to review:   Cholesterol   Date Value Ref Range Status   08/28/2020 205 (H) 50 - 200 mg/dL Final     Comment:     Cholesterol:       Desirable         <200 mg/dl       Borderline         200-239 mg/dl       High              >239            HDL, Direct   Date Value Ref Range Status   08/28/2020 72 >=40 mg/dL Final     Comment:     HDL Cholesterol:       Low     <41 mg/dL  Specimen collection should occur prior to Metamizole administration due to the potential for falsley depressed results  Triglycerides   Date Value Ref Range Status   08/28/2020 96 <=150 mg/dL Final     Comment:     Triglyceride:     Normal          <150 mg/dl     Borderline High 150-199 mg/dl     High            200-499 mg/dl        Very High       >499 mg/dl    Specimen collection should occur prior to N-Acetylcysteine or Metamizole administration due to the potential for falsely depressed results  Glucose   Date Value Ref Range Status   07/26/2018 87 65 - 140 mg/dL Final     Comment:       If the patient is fasting, the ADA then defines impaired fasting glucose as > 100 mg/dL and diabetes as > or equal to 123 mg/dL  Specimen collection should occur prior to Sulfasalazine administration due to the potential for falsely depressed results  Specimen collection should occur prior to Sulfapyridine administration due to the potential for falsely elevated results       Sodium   Date Value Ref Range Status   08/28/2020 140 136 - 145 mmol/L Final     Potassium   Date Value Ref Range Status   08/28/2020 4 7 3 5 - 5 3 mmol/L Final     Creatinine   Date Value Ref Range Status   08/28/2020 0 88 0 60 - 1 30 mg/dL Final     Comment:     Standardized to IDMS reference method     AST   Date Value Ref Range Status   08/28/2020 21 5 - 45 U/L Final     Comment:     Specimen collection should occur prior to Sulfasalazine administration due to the potential for falsely depressed results  ALT   Date Value Ref Range Status   08/28/2020 29 12 - 78 U/L Final     Comment:     Specimen collection should occur prior to Sulfasalazine administration due to the potential for falsely depressed results  Vit D, 25-Hydroxy   Date Value Ref Range Status   08/28/2020 29 4 (L) 30 0 - 100 0 ng/mL Final     Calcium   Date Value Ref Range Status   08/28/2020 9 8 8 3 - 10 1 mg/dL Final       TSH 3RD GENERATON   Date Value Ref Range Status   08/28/2020 1 597 0 358 - 3 740 uIU/mL Final     Comment:     The recommended reference ranges for TSH during pregnancy are as follows:   First trimester 0 1 to 2 5 uIU/mL   Second trimester  0 2 to 3 0 uIU/mL   Third trimester 0 3 to 3 0 uIU/m    Note: Normal ranges may not apply to patients who are transgender, non-binary, or whose legal sex, sex at birth, and gender identity differ  Using supplements with high doses of biotin 20 to more than 300 times greater than the adequate daily intake for adults of 30 mcg/day as established by the [employer], can cause falsely depress results  Objective:    Vitals:    09/15/20 0942   BP: 118/80   BP Location: Left arm   Patient Position: Sitting   Cuff Size: Adult   Pulse: 60   Resp: 18   Temp: 97 8 °F (36 6 °C)   TempSrc: Tympanic   Weight: 65 8 kg (145 lb)   Height: 4' 6" (1 372 m)        Physical Exam  Vitals signs and nursing note reviewed  Constitutional:       General: She is not in acute distress  Appearance: Normal appearance  She is not ill-appearing or diaphoretic  HENT:      Head: Normocephalic and atraumatic        Right Ear: External ear normal       Left Ear: External ear normal    Eyes:      Extraocular Movements: Extraocular movements intact  Conjunctiva/sclera: Conjunctivae normal    Cardiovascular:      Rate and Rhythm: Normal rate and regular rhythm  Heart sounds: Normal heart sounds, S1 normal and S2 normal  No murmur  Pulmonary:      Effort: Pulmonary effort is normal       Breath sounds: Normal breath sounds  Abdominal:      General: Abdomen is protuberant  Palpations: Abdomen is soft  Tenderness: There is no abdominal tenderness  There is no right CVA tenderness or left CVA tenderness  Skin:     Coloration: Skin is not pale  Neurological:      Mental Status: She is alert and oriented to person, place, and time  Psychiatric:         Mood and Affect: Mood normal          Behavior: Behavior normal          Thought Content: Thought content normal          Judgment: Judgment normal            BMI Counseling: Body mass index is 34 96 kg/m²  The BMI is above normal  Nutrition recommendations include decreasing portion sizes, moderation in carbohydrate intake, reducing intake of saturated and trans fat and reducing intake of cholesterol  Exercise recommendations include moderate physical activity 150 minutes/week and strength training exercises

## 2020-10-01 ENCOUNTER — CONSULT (OUTPATIENT)
Dept: UROLOGY | Facility: MEDICAL CENTER | Age: 70
End: 2020-10-01
Payer: COMMERCIAL

## 2020-10-01 VITALS
DIASTOLIC BLOOD PRESSURE: 82 MMHG | BODY MASS INDEX: 33.56 KG/M2 | TEMPERATURE: 97.4 F | HEIGHT: 55 IN | SYSTOLIC BLOOD PRESSURE: 118 MMHG | WEIGHT: 145 LBS

## 2020-10-01 DIAGNOSIS — N39.0 RECURRENT UTI: Primary | ICD-10-CM

## 2020-10-01 DIAGNOSIS — N13.30 HYDRONEPHROSIS OF RIGHT KIDNEY: ICD-10-CM

## 2020-10-01 DIAGNOSIS — N30.00 ACUTE CYSTITIS WITHOUT HEMATURIA: ICD-10-CM

## 2020-10-01 DIAGNOSIS — R33.9 INCOMPLETE BLADDER EMPTYING: ICD-10-CM

## 2020-10-01 LAB
SL AMB  POCT GLUCOSE, UA: NORMAL
SL AMB LEUKOCYTE ESTERASE,UA: NORMAL
SL AMB POCT BILIRUBIN,UA: NORMAL
SL AMB POCT BLOOD,UA: NORMAL
SL AMB POCT CLARITY,UA: NORMAL
SL AMB POCT COLOR,UA: YELLOW
SL AMB POCT KETONES,UA: NORMAL
SL AMB POCT NITRITE,UA: POSITIVE
SL AMB POCT PH,UA: 7
SL AMB POCT SPECIFIC GRAVITY,UA: 1.02
SL AMB POCT URINE PROTEIN: NORMAL
SL AMB POCT UROBILINOGEN: 0.2

## 2020-10-01 PROCEDURE — 87186 SC STD MICRODIL/AGAR DIL: CPT | Performed by: UROLOGY

## 2020-10-01 PROCEDURE — 87086 URINE CULTURE/COLONY COUNT: CPT | Performed by: UROLOGY

## 2020-10-01 PROCEDURE — 81003 URINALYSIS AUTO W/O SCOPE: CPT | Performed by: UROLOGY

## 2020-10-01 PROCEDURE — 99214 OFFICE O/P EST MOD 30 MIN: CPT | Performed by: UROLOGY

## 2020-10-01 PROCEDURE — 87077 CULTURE AEROBIC IDENTIFY: CPT | Performed by: UROLOGY

## 2020-10-01 PROCEDURE — 1036F TOBACCO NON-USER: CPT | Performed by: UROLOGY

## 2020-10-01 PROCEDURE — 1160F RVW MEDS BY RX/DR IN RCRD: CPT | Performed by: UROLOGY

## 2020-10-01 RX ORDER — CEPHALEXIN 250 MG/1
250 CAPSULE ORAL DAILY
Qty: 90 CAPSULE | Refills: 1 | Status: SHIPPED | OUTPATIENT
Start: 2020-10-01 | End: 2021-01-17 | Stop reason: SDUPTHER

## 2020-10-01 RX ORDER — CEPHALEXIN 500 MG/1
500 CAPSULE ORAL EVERY 6 HOURS SCHEDULED
Qty: 28 CAPSULE | Refills: 0 | Status: SHIPPED | OUTPATIENT
Start: 2020-10-01 | End: 2020-10-08 | Stop reason: ALTCHOICE

## 2020-10-04 LAB — BACTERIA UR CULT: ABNORMAL

## 2020-10-05 ENCOUNTER — TELEPHONE (OUTPATIENT)
Dept: UROLOGY | Facility: MEDICAL CENTER | Age: 70
End: 2020-10-05

## 2020-10-05 DIAGNOSIS — N30.00 ACUTE CYSTITIS WITHOUT HEMATURIA: Primary | ICD-10-CM

## 2020-10-05 RX ORDER — TETRACYCLINE HYDROCHLORIDE 500 MG/1
500 CAPSULE ORAL 4 TIMES DAILY
Qty: 28 CAPSULE | Refills: 0 | Status: SHIPPED | OUTPATIENT
Start: 2020-10-05 | End: 2020-10-12

## 2020-10-26 ENCOUNTER — OFFICE VISIT (OUTPATIENT)
Dept: GASTROENTEROLOGY | Facility: MEDICAL CENTER | Age: 70
End: 2020-10-26
Payer: COMMERCIAL

## 2020-10-26 VITALS
BODY MASS INDEX: 33.09 KG/M2 | SYSTOLIC BLOOD PRESSURE: 137 MMHG | HEIGHT: 55 IN | WEIGHT: 143 LBS | DIASTOLIC BLOOD PRESSURE: 81 MMHG | TEMPERATURE: 98.5 F | HEART RATE: 71 BPM

## 2020-10-26 DIAGNOSIS — R14.0 BLOATING: Primary | ICD-10-CM

## 2020-10-26 DIAGNOSIS — K21.9 GASTROESOPHAGEAL REFLUX DISEASE WITHOUT ESOPHAGITIS: ICD-10-CM

## 2020-10-26 PROCEDURE — 1160F RVW MEDS BY RX/DR IN RCRD: CPT | Performed by: INTERNAL MEDICINE

## 2020-10-26 PROCEDURE — 3008F BODY MASS INDEX DOCD: CPT | Performed by: INTERNAL MEDICINE

## 2020-10-26 PROCEDURE — 99214 OFFICE O/P EST MOD 30 MIN: CPT | Performed by: INTERNAL MEDICINE

## 2020-10-30 ENCOUNTER — HOSPITAL ENCOUNTER (OUTPATIENT)
Dept: ULTRASOUND IMAGING | Facility: HOSPITAL | Age: 70
Discharge: HOME | End: 2020-10-30
Attending: INTERNAL MEDICINE
Payer: COMMERCIAL

## 2020-10-30 DIAGNOSIS — R14.0 BLOATING: ICD-10-CM

## 2020-10-30 PROCEDURE — 76705 ECHO EXAM OF ABDOMEN: CPT

## 2020-11-05 ENCOUNTER — TELEPHONE (OUTPATIENT)
Dept: GASTROENTEROLOGY | Facility: CLINIC | Age: 70
End: 2020-11-05

## 2020-12-15 ENCOUNTER — TELEPHONE (OUTPATIENT)
Dept: FAMILY MEDICINE CLINIC | Facility: CLINIC | Age: 70
End: 2020-12-15

## 2020-12-15 ENCOUNTER — VBI (OUTPATIENT)
Dept: ADMINISTRATIVE | Facility: OTHER | Age: 70
End: 2020-12-15

## 2020-12-15 DIAGNOSIS — K21.9 GASTROESOPHAGEAL REFLUX DISEASE WITHOUT ESOPHAGITIS: ICD-10-CM

## 2020-12-15 RX ORDER — OMEPRAZOLE 20 MG/1
20 CAPSULE, DELAYED RELEASE ORAL DAILY
Qty: 90 CAPSULE | Refills: 1 | Status: SHIPPED | OUTPATIENT
Start: 2020-12-15 | End: 2021-07-22

## 2020-12-29 DIAGNOSIS — N39.0 RECURRENT UTI: ICD-10-CM

## 2021-01-14 ENCOUNTER — TELEPHONE (OUTPATIENT)
Dept: UROLOGY | Facility: AMBULATORY SURGERY CENTER | Age: 71
End: 2021-01-14

## 2021-01-14 NOTE — TELEPHONE ENCOUNTER
Called pt's daughter per comm consent and advised that pt received a rx for Keflex on 10/1/20  It was for 90 pills with one refill  Pt should have enough to last through  March  Per Dr Bills Loss note from 10/1/20 pt is to take Keflex 250 daily for 6 months  Pt's daughter is going to check on her mother's medication to ensure she is taking it properly and to check on status of the refill with the pharmacy

## 2021-01-14 NOTE — TELEPHONE ENCOUNTER
Patient's daughter calling to inquire if a rill of Keflex will be sent to pharmacy for her mother as her CYSTO with Dr Javier Feliz isn't until end of Feb  Please call daughter [facility]) to inform if will/will not be filled again

## 2021-01-17 RX ORDER — CEPHALEXIN 250 MG/1
250 CAPSULE ORAL DAILY
Qty: 90 CAPSULE | Refills: 0 | Status: SHIPPED | OUTPATIENT
Start: 2021-01-17 | End: 2021-02-26 | Stop reason: SDUPTHER

## 2021-01-25 DIAGNOSIS — Z12.31 ENCOUNTER FOR SCREENING MAMMOGRAM FOR BREAST CANCER: Primary | ICD-10-CM

## 2021-02-26 ENCOUNTER — PROCEDURE VISIT (OUTPATIENT)
Dept: UROLOGY | Facility: MEDICAL CENTER | Age: 71
End: 2021-02-26
Payer: COMMERCIAL

## 2021-02-26 VITALS
WEIGHT: 140 LBS | HEART RATE: 86 BPM | SYSTOLIC BLOOD PRESSURE: 120 MMHG | DIASTOLIC BLOOD PRESSURE: 70 MMHG | HEIGHT: 55 IN | BODY MASS INDEX: 32.4 KG/M2

## 2021-02-26 DIAGNOSIS — N13.30 HYDRONEPHROSIS OF RIGHT KIDNEY: ICD-10-CM

## 2021-02-26 DIAGNOSIS — M62.89 PELVIC FLOOR DYSFUNCTION: ICD-10-CM

## 2021-02-26 DIAGNOSIS — N39.0 RECURRENT UTI: Primary | ICD-10-CM

## 2021-02-26 DIAGNOSIS — R33.9 INCOMPLETE BLADDER EMPTYING: ICD-10-CM

## 2021-02-26 LAB
SL AMB  POCT GLUCOSE, UA: ABNORMAL
SL AMB LEUKOCYTE ESTERASE,UA: ABNORMAL
SL AMB POCT BILIRUBIN,UA: ABNORMAL
SL AMB POCT BLOOD,UA: ABNORMAL
SL AMB POCT CLARITY,UA: CLEAR
SL AMB POCT COLOR,UA: YELLOW
SL AMB POCT KETONES,UA: ABNORMAL
SL AMB POCT NITRITE,UA: ABNORMAL
SL AMB POCT PH,UA: 7
SL AMB POCT SPECIFIC GRAVITY,UA: 1.01
SL AMB POCT URINE PROTEIN: ABNORMAL
SL AMB POCT UROBILINOGEN: 0.2

## 2021-02-26 PROCEDURE — 52000 CYSTOURETHROSCOPY: CPT | Performed by: UROLOGY

## 2021-02-26 PROCEDURE — 3008F BODY MASS INDEX DOCD: CPT | Performed by: UROLOGY

## 2021-02-26 PROCEDURE — 1160F RVW MEDS BY RX/DR IN RCRD: CPT | Performed by: UROLOGY

## 2021-02-26 PROCEDURE — 81003 URINALYSIS AUTO W/O SCOPE: CPT | Performed by: UROLOGY

## 2021-02-26 PROCEDURE — 99214 OFFICE O/P EST MOD 30 MIN: CPT | Performed by: UROLOGY

## 2021-02-26 RX ORDER — MULTIVITAMIN
1 TABLET ORAL DAILY
COMMUNITY

## 2021-02-26 RX ORDER — CEPHALEXIN 250 MG/1
250 CAPSULE ORAL DAILY
Qty: 90 CAPSULE | Refills: 1 | Status: SHIPPED | OUTPATIENT
Start: 2021-02-26 | End: 2021-08-31

## 2021-02-26 NOTE — PATIENT INSTRUCTIONS
Call me if   You  1 to be referred to physical therapy for pelvic floor exercises  Continue with the Keflex for 6 more months  See us in 6 months with bladder ultrasound and kidney ultrasound

## 2021-02-26 NOTE — LETTER
2021     Amy Mancilla, [address] [address] [address]    Patient: Felicia Castillo   YOB: 1950   Date of Visit: 2021       Dear Dr Camelia Lemus: Thank you for referring Felicia Castillo to me for evaluation  Below are my notes for this consultation  If you have questions, please do not hesitate to call me  I look forward to following your patient along with you  Sincerely,        Irvin Limon MD        CC: No Recipients  Irvin Limon MD  2021 11:21 AM  Sign when Signing Visit  [facility]  [address], [address] [place] [id number]  www  [facility]  org      NAME: Felicia Castillo  AGE: 72 y o  SEX: female  : 1950   MRN: [id number]    DATE: 2021  TIME: 11:19 AM    Assessment and Plan:    Recurrent UTI:  Continue with Keflex for another 6 months  Follow-up with renal ultrasound at that time for the bladder filling into check PVR  My impression is that she actually has a component of pelvic floor dysfunction and shy bladder causing large bladder capacity and slow stream  And she has mild right hydronephrosis with normal renal function     I can find no other etiologies  However the Keflex has been working very well for her  Physical therapy could be an option, but I think she would be too embarrassed to undergo this  However she will discuss this with her daughter and if she wishes to have this done I will center and referral for physical therapy upon her request                Chief Complaint   No chief complaint on file  History of Present Illness     Here for cystoscopy for recurrent UTI, which are beta-hemolytic Streptococcus  This is in the setting of very large bladder with incomplete bladder emptying  I last saw her 10/1/2020 and gave her a full course of Keflex and then start her on Keflex 250 mg daily for prophylaxis for 6 months    We   Stopped the VESIcare Because I think this is interfering with her bladder emptying  Renal ultrasound 8/31/2020 showed normal kidneys, no stones and there was minimal fullness of the right renal collecting system which was similar to prior studies  Her bladder volume was 1450 cc prior to voiding and then she had a PVR of 215 cc  Bilateral ureteral jets were seen  last UTI was Morganella Morgagni greater than 100,000 colonies resistant to Keflex, resistant to nitrofurantoin, susceptible to Bactrim  It is also susceptible to quinolones which she is allergic to these  At that time I started her on tetracycline for 7 days, then had a resume to 250 mg of Keflex daily afterwards  She complains of continued slow flow with low pressure  Otherwise she has been doing better that she has had no infections since that Morganella Morgagni infection  She continues with the Keflex  She denies having anxiety about voiding,  Such as shy bladder syndrome  Cystoscopy     Date/Time 2/26/2021 10:59 AM     Performed by  Raffy Powell MD     Authorized by Raffy Powell MD      Universal Protocol:  Consent: Verbal consent obtained  Written consent obtained  Procedure Details:  Procedure type: cystoscopy    Additional Procedure Details: Cystoscopy Procedure Note        Pre-operative Diagnosis:  Recurrent UTI, large bladder capacity with incomplete bladder emptying    Post-operative Diagnosis:  Same , basically normal bladder on exam     Procedure: Flexible cystoscopy    Surgeon: Aleyda Wheeler MD    Anesthesia: 1% Xylocaine per urethra    EBL: Minimal    Complications: none    Procedure Details   The risks, benefits, complications, treatment options, and expected outcomes were discussed with the patient  The patient concurred with the proposed plan, giving informed consent  Cystoscopy was performed today under local anesthesia, using sterile technique   The patient was placed in the supine position, prepped with Betadine, and draped in the usual sterile fashion  The flexible cystocope was used to inspect both the urethra and bladder    Findings:  Urethra:  Normal without stricture  Bladder:    2+ trabeculated and there were no stones tumors or other lesions  The orifices were orthotopic and intact  Specimens:   None                 Complications:  None           Disposition: To home            Condition:  Stable          The following portions of the patient's history were reviewed and updated as appropriate: allergies, current medications, past family history, past medical history, past social history, past surgical history and problem list   Past Medical History:   Diagnosis Date    Age-related osteoporosis without current pathological fracture 7/25/2018    Class 1 obesity in adult 3/26/2018    Gastroesophageal reflux disease without esophagitis 3/26/2018    Hernia, hiatal     Insomnia     Osteoporosis     Recurrent UTI 5/3/2018    [facility]     Ulcer, gastric, acute     Vitamin D deficiency      Past Surgical History:   Procedure Laterality Date    BREAST LUMPECTOMY Left 1997    benign    HERNIA REPAIR      umbilical hernia     TUBAL LIGATION       shoulder  Review of Systems   Review of Systems   Constitutional: Negative for fever  Respiratory: Negative for shortness of breath  Cardiovascular: Positive for palpitations  Negative for chest pain     Genitourinary:        Slow stream       Active Problem List     Patient Active Problem List   Diagnosis    Gastroesophageal reflux disease without esophagitis    Primary insomnia    Class 1 obesity in adult    Alopecia areata    Urinary frequency    Recurrent UTI    Incidental lung nodule, > 3mm and < 8mm    Loss of height    Post-menopausal    Age-related osteoporosis without current pathological fracture    Vitamin D deficiency    Hilar adenopathy    Bilirubin in urine    Proteinuria    OAB (overactive bladder)       Objective   /70   Pulse 86   Ht 4' 6" (1 372 m)   Wt 63 5 kg (140 lb)   BMI 33 76 kg/m²     Physical Exam  Constitutional:       Appearance: Normal appearance  HENT:      Head: Normocephalic and atraumatic  Eyes:      Extraocular Movements: Extraocular movements intact  Pulmonary:      Effort: Pulmonary effort is normal    Genitourinary:     General: Normal vulva  Vagina: No vaginal discharge  Musculoskeletal: Normal range of motion  Skin:     Coloration: Skin is not jaundiced or pale  Neurological:      General: No focal deficit present  Mental Status: She is alert and oriented to person, place, and time  Mental status is at baseline  Psychiatric:         Mood and Affect: Mood normal          Behavior: Behavior normal          Thought Content:  Thought content normal          Judgment: Judgment normal              Current Medications     Current Outpatient Medications:     b complex vitamins tablet, Take 1 tablet by mouth daily, Disp: , Rfl:     Bone Meal-Vitamin D (BONE MEAL PO), Take by mouth, Disp: , Rfl:     calcium carbonate-vitamin D (OSCAL-D) 500 mg-200 units per tablet, Take 1 tablet by mouth daily with breakfast, Disp: , Rfl:     cephalexin (KEFLEX) 250 mg capsule, Take 1 capsule (250 mg total) by mouth daily, Disp: 90 capsule, Rfl: 0    Collagen Hydrolysate POWD, by Does not apply route, Disp: , Rfl:     Multiple Vitamin (multivitamin) tablet, Take 1 tablet by mouth daily, Disp: , Rfl:     omeprazole (PriLOSEC) 20 mg delayed release capsule, Take 1 capsule (20 mg total) by mouth daily, Disp: 90 capsule, Rfl: 1    clindamycin (CLEOCIN) 150 mg capsule, , Disp: , Rfl:     D-Mannose POWD, Take by mouth, Disp: , Rfl:     LYSINE PO, Take by mouth, Disp: , Rfl:     omega-3-acid ethyl esters (LOVAZA) 1 g capsule, Take 2 g by mouth 2 (two) times a day, Disp: , Rfl:     vitamin E 100 UNIT capsule, Take 100 Units by mouth daily, Disp: , Rfl:         Irvin Limon MD

## 2021-02-26 NOTE — PROGRESS NOTES
[address] [facility]  [facility]  [address] [facility] [address]  [place], [address] [facility]  [phone]  www  [email or web address]  [email or web address]      NAME: Kavin Post  AGE: 72 y o  SEX: female  : 1950   MRN: [id number]    DATE: 2021  TIME: 11:19 AM    Assessment and Plan:    Recurrent UTI:  Continue with Keflex for another 6 months  Follow-up with renal ultrasound at that time for the bladder filling into check PVR  My impression is that she actually has a component of pelvic floor dysfunction and shy bladder causing large bladder capacity and slow stream  And she has mild right hydronephrosis with normal renal function     I can find no other etiologies  However the Keflex has been working very well for her  Physical therapy could be an option, but I think she would be too embarrassed to undergo this  However she will discuss this with her daughter and if she wishes to have this done I will center and referral for physical therapy upon her request                Chief Complaint   No chief complaint on file  History of Present Illness     Here for cystoscopy for recurrent UTI, which are beta-hemolytic Streptococcus  This is in the setting of very large bladder with incomplete bladder emptying  I last saw her 10/1/2020 and gave her a full course of Keflex and then start her on Keflex 250 mg daily for prophylaxis for 6 months  We   Stopped the VESIcare  Because I think this is interfering with her bladder emptying  Renal ultrasound 2020 showed normal kidneys, no stones and there was minimal fullness of the right renal collecting system which was similar to prior studies  Her bladder volume was 1450 cc prior to voiding and then she had a PVR of 215 cc  Bilateral ureteral jets were seen  last UTI was Morganella Morgagni greater than 100,000 colonies resistant to Keflex, resistant to nitrofurantoin, susceptible to Bactrim    It is also susceptible to quinolones which she is allergic to these  At that time I started her on tetracycline for 7 days, then had a resume to 250 mg of Keflex daily afterwards  She complains of continued slow flow with low pressure  Otherwise she has been doing better that she has had no infections since that Morganella Morgagni infection  She continues with the Keflex  She denies having anxiety about voiding,  Such as shy bladder syndrome  Cystoscopy     Date/Time 2/26/2021 10:59 AM     Performed by  Damon Doyle MD     Authorized by Damon Doyle MD      Universal Protocol:  Consent: Verbal consent obtained  Written consent obtained  Procedure Details:  Procedure type: cystoscopy    Additional Procedure Details: Cystoscopy Procedure Note        Pre-operative Diagnosis:  Recurrent UTI, large bladder capacity with incomplete bladder emptying    Post-operative Diagnosis:  Same , basically normal bladder on exam     Procedure: Flexible cystoscopy    Surgeon: Yvon Bartholomew MD    Anesthesia: 1% Xylocaine per urethra    EBL: Minimal    Complications: none    Procedure Details   The risks, benefits, complications, treatment options, and expected outcomes were discussed with the patient  The patient concurred with the proposed plan, giving informed consent  Cystoscopy was performed today under local anesthesia, using sterile technique  The patient was placed in the supine position, prepped with Betadine, and draped in the usual sterile fashion  The flexible cystocope was used to inspect both the urethra and bladder    Findings:  Urethra:  Normal without stricture  Bladder:    2+ trabeculated and there were no stones tumors or other lesions  The orifices were orthotopic and intact             Specimens:   None                 Complications:  None           Disposition: To home            Condition:  Stable          The following portions of the patient's history were reviewed and updated as appropriate: allergies, current medications, past family history, past medical history, past social history, past surgical history and problem list   Past Medical History:   Diagnosis Date    Age-related osteoporosis without current pathological fracture 7/25/2018    Class 1 obesity in adult 3/26/2018    Gastroesophageal reflux disease without esophagitis 3/26/2018    Hernia, hiatal     Insomnia     Osteoporosis     Recurrent UTI 5/3/2018    Stroke ([facility] [address] )     Ulcer, gastric, acute     Vitamin D deficiency      Past Surgical History:   Procedure Laterality Date    BREAST LUMPECTOMY Left 1997    benign    HERNIA REPAIR      umbilical hernia     TUBAL LIGATION       shoulder  Review of Systems   Review of Systems   Constitutional: Negative for fever  Respiratory: Negative for shortness of breath  Cardiovascular: Positive for palpitations  Negative for chest pain  Genitourinary:        Slow stream       Active Problem List     Patient Active Problem List   Diagnosis    Gastroesophageal reflux disease without esophagitis    Primary insomnia    Class 1 obesity in adult    Alopecia areata    Urinary frequency    Recurrent UTI    Incidental lung nodule, > 3mm and < 8mm    Loss of height    Post-menopausal    Age-related osteoporosis without current pathological fracture    Vitamin D deficiency    Hilar adenopathy    Bilirubin in urine    Proteinuria    OAB (overactive bladder)       Objective   /70   Pulse 86   Ht 4' 6" (1 372 m)   Wt 63 5 kg (140 lb)   BMI 33 76 kg/m²     Physical Exam  Constitutional:       Appearance: Normal appearance  HENT:      Head: Normocephalic and atraumatic  Eyes:      Extraocular Movements: Extraocular movements intact  Pulmonary:      Effort: Pulmonary effort is normal    Genitourinary:     General: Normal vulva  Vagina: No vaginal discharge  Musculoskeletal: Normal range of motion  Skin:     Coloration: Skin is not jaundiced or pale  Neurological:      General: No focal deficit present  Mental Status: She is alert and oriented to person, place, and time  Mental status is at baseline  Psychiatric:         Mood and Affect: Mood normal          Behavior: Behavior normal          Thought Content:  Thought content normal          Judgment: Judgment normal              Current Medications     Current Outpatient Medications:     b complex vitamins tablet, Take 1 tablet by mouth daily, Disp: , Rfl:     Bone Meal-Vitamin D (BONE MEAL PO), Take by mouth, Disp: , Rfl:     calcium carbonate-vitamin D (OSCAL-D) 500 mg-200 units per tablet, Take 1 tablet by mouth daily with breakfast, Disp: , Rfl:     cephalexin (KEFLEX) 250 mg capsule, Take 1 capsule (250 mg total) by mouth daily, Disp: 90 capsule, Rfl: 0    Collagen Hydrolysate POWD, by Does not apply route, Disp: , Rfl:     Multiple Vitamin (multivitamin) tablet, Take 1 tablet by mouth daily, Disp: , Rfl:     omeprazole (PriLOSEC) 20 mg delayed release capsule, Take 1 capsule (20 mg total) by mouth daily, Disp: 90 capsule, Rfl: 1    clindamycin (CLEOCIN) 150 mg capsule, , Disp: , Rfl:     D-Mannose POWD, Take by mouth, Disp: , Rfl:     LYSINE PO, Take by mouth, Disp: , Rfl:     omega-3-acid ethyl esters (LOVAZA) 1 g capsule, Take 2 g by mouth 2 (two) times a day, Disp: , Rfl:     vitamin E 100 UNIT capsule, Take 100 Units by mouth daily, Disp: , Rfl:         Chapis De Oliveira MD

## 2021-03-10 DIAGNOSIS — Z23 ENCOUNTER FOR IMMUNIZATION: ICD-10-CM

## 2021-03-24 ENCOUNTER — IMMUNIZATIONS (OUTPATIENT)
Dept: FAMILY MEDICINE CLINIC | Facility: HOSPITAL | Age: 71
End: 2021-03-24

## 2021-03-24 DIAGNOSIS — Z23 ENCOUNTER FOR IMMUNIZATION: Primary | ICD-10-CM

## 2021-03-24 PROCEDURE — 0011A SARS-COV-2 / COVID-19 MRNA VACCINE (MODERNA) 100 MCG: CPT

## 2021-03-24 PROCEDURE — 91301 SARS-COV-2 / COVID-19 MRNA VACCINE (MODERNA) 100 MCG: CPT

## 2021-04-23 ENCOUNTER — IMMUNIZATIONS (OUTPATIENT)
Dept: FAMILY MEDICINE CLINIC | Facility: HOSPITAL | Age: 71
End: 2021-04-23

## 2021-04-23 DIAGNOSIS — Z23 ENCOUNTER FOR IMMUNIZATION: Primary | ICD-10-CM

## 2021-04-23 PROCEDURE — 91301 SARS-COV-2 / COVID-19 MRNA VACCINE (MODERNA) 100 MCG: CPT

## 2021-04-23 PROCEDURE — 0012A SARS-COV-2 / COVID-19 MRNA VACCINE (MODERNA) 100 MCG: CPT

## 2021-05-18 ENCOUNTER — VBI (OUTPATIENT)
Dept: ADMINISTRATIVE | Facility: OTHER | Age: 71
End: 2021-05-18

## 2021-06-01 ENCOUNTER — RA CDI HCC (OUTPATIENT)
Dept: OTHER | Facility: HOSPITAL | Age: 71
End: 2021-06-01

## 2021-06-01 NOTE — PROGRESS NOTES
[facility]  coding opportunities          Chart reviewed, no opportunity found: CHART REVIEWED, NO OPPORTUNITY FOUND              Patients insurance company: [employer] (Medicare Advantage and Commercial)

## 2021-06-10 ENCOUNTER — RA CDI HCC (OUTPATIENT)
Dept: OTHER | Facility: HOSPITAL | Age: 71
End: 2021-06-10

## 2021-06-10 NOTE — PROGRESS NOTES
[facility]  coding opportunities          Chart reviewed, no opportunity found: CHART REVIEWED, NO OPPORTUNITY FOUND                     Patients insurance company: [employer] (Medicare Advantage and Commercial)

## 2021-06-15 NOTE — PROGRESS NOTES
Assessment & Plan:     K21 9 Gastroesophageal reflux disease without esophagitis  I have evaluated the patient and found the condition to be: Stable  I have evaluated the patient and: Recommended continue with same treatment plan  The patient should continue treatment and follow-up with: GI    M81 0 Age-related osteoporosis without current pathological fracture  I have evaluated the patient and found the condition to be: Stable  I have evaluated the patient and: Recommended continue with same treatment plan  The patient should continue treatment and follow-up with: Rheumatology    N39 0 Recurrent UTI  I have evaluated the patient and found the condition to be: Stable  I have evaluated the patient and: Recommended continue with same treatment plan  The patient should continue treatment and follow-up with: Urology    E66 9 Class 1 obesity in adult  I have evaluated the patient and found the condition to be: Stable  I have evaluated the patient and: Recommended continue with same treatment plan  The patient should continue treatment and follow-up with: PCP    E55 9 Vitamin D deficiency  I have evaluated the patient and found the condition to be: Stable  I have evaluated the patient and: Recommended continue with same treatment plan  The patient should continue treatment and follow-up with: PCP           Subjective:     Patient ID: Alejandro Lancaster is a 72 y o  female     Chief Complaint   Patient presents with    Follow-up    Medicare Wellness Visit      Pt presents for f/u -  Elinor Handy -  Pt is being followed by GI , symptoms are stable on omeprazole 20mg    Osteoporosis - Pt has f/u appointment with rheumatology pt is taking Vit D and Oscal  Last Dexa was in 09/2020  Recurrent UTI - Pt sees urology, her symptoms have been stable on Keflex 250 mg daily for a 6mth treatment  Review of Systems   Constitutional: Negative for chills and fever  HENT: Negative for ear pain, sinus pressure and sore throat      Eyes: Negative for pain and visual disturbance  Respiratory: Negative for cough, chest tightness and shortness of breath  Cardiovascular: Negative for chest pain and palpitations  Gastrointestinal: Negative for abdominal pain, constipation, diarrhea, nausea and vomiting  Endocrine: Negative for cold intolerance and heat intolerance  Genitourinary: Negative for decreased urine volume, difficulty urinating, dysuria, flank pain, frequency, hematuria, pelvic pain and urgency  Musculoskeletal: Positive for back pain  Negative for arthralgias and myalgias  Skin: Negative for color change and rash  Allergic/Immunologic: Negative for environmental allergies  Neurological: Negative for dizziness, tremors, seizures, syncope, weakness and headaches  Hematological: Negative for adenopathy  Psychiatric/Behavioral: Negative for agitation, confusion and sleep disturbance  The patient is not nervous/anxious  All other systems reviewed and are negative  Objective:      /74 (BP Location: Left arm, Patient Position: Sitting, Cuff Size: Adult)   Pulse 78   Temp (!) 97 3 °F (36 3 °C) (Temporal)   Ht 4' 7" (1 397 m)   Wt 65 8 kg (145 lb)   BMI 33 70 kg/m²     Problem List Items Addressed This Visit        Digestive    Gastroesophageal reflux disease without esophagitis     Follows with GI  Takes omeprazole  Stable  Relevant Orders    CBC and differential       Musculoskeletal and Integument    Age-related osteoporosis without current pathological fracture     Does see rheumatology for infusion  Has follow up in July  Still currently taking calcium and vitamin d  dexa last in 2020         Relevant Orders    Vitamin D 25 hydroxy    TSH, 3rd generation with Free T4 reflex       Genitourinary    Recurrent UTI     Seeing urology currently on suppressive therapy   Has not had any break through UTI            Other    Incidental lung nodule, > 3mm and < 8mm     No additional follow up is needed based on ACR criteria  Vitamin D deficiency - Primary     Currently taking vitamin d  Needs update labs  Relevant Orders    Vitamin D 25 hydroxy      Other Visit Diagnoses     Encounter for lipid screening for cardiovascular disease        Relevant Orders    Lipid panel    Comprehensive metabolic panel    Medicare annual wellness visit, subsequent        Encounter for screening mammogram for malignant neoplasm of breast        Relevant Orders    Mammo screening bilateral w 3d & cad          Physical Exam  Vitals and nursing note reviewed  Constitutional:       General: She is not in acute distress  Appearance: Normal appearance  She is well-developed  She is not ill-appearing  HENT:      Head: Normocephalic and atraumatic  Nose: Nose normal  No nasal tenderness  Mouth/Throat:      Lips: Pink  Mouth: Mucous membranes are moist       Pharynx: No oropharyngeal exudate  Eyes:      Pupils: Pupils are equal, round, and reactive to light  Neck:      Thyroid: No thyromegaly  Vascular: No JVD  Cardiovascular:      Rate and Rhythm: Normal rate and regular rhythm  Pulses: Normal pulses  Carotid pulses are 2+ on the right side and 2+ on the left side  Heart sounds: Normal heart sounds, S1 normal and S2 normal  No murmur heard  Pulmonary:      Effort: Pulmonary effort is normal  No respiratory distress  Breath sounds: Normal breath sounds  Abdominal:      General: Bowel sounds are normal  There is no distension  Palpations: Abdomen is soft  Musculoskeletal:         General: No tenderness  Normal range of motion  Cervical back: No rigidity  Right lower leg: No edema  Left lower leg: No edema  Lymphadenopathy:      Cervical: No cervical adenopathy  Skin:     General: Skin is warm and dry  Capillary Refill: Capillary refill takes less than 2 seconds     Neurological:      Mental Status: She is alert and oriented to person, place, and time  Cranial Nerves: No cranial nerve deficit  Psychiatric:         Behavior: Behavior normal          Thought Content:  Thought content normal          Judgment: Judgment normal

## 2021-06-17 ENCOUNTER — OFFICE VISIT (OUTPATIENT)
Dept: FAMILY MEDICINE CLINIC | Facility: CLINIC | Age: 71
End: 2021-06-17
Payer: COMMERCIAL

## 2021-06-17 VITALS
WEIGHT: 145 LBS | HEART RATE: 78 BPM | TEMPERATURE: 97.3 F | DIASTOLIC BLOOD PRESSURE: 74 MMHG | SYSTOLIC BLOOD PRESSURE: 126 MMHG | HEIGHT: 55 IN | BODY MASS INDEX: 33.56 KG/M2

## 2021-06-17 DIAGNOSIS — M81.0 AGE-RELATED OSTEOPOROSIS WITHOUT CURRENT PATHOLOGICAL FRACTURE: ICD-10-CM

## 2021-06-17 DIAGNOSIS — Z13.220 ENCOUNTER FOR LIPID SCREENING FOR CARDIOVASCULAR DISEASE: ICD-10-CM

## 2021-06-17 DIAGNOSIS — K21.9 GASTROESOPHAGEAL REFLUX DISEASE WITHOUT ESOPHAGITIS: ICD-10-CM

## 2021-06-17 DIAGNOSIS — E55.9 VITAMIN D DEFICIENCY: Primary | ICD-10-CM

## 2021-06-17 DIAGNOSIS — Z00.00 MEDICARE ANNUAL WELLNESS VISIT, SUBSEQUENT: ICD-10-CM

## 2021-06-17 DIAGNOSIS — R91.1 INCIDENTAL LUNG NODULE, > 3MM AND < 8MM: ICD-10-CM

## 2021-06-17 DIAGNOSIS — Z12.31 ENCOUNTER FOR SCREENING MAMMOGRAM FOR MALIGNANT NEOPLASM OF BREAST: ICD-10-CM

## 2021-06-17 DIAGNOSIS — N39.0 RECURRENT UTI: ICD-10-CM

## 2021-06-17 DIAGNOSIS — Z13.6 ENCOUNTER FOR LIPID SCREENING FOR CARDIOVASCULAR DISEASE: ICD-10-CM

## 2021-06-17 PROBLEM — R35.0 URINARY FREQUENCY: Status: RESOLVED | Noted: 2018-05-03 | Resolved: 2021-06-17

## 2021-06-17 PROCEDURE — 3288F FALL RISK ASSESSMENT DOCD: CPT | Performed by: NURSE PRACTITIONER

## 2021-06-17 PROCEDURE — G0439 PPPS, SUBSEQ VISIT: HCPCS | Performed by: NURSE PRACTITIONER

## 2021-06-17 PROCEDURE — T1015 CLINIC SERVICE: HCPCS | Performed by: NURSE PRACTITIONER

## 2021-06-17 PROCEDURE — 99214 OFFICE O/P EST MOD 30 MIN: CPT | Performed by: NURSE PRACTITIONER

## 2021-06-17 NOTE — ASSESSMENT & PLAN NOTE
Does see rheumatology for infusion  Has follow up in July   Still currently taking calcium and vitamin d  dexa last in 2020

## 2021-06-17 NOTE — PROGRESS NOTES
Assessment and Plan:     Problem List Items Addressed This Visit        Digestive    Gastroesophageal reflux disease without esophagitis     Follows with GI  Takes omeprazole  Stable  Relevant Orders    CBC and differential       Musculoskeletal and Integument    Age-related osteoporosis without current pathological fracture     Does see rheumatology for infusion  Has follow up in July  Still currently taking calcium and vitamin d  dexa last in 2020         Relevant Orders    Vitamin D 25 hydroxy    TSH, 3rd generation with Free T4 reflex       Genitourinary    Recurrent UTI     Seeing urology currently on suppressive therapy  Has not had any break through UTI            Other    Incidental lung nodule, > 3mm and < 8mm     No additional follow up is needed based on ACR criteria  Vitamin D deficiency - Primary     Currently taking vitamin d  Needs update labs  Relevant Orders    Vitamin D 25 hydroxy      Other Visit Diagnoses     Encounter for lipid screening for cardiovascular disease        Relevant Orders    Lipid panel    Comprehensive metabolic panel    Medicare annual wellness visit, subsequent        Encounter for screening mammogram for malignant neoplasm of breast        Relevant Orders    Mammo screening bilateral w 3d & cad        BMI Counseling: Body mass index is 34 96 kg/m²  The BMI is above normal  Nutrition recommendations include decreasing portion sizes, moderation in carbohydrate intake, reducing intake of saturated and trans fat and reducing intake of cholesterol  Exercise recommendations include moderate physical activity 150 minutes/week and strength training exercises  Preventive health issues were discussed with patient, and age appropriate screening tests were ordered as noted in patient's After Visit Summary    Personalized health advice and appropriate referrals for health education or preventive services given if needed, as noted in patient's After Visit Summary       History of Present Illness:     Patient presents for Medicare Annual Wellness visit    Patient Care Team:  Chari Leyva as PCP - General (Family Medicine)  Chari Leyva as PCP - PCP-[facility] Attributed-Roster     Problem List:     Patient Active Problem List   Diagnosis    Gastroesophageal reflux disease without esophagitis    Primary insomnia    Class 1 obesity in adult    Alopecia areata    Recurrent UTI    Incidental lung nodule, > 3mm and < 8mm    Loss of height    Post-menopausal    Age-related osteoporosis without current pathological fracture    Vitamin D deficiency    Hilar adenopathy    Bilirubin in urine    Proteinuria    OAB (overactive bladder)      Past Medical and Surgical History:     Past Medical History:   Diagnosis Date    Age-related osteoporosis without current pathological fracture 7/25/2018    Class 1 obesity in adult 3/26/2018    Gastroesophageal reflux disease without esophagitis 3/26/2018    Hernia, hiatal     Insomnia     Osteoporosis     Recurrent UTI 5/3/2018    Stroke ([facility] [address] )     Ulcer, gastric, acute     Vitamin D deficiency      Past Surgical History:   Procedure Laterality Date    BREAST LUMPECTOMY Left 1997    benign    HERNIA REPAIR      umbilical hernia     TUBAL LIGATION        Family History:     Family History   Problem Relation Age of Onset    Diabetes Mother     Heart disease Father     COPD Father     Prostate cancer Brother       Social History:     Social History     Socioeconomic History    Marital status: /Civil Union     Spouse name: None    Number of children: None    Years of education: None    Highest education level: None   Occupational History    None   Tobacco Use    Smoking status: Never Smoker    Smokeless tobacco: Never Used    Tobacco comment: exposed to heavy smoke and chemicals   Substance and Sexual Activity    Alcohol use: No    Drug use: No    Sexual activity: Never Other Topics Concern    None   Social History Narrative    Consumes 1 cup of coffee per day        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     Social Determinants of [address] Strain:     Difficulty of Paying Living Expenses:    Food Insecurity:     Worried About [address] in the Last Year:    [address] in the Last Year:    Transportation Needs:     Lack of Transportation (Medical):      Lack of Transportation (Non-Medical):    Physical Activity:     Days of Exercise per Week:     Minutes of Exercise per Session:    Stress:     Feeling of Stress :    Social Connections:     Frequency of Communication with Friends and Family:     Frequency of Social Gatherings with Friends and Family:     Attends Alevism Services:     Active Member of Clubs or Organizations:     Attends Club or Organization Meetings:     Marital Status:    Intimate Partner Violence:     Fear of Current or Ex-Partner:     Emotionally Abused:     Physically Abused:     Sexually Abused:       Medications and Allergies:     Current Outpatient Medications   Medication Sig Dispense Refill    b complex vitamins tablet Take 1 tablet by mouth daily      Bone Meal-Vitamin D (BONE MEAL PO) Take by mouth      calcium carbonate-vitamin D (OSCAL-D) 500 mg-200 units per tablet Take 1 tablet by mouth daily with breakfast      cephalexin (KEFLEX) 250 mg capsule Take 1 capsule (250 mg total) by mouth daily 90 capsule 1    Collagen Hydrolysate POWD by Does not apply route      Multiple Vitamin (multivitamin) tablet Take 1 tablet by mouth daily      omega-3-acid ethyl esters (LOVAZA) 1 g capsule Take 2 g by mouth 2 (two) times a day      omeprazole (PriLOSEC) 20 mg delayed release capsule Take 1 capsule (20 mg total) by mouth daily 90 capsule 1    vitamin E 100 UNIT capsule Take 100 Units by mouth daily      clindamycin (CLEOCIN) 150 mg capsule       D-Mannose POWD Take by mouth  LYSINE PO Take by mouth       No current facility-administered medications for this visit  Allergies   Allergen Reactions    Ciprofloxacin Shortness Of Breath    Sulfa Antibiotics Shortness Of Breath     Stress and rash   Nitrofurantoin Nausea Only    Other Nausea Only     macrobid      Immunizations:     Immunization History   Administered Date(s) Administered    Influenza, high dose seasonal 0 7 mL 09/20/2018, 12/18/2019, 09/15/2020    Pneumococcal Conjugate 13-Valent 06/25/2018    Pneumococcal Polysaccharide PPV23 01/15/2020    SARS-CoV-2 / COVID-19 mRNA IM ([facility]) 03/24/2021, 04/23/2021      Health Maintenance:         Topic Date Due    MAMMOGRAM  07/17/2019    DXA SCAN  09/01/2022    Colorectal Cancer Screening  12/05/2024    Hepatitis C Screening  Completed         Topic Date Due    DTaP,Tdap,and Td Vaccines (1 - Tdap) Never done      Medicare Health Risk Assessment:     /74 (BP Location: Left arm, Patient Position: Sitting, Cuff Size: Adult)   Pulse 78   Temp (!) 97 3 °F (36 3 °C) (Temporal)   Ht 4' 7" (1 397 m)   Wt 65 8 kg (145 lb)   BMI 33 70 kg/m²      Jessica George is here for her Subsequent Wellness visit  Health Risk Assessment:   Patient rates overall health as good  Patient feels that their physical health rating is same  Patient is satisfied with their life  Eyesight was rated as slightly worse  Hearing was rated as slightly worse  Patient feels that their emotional and mental health rating is same  Patients states they are never, rarely angry  Patient states they are never, rarely unusually tired/fatigued  Pain experienced in the last 7 days has been none  Patient states that she has experienced no weight loss or gain in last 6 months  Depression Screening:   PHQ-2 Score: 0      Fall Risk Screening:    In the past year, patient has experienced: no history of falling in past year      Urinary Incontinence Screening:   Patient has not leaked urine accidently in the last six months  Home Safety:  Patient does not have trouble with stairs inside or outside of their home  Patient has working smoke alarms and has working carbon monoxide detector  Home safety hazards include: none  Nutrition:   Current diet is Regular  Medications:   Patient is currently taking over-the-counter supplements  OTC medications include: see medication list  Patient is able to manage medications  Activities of Daily Living (ADLs)/Instrumental Activities of Daily Living (IADLs):   Walk and transfer into and out of bed and chair?: Yes  Dress and groom yourself?: Yes    Bathe or shower yourself?: Yes    Feed yourself?  Yes  Do your laundry/housekeeping?: Yes  Manage your money, pay your bills and track your expenses?: Yes  Make your own meals?: Yes    Do your own shopping?: Yes    Previous Hospitalizations:   Any hospitalizations or ED visits within the last 12 months?: No      Advance Care Planning:   Living will: No    Advanced directive: No    Advanced directive counseling given: Yes    End of Life Decisions reviewed with patient: Yes      Cognitive Screening:   Provider or family/friend/caregiver concerned regarding cognition?: No    PREVENTIVE SCREENINGS      Cardiovascular Screening:    General: Screening Current    Due for: Lipid Panel      Diabetes Screening:     General: Screening Current    Due for: Blood Glucose      Colorectal Cancer Screening:     General: Screening Current      Breast Cancer Screening:     General: Risks and Benefits Discussed    Due for: Mammogram        Cervical Cancer Screening:    General: Screening Not Indicated and Risks and Benefits Discussed      Osteoporosis Screening:    General: History Osteoporosis and Screening Current      Abdominal Aortic Aneurysm (AAA) Screening:        General: Screening Not Indicated      Lung Cancer Screening:     General: Screening Not Indicated      Hepatitis C Screening:    General: Screening Current    Screening, Brief Intervention, and Referral to Treatment (SBIRT)    Screening      Single Item Drug Screening:  How often have you used an illegal drug (including marijuana) or a prescription medication for non-medical reasons in the past year? never    Single Item Drug Screen Score: 0  Interpretation: Negative screen for possible drug use disorder    Brief Intervention  Alcohol & drug use screenings were reviewed  No concerns regarding substance use disorder identified  Other Counseling Topics:   Skin self-exam and calcium and vitamin D intake and regular weightbearing exercise         Edelmira Joseph

## 2021-06-17 NOTE — PATIENT INSTRUCTIONS
Medicare Preventive Visit Patient Instructions  Thank you for completing your Welcome to Medicare Visit or Medicare Annual Wellness Visit today  Your next wellness visit will be due in one year (6/18/2022)  The screening/preventive services that you may require over the next 5-10 years are detailed below  Some tests may not apply to you based off risk factors and/or age  Screening tests ordered at today's visit but not completed yet may show as past due  Also, please note that scanned in results may not display below  Preventive Screenings:  Service Recommendations Previous Testing/Comments   Colorectal Cancer Screening  * Colonoscopy    * Fecal Occult Blood Test (FOBT)/Fecal Immunochemical Test (FIT)  * Fecal DNA/Cologuard Test  * Flexible Sigmoidoscopy Age: 54-65 years old   Colonoscopy: every 10 years (may be performed more frequently if at higher risk)  OR  FOBT/FIT: every 1 year  OR  Cologuard: every 3 years  OR  Sigmoidoscopy: every 5 years  Screening may be recommended earlier than age 48 if at higher risk for colorectal cancer  Also, an individualized decision between you and your healthcare provider will decide whether screening between the ages of 74-80 would be appropriate  Colonoscopy: 12/05/2019  FOBT/FIT: Not on file  Cologuard: Not on file  Sigmoidoscopy: Not on file    Screening Current     Breast Cancer Screening Age: 36 years old  Frequency: every 1-2 years  Not required if history of left and right mastectomy Mammogram: 07/17/2018    Risks and Benefits Discussed  Due for Mammogram   Cervical Cancer Screening Between the ages of 21-29, pap smear recommended once every 3 years  Between the ages of 33-67, can perform pap smear with HPV co-testing every 5 years     Recommendations may differ for women with a history of total hysterectomy, cervical cancer, or abnormal pap smears in past  Pap Smear: Not on file    Screening Not Indicated  Risks and Benefits Discussed   Hepatitis C Screening Once for adults born between 1945 and 1965  More frequently in patients at high risk for Hepatitis C Hep C Antibody: 08/28/2020    Screening Current   Diabetes Screening 1-2 times per year if you're at risk for diabetes or have pre-diabetes Fasting glucose: 95 mg/dL   A1C: No results in last 5 years    Screening Current  Due for Blood Glucose   Cholesterol Screening Once every 5 years if you don't have a lipid disorder  May order more often based on risk factors  Lipid panel: 08/28/2020    Screening Current  Due for Lipid Panel     Other Preventive Screenings Covered by Medicare:  1  Abdominal Aortic Aneurysm (AAA) Screening: covered once if your at risk  You're considered to be at risk if you have a family history of AAA  2  Lung Cancer Screening: covers low dose CT scan once per year if you meet all of the following conditions: (1) Age 50-69; (2) No signs or symptoms of lung cancer; (3) Current smoker or have quit smoking within the last 15 years; (4) You have a tobacco smoking history of at least 30 pack years (packs per day multiplied by number of years you smoked); (5) You get a written order from a healthcare provider  3  Glaucoma Screening: covered annually if you're considered high risk: (1) You have diabetes OR (2) Family history of glaucoma OR (3)  aged 48 and older OR (3)  American aged 72 and older  3  Osteoporosis Screening: covered every 2 years if you meet one of the following conditions: (1) You're estrogen deficient and at risk for osteoporosis based off medical history and other findings; (2) Have a vertebral abnormality; (3) On glucocorticoid therapy for more than 3 months; (4) Have primary hyperparathyroidism; (5) On osteoporosis medications and need to assess response to drug therapy  · Last bone density test (DXA Scan): 09/18/2020   5  HIV Screening: covered annually if you're between the age of 15-65   Also covered annually if you are younger than 13 and older than 72 with risk factors for HIV infection  For pregnant patients, it is covered up to 3 times per pregnancy  Immunizations:  Immunization Recommendations   Influenza Vaccine Annual influenza vaccination during flu season is recommended for all persons aged >= 6 months who do not have contraindications   Pneumococcal Vaccine (Prevnar and Pneumovax)  * Prevnar = PCV13  * Pneumovax = PPSV23   Adults 25-60 years old: 1-3 doses may be recommended based on certain risk factors  Adults 72 years old: Prevnar (PCV13) vaccine recommended followed by Pneumovax (PPSV23) vaccine  If already received PPSV23 since turning 65, then PCV13 recommended at least one year after PPSV23 dose  Hepatitis B Vaccine 3 dose series if at intermediate or high risk (ex: diabetes, end stage renal disease, liver disease)   Tetanus (Td) Vaccine - COST NOT COVERED BY MEDICARE PART B Following completion of primary series, a booster dose should be given every 10 years to maintain immunity against tetanus  Td may also be given as tetanus wound prophylaxis  Tdap Vaccine - COST NOT COVERED BY MEDICARE PART B Recommended at least once for all adults  For pregnant patients, recommended with each pregnancy  Shingles Vaccine (Shingrix) - COST NOT COVERED BY MEDICARE PART B  2 shot series recommended in those aged 48 and above     Health Maintenance Due:      Topic Date Due    MAMMOGRAM  07/17/2019    DXA SCAN  09/01/2022    Colorectal Cancer Screening  12/05/2024    Hepatitis C Screening  Completed     Immunizations Due:      Topic Date Due    DTaP,Tdap,and Td Vaccines (1 - Tdap) Never done     Advance Directives   What are advance directives? Advance directives are legal documents that state your wishes and plans for medical care  These plans are made ahead of time in case you lose your ability to make decisions for yourself  Advance directives can apply to any medical decision, such as the treatments you want, and if you want to donate organs  What are the types of advance directives? There are many types of advance directives, and each state has rules about how to use them  You may choose a combination of any of the following:  · Living will: This is a written record of the treatment you want  You can also choose which treatments you do not want, which to limit, and which to stop at a certain time  This includes surgery, medicine, IV fluid, and tube feedings  · Durable power of  for healthcare [facility]): This is a written record that states who you want to make healthcare choices for you when you are unable to make them for yourself  This person, called a proxy, is usually a family member or a friend  You may choose more than 1 proxy  · Do not resuscitate (DNR) order:  A DNR order is used in case your heart stops beating or you stop breathing  It is a request not to have certain forms of treatment, such as CPR  A DNR order may be included in other types of advance directives  · Medical directive: This covers the care that you want if you are in a coma, near death, or unable to make decisions for yourself  You can list the treatments you want for each condition  Treatment may include pain medicine, surgery, blood transfusions, dialysis, IV or tube feedings, and a ventilator (breathing machine)  · Values history: This document has questions about your views, beliefs, and how you feel and think about life  This information can help others choose the care that you would choose  Why are advance directives important? An advance directive helps you control your care  Although spoken wishes may be used, it is better to have your wishes written down  Spoken wishes can be misunderstood, or not followed  Treatments may be given even if you do not want them  An advance directive may make it easier for your family to make difficult choices about your care     Weight Management   Why it is important to manage your weight:  Being overweight increases your risk of health conditions such as heart disease, high blood pressure, type 2 diabetes, and certain types of cancer  It can also increase your risk for osteoarthritis, sleep apnea, and other respiratory problems  Aim for a slow, steady weight loss  Even a small amount of weight loss can lower your risk of health problems  How to lose weight safely:  A safe and healthy way to lose weight is to eat fewer calories and get regular exercise  You can lose up about 1 pound a week by decreasing the number of calories you eat by 500 calories each day  Healthy meal plan for weight management:  A healthy meal plan includes a variety of foods, contains fewer calories, and helps you stay healthy  A healthy meal plan includes the following:  · Eat whole-grain foods more often  A healthy meal plan should contain fiber  Fiber is the part of grains, fruits, and vegetables that is not broken down by your body  Whole-grain foods are healthy and provide extra fiber in your diet  Some examples of whole-grain foods are whole-wheat breads and pastas, oatmeal, brown rice, and bulgur  · Eat a variety of vegetables every day  Include dark, leafy greens such as spinach, kale, nils greens, and mustard greens  Eat yellow and orange vegetables such as carrots, sweet potatoes, and winter squash  · Eat a variety of fruits every day  Choose fresh or canned fruit (canned in its own juice or light syrup) instead of juice  Fruit juice has very little or no fiber  · Eat low-fat dairy foods  Drink fat-free (skim) milk or 1% milk  Eat fat-free yogurt and low-fat cottage cheese  Try low-fat cheeses such as mozzarella and other reduced-fat cheeses  · Choose meat and other protein foods that are low in fat  Choose beans or other legumes such as split peas or lentils  Choose fish, skinless poultry (chicken or turkey), or lean cuts of red meat (beef or pork)  Before you cook meat or poultry, cut off any visible fat     · Use less fat and oil   Try baking foods instead of frying them  Add less fat, such as margarine, sour cream, regular salad dressing and mayonnaise to foods  Eat fewer high-fat foods  Some examples of high-fat foods include french fries, doughnuts, ice cream, and cakes  · Eat fewer sweets  Limit foods and drinks that are high in sugar  This includes candy, cookies, regular soda, and sweetened drinks  Exercise:  Exercise at least 30 minutes per day on most days of the week  Some examples of exercise include walking, biking, dancing, and swimming  You can also fit in more physical activity by taking the stairs instead of the elevator or parking farther away from stores  Ask your healthcare provider about the best exercise plan for you  © Copyright [employer] 2018 Information is for End User's use only and may not be sold, redistributed or otherwise used for commercial purposes   All illustrations and images included in CareNotes® are the copyrighted property of A D A M , [employer]  or [address] [place] [address]

## 2021-06-24 ENCOUNTER — APPOINTMENT (OUTPATIENT)
Dept: LAB | Facility: HOSPITAL | Age: 71
End: 2021-06-24
Payer: COMMERCIAL

## 2021-06-24 DIAGNOSIS — Z13.6 ENCOUNTER FOR LIPID SCREENING FOR CARDIOVASCULAR DISEASE: ICD-10-CM

## 2021-06-24 DIAGNOSIS — M81.0 AGE-RELATED OSTEOPOROSIS WITHOUT CURRENT PATHOLOGICAL FRACTURE: ICD-10-CM

## 2021-06-24 DIAGNOSIS — K21.9 GASTROESOPHAGEAL REFLUX DISEASE WITHOUT ESOPHAGITIS: ICD-10-CM

## 2021-06-24 DIAGNOSIS — Z13.220 ENCOUNTER FOR LIPID SCREENING FOR CARDIOVASCULAR DISEASE: ICD-10-CM

## 2021-06-24 DIAGNOSIS — E55.9 VITAMIN D DEFICIENCY: ICD-10-CM

## 2021-06-24 LAB
25(OH)D3 SERPL-MCNC: 51.9 NG/ML (ref 30–100)
ALBUMIN SERPL BCP-MCNC: 4.3 G/DL (ref 3.5–5)
ALP SERPL-CCNC: 100 U/L (ref 46–116)
ALT SERPL W P-5'-P-CCNC: 29 U/L (ref 12–78)
ANION GAP SERPL CALCULATED.3IONS-SCNC: 8 MMOL/L (ref 4–13)
AST SERPL W P-5'-P-CCNC: 28 U/L (ref 5–45)
BASOPHILS # BLD AUTO: 0.04 THOUSANDS/ΜL (ref 0–0.1)
BASOPHILS NFR BLD AUTO: 1 % (ref 0–1)
BILIRUB SERPL-MCNC: 0.66 MG/DL (ref 0.2–1)
BUN SERPL-MCNC: 11 MG/DL (ref 5–25)
CALCIUM SERPL-MCNC: 10.4 MG/DL (ref 8.3–10.1)
CHLORIDE SERPL-SCNC: 105 MMOL/L (ref 100–108)
CHOLEST SERPL-MCNC: 218 MG/DL (ref 50–200)
CO2 SERPL-SCNC: 29 MMOL/L (ref 21–32)
CREAT SERPL-MCNC: 0.9 MG/DL (ref 0.6–1.3)
EOSINOPHIL # BLD AUTO: 0.28 THOUSAND/ΜL (ref 0–0.61)
EOSINOPHIL NFR BLD AUTO: 7 % (ref 0–6)
ERYTHROCYTE [DISTWIDTH] IN BLOOD BY AUTOMATED COUNT: 13.9 % (ref 11.6–15.1)
GFR SERPL CREATININE-BSD FRML MDRD: 65 ML/MIN/1.73SQ M
GLUCOSE P FAST SERPL-MCNC: 93 MG/DL (ref 65–99)
HCT VFR BLD AUTO: 42.2 % (ref 34.8–46.1)
HDLC SERPL-MCNC: 59 MG/DL
HGB BLD-MCNC: 13.9 G/DL (ref 11.5–15.4)
IMM GRANULOCYTES # BLD AUTO: 0.01 THOUSAND/UL (ref 0–0.2)
IMM GRANULOCYTES NFR BLD AUTO: 0 % (ref 0–2)
LDLC SERPL CALC-MCNC: 125 MG/DL (ref 0–100)
LYMPHOCYTES # BLD AUTO: 1.12 THOUSANDS/ΜL (ref 0.6–4.47)
LYMPHOCYTES NFR BLD AUTO: 26 % (ref 14–44)
MCH RBC QN AUTO: 27.5 PG (ref 26.8–34.3)
MCHC RBC AUTO-ENTMCNC: 32.9 G/DL (ref 31.4–37.4)
MCV RBC AUTO: 84 FL (ref 82–98)
MONOCYTES # BLD AUTO: 0.43 THOUSAND/ΜL (ref 0.17–1.22)
MONOCYTES NFR BLD AUTO: 10 % (ref 4–12)
NEUTROPHILS # BLD AUTO: 2.44 THOUSANDS/ΜL (ref 1.85–7.62)
NEUTS SEG NFR BLD AUTO: 56 % (ref 43–75)
NONHDLC SERPL-MCNC: 159 MG/DL
NRBC BLD AUTO-RTO: 0 /100 WBCS
PLATELET # BLD AUTO: 255 THOUSANDS/UL (ref 149–390)
PMV BLD AUTO: 8.6 FL (ref 8.9–12.7)
POTASSIUM SERPL-SCNC: 4.8 MMOL/L (ref 3.5–5.3)
PROT SERPL-MCNC: 8.3 G/DL (ref 6.4–8.2)
RBC # BLD AUTO: 5.05 MILLION/UL (ref 3.81–5.12)
SODIUM SERPL-SCNC: 142 MMOL/L (ref 136–145)
TRIGL SERPL-MCNC: 171 MG/DL
TSH SERPL DL<=0.05 MIU/L-ACNC: 1.58 UIU/ML (ref 0.36–3.74)
WBC # BLD AUTO: 4.32 THOUSAND/UL (ref 4.31–10.16)

## 2021-06-24 PROCEDURE — 85025 COMPLETE CBC W/AUTO DIFF WBC: CPT

## 2021-06-24 PROCEDURE — 84443 ASSAY THYROID STIM HORMONE: CPT

## 2021-06-24 PROCEDURE — 36415 COLL VENOUS BLD VENIPUNCTURE: CPT

## 2021-06-24 PROCEDURE — 80061 LIPID PANEL: CPT | Performed by: NURSE PRACTITIONER

## 2021-06-24 PROCEDURE — 80053 COMPREHEN METABOLIC PANEL: CPT

## 2021-06-24 PROCEDURE — 82306 VITAMIN D 25 HYDROXY: CPT

## 2021-06-29 ENCOUNTER — TELEPHONE (OUTPATIENT)
Dept: FAMILY MEDICINE CLINIC | Facility: CLINIC | Age: 71
End: 2021-06-29

## 2021-06-29 NOTE — TELEPHONE ENCOUNTER
----- Message from [address] [place] [address] sent at 6/29/2021 12:02 PM EDT -----  Patient cholesterol slightly elevated but we do not need to treat  All ehr other labs were normal  Her vitamin d level is doing really well

## 2021-07-13 ENCOUNTER — LAB (OUTPATIENT)
Dept: LAB | Facility: HOSPITAL | Age: 71
End: 2021-07-13
Attending: UROLOGY
Payer: COMMERCIAL

## 2021-07-13 ENCOUNTER — TELEPHONE (OUTPATIENT)
Dept: UROLOGY | Facility: MEDICAL CENTER | Age: 71
End: 2021-07-13

## 2021-07-13 DIAGNOSIS — N39.0 RECURRENT UTI: Primary | ICD-10-CM

## 2021-07-13 PROCEDURE — 87077 CULTURE AEROBIC IDENTIFY: CPT

## 2021-07-13 PROCEDURE — 87186 SC STD MICRODIL/AGAR DIL: CPT

## 2021-07-13 PROCEDURE — 87086 URINE CULTURE/COLONY COUNT: CPT

## 2021-07-13 NOTE — TELEPHONE ENCOUNTER
Call placed to Pt's Daughter ans she states that Pt Urine is Cloudy and has a lot of bubbles  I will put in Urine CX for her  to go do at the Lab and will notify Pt with results

## 2021-07-13 NOTE — TELEPHONE ENCOUNTER
Patient of Dr Kristopher Handy  Patient has been taking keflex for last 6 months  Patient daughter states urine is cloudy and bubbly  Patient started to increase medication to 3 times a day in order to help with this issue  Patient not having any other symptoms at this time  Patient noticed cloudy urine on Saturday and on Sunday she increased medication

## 2021-07-15 ENCOUNTER — VBI (OUTPATIENT)
Dept: ADMINISTRATIVE | Facility: OTHER | Age: 71
End: 2021-07-15

## 2021-07-15 LAB — BACTERIA UR CULT: ABNORMAL

## 2021-07-15 RX ORDER — CEPHALEXIN 500 MG/1
500 CAPSULE ORAL EVERY 6 HOURS SCHEDULED
Qty: 28 CAPSULE | Refills: 0 | Status: SHIPPED | OUTPATIENT
Start: 2021-07-15 | End: 2021-08-31

## 2021-07-15 NOTE — TELEPHONE ENCOUNTER
Patient's daughter called stating patient did the urine culture and she would like to know the results  She stated if any comes up please leave a detail message due to her being at work and she does not get out until 430 pm and she is aware that is the time the office closes as well  She just wants to know if she needs to go to the pharmacy to  antibiotics if she has an infection

## 2021-07-15 NOTE — TELEPHONE ENCOUNTER
Daughter called and information given as per the encounter note    She verbalized understanding and she will  antibiotics for mom at the pharmacy

## 2021-07-19 ENCOUNTER — OFFICE VISIT (OUTPATIENT)
Dept: RHEUMATOLOGY | Facility: CLINIC | Age: 71
End: 2021-07-19
Payer: COMMERCIAL

## 2021-07-19 VITALS
DIASTOLIC BLOOD PRESSURE: 78 MMHG | HEART RATE: 53 BPM | SYSTOLIC BLOOD PRESSURE: 123 MMHG | WEIGHT: 142.8 LBS | BODY MASS INDEX: 33.05 KG/M2 | HEIGHT: 55 IN

## 2021-07-19 DIAGNOSIS — M81.0 AGE-RELATED OSTEOPOROSIS WITHOUT CURRENT PATHOLOGICAL FRACTURE: Primary | ICD-10-CM

## 2021-07-19 DIAGNOSIS — M80.08XS FRACTURE OF VERTEBRA DUE TO OSTEOPOROSIS, SEQUELA: ICD-10-CM

## 2021-07-19 PROCEDURE — 3008F BODY MASS INDEX DOCD: CPT | Performed by: INTERNAL MEDICINE

## 2021-07-19 PROCEDURE — 99204 OFFICE O/P NEW MOD 45 MIN: CPT | Performed by: INTERNAL MEDICINE

## 2021-07-19 PROCEDURE — 1036F TOBACCO NON-USER: CPT | Performed by: INTERNAL MEDICINE

## 2021-07-19 PROCEDURE — 1160F RVW MEDS BY RX/DR IN RCRD: CPT | Performed by: INTERNAL MEDICINE

## 2021-07-19 NOTE — PATIENT INSTRUCTIONS
Will get approval for IV Reclast infusions yearly  Take 2,000 units of Vit  D daily  Take 600mg of Ca daily    Return to clinic in 6 months    Osteoporosis   AMBULATORY CARE:   Osteoporosis  is a long-term medical condition that causes your bones to become weak, brittle, and more likely to fracture  Osteoporosis occurs when your body absorbs more bone than it makes  It is also caused by a lack of calcium and estrogen (female hormone)  Common symptoms include the following: You may not have any signs or symptoms  You may break a bone after a muscle strain, bump, or fall  A break usually occurs in the hip, spine, or wrist  A collapsed vertebra (bone in your spine) may cause severe back pain or loss of height from bent posture  Call your doctor if:   · You have severe pain  · You have increasing pain after a fall  · You have pain when you do your daily activities  · You have questions or concerns about your condition or care  Diagnosis of osteoporosis:   · Blood and urine tests  measure your calcium, vitamin D, and estrogen levels  · An x-ray or CT  may show thinned bones or a fracture  You may be given contrast liquid to help the bones show up better in the pictures  Tell the healthcare provider if you have ever had an allergic reaction to contrast liquid  Do not enter the MRI room with anything metal  Metal can cause serious injury  Tell the healthcare provider if you have any metal in or on your body  · A bone density test  compares your bone thickness with what is expected for someone of your age, gender, and ethnicity  Treatment for osteoporosis  may include medicines to prevent bone loss, build new bone, and increase estrogen  These medicines help prevent fractures and may be given as a pill or injection  Ask your healthcare provider for more information on these medicines  Prevent bone loss:   · Eat healthy foods that are high in calcium  This helps keep your bones strong   Good sources of calcium are milk, cheese, broccoli, tofu, almonds, and canned salmon and sardines  · Increase your vitamin D intake  Vitamin D is in fish oils, some vegetables, and fortified milk, cereal, and bread  Vitamin D is also formed in the skin when it is exposed to the sun  Ask your healthcare provider how much sunlight is safe for you  · Drink liquids as directed  Ask your healthcare provider how much liquid to drink each day and which liquids are best for you  Do not have alcohol or caffeine  They decrease bone mineral density, which can weaken your bones  · Exercise regularly  Ask your healthcare provider about the best exercise plan for you  Weight bearing exercise for 30 minutes, 3 times a week can help build and strengthen bone  · Do not smoke  Nicotine and other chemicals in cigarettes and cigars can cause lung damage  Ask your healthcare provider for information if you currently smoke and need help to quit  E-cigarettes or smokeless tobacco still contain nicotine  Talk to your healthcare provider before you use these products  · Go to physical therapy as directed  A physical therapist teaches you exercises to help improve movement and muscle strength  Follow up with your doctor as directed:  Write down your questions so you remember to ask them during your visits  © Copyright [address] Information is for End User's use only and may not be sold, redistributed or otherwise used for commercial purposes  All illustrations and images included in CareNotes® are the copyrighted property of [employer]  or [employer]   The above information is an  only  It is not intended as medical advice for individual conditions or treatments  Talk to your doctor, nurse or pharmacist before following any medical regimen to see if it is safe and effective for you

## 2021-07-19 NOTE — PROGRESS NOTES
Assessment and Plan:   Addy Jameson is a 72 y o   female who presents as a Rheumatology consult referred by her PCP Mary Tinsley for management of osteoporosis  Patient received one infusion of IV zoledronic acid (Reclast) by her previous rheumatologist, Dr Chana López, in 2019; she did not realize she was to continue getting it yearly for a total of 3 years  Patient had slight improvement on her latest DEXA scan compared to last one after receiving th IV Reclast infusion  Patient would like to complete the course of IV Reclast; will obtain prior authorization for another IV Reclast infusion as soon as possible, then another in 1 year to complete a total of 3 years of IV Reclast  This is the more conservative approach for management of her osteoporosis  Next DEXA scan is due in 9/2022  Take 2,000 units of Vit  D daily  Take 600mg of Ca daily    Plan:  Diagnoses and all orders for this visit:    Age-related osteoporosis without current pathological fracture  -     Ambulatory referral to Rheumatology    Fracture of vertebra due to osteoporosis, sequela    Follow-up plan: RTC in 6 months        HPI  Addy Jameson is a 72 y o   female who presents as a Rheumatology consult referred by her PCP Mary Tinsley for management of osteoporosis  She was first diagnosed with OP in 2018, was started on Ca and Vit  D  Saw [facility] rheumatologist Dr Chana López in 2019, received Reclast once; patient did not realize she was supposed to receive the medication yearly for another two years  Reviewed outside records; patient last saw Dr Chana López on 2/27/19  Admits to low back pain, gets tired easily, has history of at least 3 compression fracture in her lower thoracic spine  She takes Bone-Meal Ca and daily CA and Vit  D  In 2018, she had several UTIs  Review of Systems  Review of Systems   Constitutional: Negative for chills, fatigue, fever and unexpected weight change     HENT: Negative for mouth sores and trouble swallowing  Eyes: Negative for pain and visual disturbance  Respiratory: Negative for cough and shortness of breath  Cardiovascular: Negative for chest pain and leg swelling  Gastrointestinal: Negative for abdominal pain, blood in stool, constipation, diarrhea and nausea  Heartburn/indigestion   Musculoskeletal: Positive for back pain and neck pain  Negative for arthralgias, joint swelling and myalgias  Skin: Negative for color change and rash  Allergic/Immunologic:        Medication allergies   Neurological: Negative for weakness and numbness  Hematological: Negative for adenopathy  Psychiatric/Behavioral: Negative for sleep disturbance  Reviewed and agree  Allergies  Allergies   Allergen Reactions    Ciprofloxacin Shortness Of Breath    Sulfa Antibiotics Shortness Of Breath     Stress and rash      Nitrofurantoin Nausea Only    Other Nausea Only     macrobid       Home Medications    Current Outpatient Medications:     b complex vitamins tablet, Take 1 tablet by mouth daily, Disp: , Rfl:     Bone Meal-Vitamin D (BONE MEAL PO), Take by mouth, Disp: , Rfl:     calcium carbonate-vitamin D (OSCAL-D) 500 mg-200 units per tablet, Take 1 tablet by mouth daily with breakfast, Disp: , Rfl:     Collagen Hydrolysate POWD, by Does not apply route, Disp: , Rfl:     D-Mannose POWD, Take by mouth, Disp: , Rfl:     LYSINE PO, Take by mouth, Disp: , Rfl:     Multiple Vitamin (multivitamin) tablet, Take 1 tablet by mouth daily, Disp: , Rfl:     omega-3-acid ethyl esters (LOVAZA) 1 g capsule, Take 2 g by mouth 2 (two) times a day, Disp: , Rfl:     vitamin E 100 UNIT capsule, Take 100 Units by mouth daily, Disp: , Rfl:     cephalexin (KEFLEX) 250 mg capsule, Take 1 capsule (250 mg total) by mouth daily, Disp: 90 capsule, Rfl: 1    cephalexin (KEFLEX) 500 mg capsule, Take 1 capsule (500 mg total) by mouth every 6 (six) hours for 7 days (Patient not taking: Reported on 7/19/2021), Disp: 28 capsule, Rfl: 0    clindamycin (CLEOCIN) 150 mg capsule, , Disp: , Rfl:     omeprazole (PriLOSEC) 20 mg delayed release capsule, TAKE ONE CAPSULE BY MOUTH EVERY DAY, Disp: 90 capsule, Rfl: 1    Past Medical History  Past Medical History:   Diagnosis Date    Age-related osteoporosis without current pathological fracture 7/25/2018    Class 1 obesity in adult 3/26/2018    Gastroesophageal reflux disease without esophagitis 3/26/2018    Hernia, hiatal     Insomnia     Osteoporosis     Recurrent UTI 5/3/2018    Stroke [facility] [address] )     Ulcer, gastric, acute     Vitamin D deficiency        Past Surgical History   Past Surgical History:   Procedure Laterality Date    BREAST LUMPECTOMY Left 1997    benign    HERNIA REPAIR      umbilical hernia     TUBAL LIGATION         Family History    Family History   Problem Relation Age of Onset    Diabetes Mother     Heart disease Father     COPD Father     Prostate cancer Brother    sisters - osteoporosis      Social History  Occupation: used to work in [employer] 50 History     Substance and Sexual Activity   Alcohol Use No     Social History     Substance and Sexual Activity   Drug Use No     Social History     Tobacco Use   Smoking Status Never Smoker   Smokeless Tobacco Never Used   Tobacco Comment    exposed to heavy smoke and chemicals       Objective:  Vitals:    07/19/21 0903   BP: 123/78   BP Location: Left arm   Patient Position: Sitting   Cuff Size: Standard   Pulse: (!) 53   Weight: 64 8 kg (142 lb 12 8 oz)   Height: 4' 7" (1 397 m)       Physical Exam  Constitutional:       General: She is not in acute distress  Appearance: She is well-developed  HENT:      Head: Normocephalic and atraumatic  Eyes:      General: Lids are normal  No scleral icterus  Conjunctiva/sclera: Conjunctivae normal    Neck:      Thyroid: No thyromegaly  Cardiovascular:      Rate and Rhythm: Normal rate and regular rhythm        Heart sounds: S1 normal and S2 normal  No murmur heard  No friction rub  Pulmonary:      Effort: Pulmonary effort is normal  No tachypnea or respiratory distress  Breath sounds: Normal breath sounds  No wheezing, rhonchi or rales  Musculoskeletal:         General: Tenderness and deformity present  Cervical back: Neck supple  No muscular tenderness  Comments: Kyphosis; thoracic spine tenderness   Lymphadenopathy:      Head:      Right side of head: No submental or submandibular adenopathy  Left side of head: No submental or submandibular adenopathy  Cervical: No cervical adenopathy  Skin:     General: Skin is warm and dry  Findings: No rash  Nails: There is no clubbing  Neurological:      Mental Status: She is alert  Sensory: No sensory deficit  Psychiatric:         Behavior: Behavior normal  Behavior is cooperative  Reviewed labs and imaging  Imaging:   DEXA Scan 9/1/20  RESULTS:   LUMBAR SPINE:  L1-L4:  BMD 0 766 gm/cm2  T-score below normal, -2 6, osteoporosis, and 3% higher than 2018  Z-score -0 5     LEFT TOTAL HIP:  BMD 0 830 gm/cm2  T-score below normal, -1 0  Z-score 0 4     LEFT FEMORAL NECK:  BMD 0 654 gm/cm2  T-score below normal, -1 9 and 2% higher than 2018  Z-score -0 2     IMPRESSION:  1  Based on the [facility] classification, this study identifies a diagnosis of osteoporosis, notable at the spine area and the patient is considered at increased risk in particular for vertebral  fracture  Labs:   Telephone on 07/13/2021   Component Date Value Ref Range Status    Urine Culture 07/13/2021 >100,000 cfu/ml Klebsiella pneumoniae*  Final    Multi-Drug Resistant Organism  Please note: This patient requires contact precautions     Appointment on 06/24/2021   Component Date Value Ref Range Status    Vit D, 25-Hydroxy 06/24/2021 51 9  30 0 - 100 0 ng/mL Final    Sodium 06/24/2021 142  136 - 145 mmol/L Final    Potassium 06/24/2021 4 8  3 5 - 5 3 mmol/L Final    Chloride 06/24/2021 105  100 - 108 mmol/L Final    CO2 06/24/2021 29  21 - 32 mmol/L Final    ANION GAP 06/24/2021 8  4 - 13 mmol/L Final    BUN 06/24/2021 11  5 - 25 mg/dL Final    Creatinine 06/24/2021 0 90  0 60 - 1 30 mg/dL Final    Standardized to IDMS reference method    Glucose, Fasting 06/24/2021 93  65 - 99 mg/dL Final    Specimen collection should occur prior to Sulfasalazine administration due to the potential for falsely depressed results  Specimen collection should occur prior to Sulfapyridine administration due to the potential for falsely elevated results   Calcium 06/24/2021 10 4* 8 3 - 10 1 mg/dL Final    AST 06/24/2021 28  5 - 45 U/L Final    Specimen collection should occur prior to Sulfasalazine administration due to the potential for falsely depressed results   ALT 06/24/2021 29  12 - 78 U/L Final    Specimen collection should occur prior to Sulfasalazine administration due to the potential for falsely depressed results   Alkaline Phosphatase 06/24/2021 100  46 - 116 U/L Final    Total Protein 06/24/2021 8 3* 6 4 - 8 2 g/dL Final    Albumin 06/24/2021 4 3  3 5 - 5 0 g/dL Final    Total Bilirubin 06/24/2021 0 66  0 20 - 1 00 mg/dL Final    Use of this assay is not recommended for patients undergoing treatment with eltrombopag due to the potential for falsely elevated results      eGFR 06/24/2021 65  ml/min/1 73sq m Final    WBC 06/24/2021 4 32  4 31 - 10 16 Thousand/uL Final    RBC 06/24/2021 5 05  3 81 - 5 12 Million/uL Final    Hemoglobin 06/24/2021 13 9  11 5 - 15 4 g/dL Final    Hematocrit 06/24/2021 42 2  34 8 - 46 1 % Final    MCV 06/24/2021 84  82 - 98 fL Final    MCH 06/24/2021 27 5  26 8 - 34 3 pg Final    MCHC 06/24/2021 32 9  31 4 - 37 4 g/dL Final    RDW 06/24/2021 13 9  11 6 - 15 1 % Final    MPV 06/24/2021 8 6* 8 9 - 12 7 fL Final    Platelets 95/04/3154 255  149 - 390 Thousands/uL Final    nRBC 06/24/2021 0  /100 WBCs Final    Neutrophils Relative 06/24/2021 56  43 - 75 % Final    Immat GRANS % 06/24/2021 0  0 - 2 % Final    Lymphocytes Relative 06/24/2021 26  14 - 44 % Final    Monocytes Relative 06/24/2021 10  4 - 12 % Final    Eosinophils Relative 06/24/2021 7* 0 - 6 % Final    Basophils Relative 06/24/2021 1  0 - 1 % Final    Neutrophils Absolute 06/24/2021 2 44  1 85 - 7 62 Thousands/µL Final    Immature Grans Absolute 06/24/2021 0 01  0 00 - 0 20 Thousand/uL Final    Lymphocytes Absolute 06/24/2021 1 12  0 60 - 4 47 Thousands/µL Final    Monocytes Absolute 06/24/2021 0 43  0 17 - 1 22 Thousand/µL Final    Eosinophils Absolute 06/24/2021 0 28  0 00 - 0 61 Thousand/µL Final    Basophils Absolute 06/24/2021 0 04  0 00 - 0 10 Thousands/µL Final    TSH 3RD GENERATON 06/24/2021 1 576  0 358 - 3 740 uIU/mL Final    The recommended reference ranges for TSH during pregnancy are as follows:   First trimester 0 1 to 2 5 uIU/mL   Second trimester  0 2 to 3 0 uIU/mL   Third trimester 0 3 to 3 0 uIU/m    Note: Normal ranges may not apply to patients who are transgender, non-binary, or whose legal sex, sex at birth, and gender identity differ  Office Visit on 06/17/2021   Component Date Value Ref Range Status    Cholesterol 06/24/2021 218* 50 - 200 mg/dL Final    Cholesterol:       Desirable         <200 mg/dl       Borderline         200-239 mg/dl       High              >239           Triglycerides 06/24/2021 171* <=150 mg/dL Final    Triglyceride:     Normal          <150 mg/dl     Borderline High 150-199 mg/dl     High            200-499 mg/dl        Very High       >499 mg/dl    Specimen collection should occur prior to N-Acetylcysteine or Metamizole administration due to the potential for falsely depressed results   HDL, Direct 06/24/2021 59  >=40 mg/dL Final    HDL Cholesterol:       Low     <41 mg/dL  Specimen collection should occur prior to Metamizole administration due to the potential for falsley depressed results      LDL Calculated 06/24/2021 125* 0 - 100 mg/dL Final    LDL Cholesterol:     Optimal           <100 mg/dl     Near Optimal      100-129 mg/dl     Above Optimal       Borderline High 130-159 mg/dl       High            160-189 mg/dl       Very High       >189 mg/dl         This screening LDL is a calculated result  It does not have the accuracy of the Direct Measured LDL in the monitoring of patients with hyperlipidemia and/or statin therapy  Direct Measure LDL (GCC202) must be ordered separately in these patients      Non-HDL-Chol (CHOL-HDL) 06/24/2021 159  mg/dl Final   Procedure visit on 02/26/2021   Component Date Value Ref Range Status     COLOR,UA 02/26/2021 yellow   Final    CLARITY,UA 02/26/2021 clear   Final    SPECIFIC GRAVITY,UA 02/26/2021 1 010   Final     PH,UA 02/26/2021 7 0   Final    LEUKOCYTE ESTERASE,UA 02/26/2021 n   Final    NITRITE,UA 02/26/2021 n   Final    GLUCOSE, UA 02/26/2021 n   Final    KETONES,UA 02/26/2021 n   Final    BILIRUBIN,UA 02/26/2021 n   Final    BLOOD,UA 02/26/2021 tr   Final    POCT URINE PROTEIN 02/26/2021 n   Final    SL AMB POCT UROBILINOGEN 02/26/2021 0 2   Final

## 2021-07-21 DIAGNOSIS — K21.9 GASTROESOPHAGEAL REFLUX DISEASE WITHOUT ESOPHAGITIS: ICD-10-CM

## 2021-07-21 NOTE — TELEPHONE ENCOUNTER
Patient daughter Valentino Romp called and advised that she has only taken it 3 times a day on Saturday Sunday and Monday and not since  She advised that even taking it the 3 times a day it is really strong and is making her have a lot of nausea  Patient daughter would like to know if she can take the medication with food to help? Please advise

## 2021-07-21 NOTE — TELEPHONE ENCOUNTER
Spoke to pt's daughter  Arcelia Luis [phone] and verified pt is taking tetracycline for UTI called in by Dr Madie Mohs  She was told not to take Keflex while she is taking Tetracycyline  Pt's daughter stated pt is having trouble taking this med as directed (4 times daily)  It is causing nausea  Advised pt to contact pharmacist for recommendations on taking the med to prevent nausea  If she continues to be unable to tolerate it, she should call this office and may need to be switched to another antibiotic

## 2021-07-22 RX ORDER — OMEPRAZOLE 20 MG/1
CAPSULE, DELAYED RELEASE ORAL
Qty: 90 CAPSULE | Refills: 1 | Status: SHIPPED | OUTPATIENT
Start: 2021-07-22 | End: 2022-05-02 | Stop reason: SDUPTHER

## 2021-07-24 ENCOUNTER — TELEPHONE (OUTPATIENT)
Dept: RHEUMATOLOGY | Facility: CLINIC | Age: 71
End: 2021-07-24

## 2021-07-24 NOTE — TELEPHONE ENCOUNTER
Please work on prior auth for yearly IV Reclast infusions for patient's osteoporosis  Only received one dose so far in 2019  Needs two more yearly infusions to complete course  She has not tried any other osteoporosis management besides daily Ca and Vit  D  She had recent normal Ca and Vit  D levels  Let me know once approved so that I can put in the order for the [facility] infusion center

## 2021-07-26 DIAGNOSIS — M81.0 AGE-RELATED OSTEOPOROSIS WITHOUT CURRENT PATHOLOGICAL FRACTURE: Primary | ICD-10-CM

## 2021-07-26 RX ORDER — SODIUM CHLORIDE 9 MG/ML
20 INJECTION, SOLUTION INTRAVENOUS ONCE
Status: CANCELLED | OUTPATIENT
Start: 2021-08-05

## 2021-07-26 RX ORDER — ZOLEDRONIC ACID 5 MG/100ML
5 INJECTION, SOLUTION INTRAVENOUS ONCE
Status: CANCELLED | OUTPATIENT
Start: 2021-08-05

## 2021-07-26 NOTE — TELEPHONE ENCOUNTER
No authorization is required through [facility] via Jimmie joyner  Please enter the order and I'll get it scheduled

## 2021-07-27 NOTE — TELEPHONE ENCOUNTER
I spoke with [facility]  Reclast has been scheduled for 8/5/2021 @ 230pm      I left a detailed message notifying patient  I provided her with their phone number if this date/time is not good  I also asked for a confirmation call back that she did receive my message

## 2021-07-27 NOTE — TELEPHONE ENCOUNTER
I attempted to reach [facility], spoke with Sunny  She did not have a  readily available so she took my name/number to call me back  I provided my direct line

## 2021-07-28 ENCOUNTER — TELEPHONE (OUTPATIENT)
Dept: OBGYN CLINIC | Facility: HOSPITAL | Age: 71
End: 2021-07-28

## 2021-07-28 NOTE — TELEPHONE ENCOUNTER
Daniela Morejon called to let Dr Bal Ventura know that they got the message about the infusion auth and scheduling  They will call the infusion center to reschedule the appointment for a time that works better for them  Daniela Morejon just wanted to thank Alvin Manley and make sure you knew they did get your message

## 2021-07-29 NOTE — TELEPHONE ENCOUNTER
Received notification yesterday 7/28 they did receive my message and they will be contacting the infusion center to reschedule, as the date scheduled is not good

## 2021-08-13 ENCOUNTER — HOSPITAL ENCOUNTER (OUTPATIENT)
Dept: INFUSION CENTER | Facility: CLINIC | Age: 71
Discharge: HOME | End: 2021-08-13
Payer: COMMERCIAL

## 2021-08-13 VITALS
TEMPERATURE: 98.5 F | WEIGHT: 139.99 LBS | DIASTOLIC BLOOD PRESSURE: 79 MMHG | HEART RATE: 67 BPM | SYSTOLIC BLOOD PRESSURE: 133 MMHG | BODY MASS INDEX: 32.54 KG/M2 | RESPIRATION RATE: 18 BRPM

## 2021-08-13 DIAGNOSIS — M81.0 AGE-RELATED OSTEOPOROSIS WITHOUT CURRENT PATHOLOGICAL FRACTURE: Primary | ICD-10-CM

## 2021-08-13 PROCEDURE — 96374 THER/PROPH/DIAG INJ IV PUSH: CPT

## 2021-08-13 RX ORDER — ZOLEDRONIC ACID 5 MG/100ML
5 INJECTION, SOLUTION INTRAVENOUS ONCE
OUTPATIENT
Start: 2022-08-13

## 2021-08-13 RX ORDER — ZOLEDRONIC ACID 5 MG/100ML
5 INJECTION, SOLUTION INTRAVENOUS ONCE
Status: COMPLETED | OUTPATIENT
Start: 2021-08-13 | End: 2021-08-13

## 2021-08-13 RX ORDER — SODIUM CHLORIDE 9 MG/ML
20 INJECTION, SOLUTION INTRAVENOUS ONCE
OUTPATIENT
Start: 2022-08-13

## 2021-08-13 RX ORDER — SODIUM CHLORIDE 9 MG/ML
20 INJECTION, SOLUTION INTRAVENOUS ONCE
Status: COMPLETED | OUTPATIENT
Start: 2021-08-13 | End: 2021-08-13

## 2021-08-13 RX ADMIN — SODIUM CHLORIDE 20 ML/HR: 0.9 INJECTION, SOLUTION INTRAVENOUS at 12:30

## 2021-08-13 RX ADMIN — ZOLEDRONIC ACID 5 MG: 0.05 INJECTION, SOLUTION INTRAVENOUS at 12:41

## 2021-08-13 NOTE — PROGRESS NOTES
Patient tolerated Reclast infusion well with no complications  No future infusion appointments at this time  AVS provided

## 2021-08-17 ENCOUNTER — HOSPITAL ENCOUNTER (OUTPATIENT)
Dept: ULTRASOUND IMAGING | Facility: HOSPITAL | Age: 71
Discharge: HOME | End: 2021-08-17
Attending: UROLOGY
Payer: COMMERCIAL

## 2021-08-17 DIAGNOSIS — N13.30 HYDRONEPHROSIS OF RIGHT KIDNEY: ICD-10-CM

## 2021-08-17 DIAGNOSIS — N39.0 RECURRENT UTI: ICD-10-CM

## 2021-08-17 DIAGNOSIS — R33.9 INCOMPLETE BLADDER EMPTYING: ICD-10-CM

## 2021-08-17 DIAGNOSIS — M62.89 PELVIC FLOOR DYSFUNCTION: ICD-10-CM

## 2021-08-17 PROCEDURE — 76770 US EXAM ABDO BACK WALL COMP: CPT

## 2021-08-31 ENCOUNTER — OFFICE VISIT (OUTPATIENT)
Dept: UROLOGY | Facility: CLINIC | Age: 71
End: 2021-08-31
Payer: COMMERCIAL

## 2021-08-31 VITALS
DIASTOLIC BLOOD PRESSURE: 80 MMHG | BODY MASS INDEX: 33.33 KG/M2 | HEIGHT: 55 IN | SYSTOLIC BLOOD PRESSURE: 120 MMHG | WEIGHT: 144 LBS | HEART RATE: 63 BPM

## 2021-08-31 DIAGNOSIS — N13.39 OTHER HYDRONEPHROSIS: ICD-10-CM

## 2021-08-31 DIAGNOSIS — N39.0 RECURRENT UTI: ICD-10-CM

## 2021-08-31 DIAGNOSIS — R33.9 INCOMPLETE BLADDER EMPTYING: Primary | ICD-10-CM

## 2021-08-31 DIAGNOSIS — M62.89 PELVIC FLOOR DYSFUNCTION: ICD-10-CM

## 2021-08-31 PROCEDURE — 1160F RVW MEDS BY RX/DR IN RCRD: CPT | Performed by: NURSE PRACTITIONER

## 2021-08-31 PROCEDURE — 3008F BODY MASS INDEX DOCD: CPT | Performed by: NURSE PRACTITIONER

## 2021-08-31 PROCEDURE — 99213 OFFICE O/P EST LOW 20 MIN: CPT | Performed by: NURSE PRACTITIONER

## 2021-08-31 PROCEDURE — 1036F TOBACCO NON-USER: CPT | Performed by: NURSE PRACTITIONER

## 2021-08-31 RX ORDER — ESTRADIOL 0.1 MG/G
CREAM VAGINAL
Qty: 42.5 G | Refills: 10 | Status: SHIPPED | OUTPATIENT
Start: 2021-08-31 | End: 2022-04-29 | Stop reason: SDUPTHER

## 2021-08-31 NOTE — ASSESSMENT & PLAN NOTE
· Daily bowel regimen to prevent constipation   · Hydrate well with water   · Recommended double voiding   · Follow-up 1 year

## 2021-08-31 NOTE — PROGRESS NOTES
Assessment and plan:     Recurrent UTI  ·  take Cystex urinary health maintenance  ·  continue daily probiotic  ·  begin estrace cream twice a week  ·  call for UTI symptoms  ·  Follow up 6 months    Other hydronephrosis  ·  ultrasound kidney bladder  · Follow-up 1 year    Incomplete bladder emptying  · Daily bowel regimen to prevent constipation   · Hydrate well with water   · Recommended double voiding   · Follow-up 1 year    Pelvic floor dysfunction  ·  Referral made to pelvic floor physical therapy  ·  follow-up in 6 months for recheck        WAI Millard    History of Present Illness     Kam Corcoran is a 80 y o  Female presents for follow-up  She has a history of recurrent UTI  She underwent a cystoscopy with Dr Scarlett Bronson on 02/26/2021  She was to continue with Keflex for 6 months and have a renal ultrasound  It was felt she might have some pelvic floor dysfunction and shy bladder causing large bladder capacity and slow urinary stream  With incomplete bladder emptying  She was previously on VESIcare which was discontinued  Christi Cutting She was noted to have mild hydronephrosis with normal renal function  Reviewed her ultrasound which was stable and also reviewed by Dr Scarlett Bronson prior to today's visit  She does have an elevated PVR of 400 mL which is unchanged  We discussed pelvic floor physical therapy today and she is agreeable to try this out  Urine culture    07/13/2021 Klebsiella pneumoniae and multidrug resistant      10/01/2020 Morganella morganii    08/27/2020 beta-hemolytic strep group B    07/22/2020 beta-hemolytic strep group B      Laboratory     Lab Results   Component Value Date    BUN 11 06/24/2021    CREATININE 0 90 06/24/2021       No components found for: GFR    Lab Results   Component Value Date    CALCIUM 10 4 (H) 06/24/2021    K 4 8 06/24/2021    CO2 29 06/24/2021     06/24/2021       Lab Results   Component Value Date    WBC 4 32 06/24/2021    HGB 13 9 06/24/2021    HCT 42 2 06/24/2021    MCV 84 06/24/2021     06/24/2021       No results found for: PSA    No results found for this or any previous visit (from the past 1 hour(s))  @RESULT(URINEMICROSCOPIC)@    @RESULT(URINECULTURE)@    Radiology     RENAL ULTRASOUND  08/17/2021     INDICATION:   N39 0: Urinary tract infection, site not specified  N13 30: Unspecified hydronephrosis  R33 9: Retention of urine, unspecified  M62 89: Other specified disorders of muscle      COMPARISON: None     TECHNIQUE:   Ultrasound of the retroperitoneum was performed with a curvilinear transducer utilizing volumetric sweeps and still imaging techniques       FINDINGS:     KIDNEYS:  Symmetric and normal size  Right kidney:  9 8 x 4 7 x 5 2 cm  Left kidney:  9 6 x 4 1 x 4 4 cm      Right kidney  Normal echogenicity and contour  No suspicious masses detected  No hydronephrosis  Prominent extrarenal pelvis  No shadowing calculi  No perinephric fluid collections      Left kidney  Normal echogenicity and contour  No suspicious masses detected  No hydronephrosis  Prominent extrarenal pelvis  No shadowing calculi  No perinephric fluid collections      URETERS:  Nonvisualized      BLADDER:   Normally distended  No focal thickening or mass lesions  Bilateral ureteral jets detected  Prevoid bladder volume of 702 ML with post void volume of 400 mL     IMPRESSION:  No hydronephrosis  Significant post void residual of 400 mL with floating debris     Review of Systems     Review of Systems   Constitutional: Negative for activity change, appetite change, chills, fatigue, fever and unexpected weight change  HENT: Negative for facial swelling  Eyes: Negative for discharge  Respiratory: Negative  Negative for cough and shortness of breath  Cardiovascular: Negative for chest pain and leg swelling  Gastrointestinal: Negative  Negative for abdominal distention, abdominal pain, constipation, diarrhea, nausea and vomiting     Endocrine: Negative  Genitourinary: Negative  Negative for decreased urine volume, difficulty urinating, dysuria, enuresis, flank pain, frequency, genital sores, hematuria and urgency  Musculoskeletal: Negative for back pain and myalgias  Skin: Negative for pallor and rash  Allergic/Immunologic: Negative  Negative for immunocompromised state  Neurological: Negative for facial asymmetry and speech difficulty  Psychiatric/Behavioral: Negative for agitation and confusion  Allergies     Allergies   Allergen Reactions    Ciprofloxacin Shortness Of Breath    Sulfa Antibiotics Shortness Of Breath     Stress and rash   Nitrofurantoin Nausea Only    Other Nausea Only     macrobid       Physical Exam     Physical Exam  Constitutional:       General: She is not in acute distress  Appearance: Normal appearance  She is normal weight  She is not ill-appearing, toxic-appearing or diaphoretic  HENT:      Head: Normocephalic and atraumatic  Eyes:      General: No scleral icterus  Cardiovascular:      Rate and Rhythm: Normal rate  Pulmonary:      Effort: Pulmonary effort is normal  No respiratory distress  Abdominal:      General: Abdomen is flat  There is no distension  Palpations: Abdomen is soft  Tenderness: There is no abdominal tenderness  There is no guarding or rebound  Musculoskeletal:         General: No swelling  Cervical back: Normal range of motion  Skin:     General: Skin is warm and dry  Coloration: Skin is not jaundiced or pale  Findings: No rash  Neurological:      General: No focal deficit present  Mental Status: She is alert and oriented to person, place, and time  Gait: Gait normal    Psychiatric:         Mood and Affect: Mood normal          Behavior: Behavior normal          Thought Content:  Thought content normal          Judgment: Judgment normal          Vital Signs     Vitals:    08/31/21 1118   BP: 120/80   Pulse: 63   Weight: 65 3 kg (144 lb)   Height: 4' 7" (1 397 m)       Current Medications       Current Outpatient Medications:     b complex vitamins tablet, Take 1 tablet by mouth daily, Disp: , Rfl:     Bone Meal-Vitamin D (BONE MEAL PO), Take by mouth, Disp: , Rfl:     calcium carbonate-vitamin D (OSCAL-D) 500 mg-200 units per tablet, Take 1 tablet by mouth daily with breakfast, Disp: , Rfl:     Collagen Hydrolysate POWD, by Does not apply route, Disp: , Rfl:     D-Mannose POWD, Take by mouth, Disp: , Rfl:     Multiple Vitamin (multivitamin) tablet, Take 1 tablet by mouth daily, Disp: , Rfl:     omega-3-acid ethyl esters (LOVAZA) 1 g capsule, Take 2 g by mouth 2 (two) times a day, Disp: , Rfl:     omeprazole (PriLOSEC) 20 mg delayed release capsule, TAKE ONE CAPSULE BY MOUTH EVERY DAY, Disp: 90 capsule, Rfl: 1    estradiol (ESTRACE) 0 1 mg/g vaginal cream, Apply a pea-sized amount to your finger and placed around the urethral opening and around the vaginal opening twice a week on Wednesday and Sunday, Disp: 42 5 g, Rfl: 10    vitamin E 100 UNIT capsule, Take 100 Units by mouth daily, Disp: , Rfl:     Active Problems     Patient Active Problem List   Diagnosis    Gastroesophageal reflux disease without esophagitis    Primary insomnia    Class 1 obesity in adult    Alopecia areata    Recurrent UTI    Incidental lung nodule, > 3mm and < 8mm    Loss of height    Post-menopausal    Age-related osteoporosis without current pathological fracture    Vitamin D deficiency    Hilar adenopathy    Bilirubin in urine    Proteinuria    OAB (overactive bladder)    Other hydronephrosis    Incomplete bladder emptying    Pelvic floor dysfunction       Past Medical History     Past Medical History:   Diagnosis Date    Age-related osteoporosis without current pathological fracture 7/25/2018    Class 1 obesity in adult 3/26/2018    Gastroesophageal reflux disease without esophagitis 3/26/2018    Hernia, hiatal     Insomnia     Osteoporosis     Recurrent UTI 5/3/2018    Stroke ([facility] [address] )     Ulcer, gastric, acute     Vitamin D deficiency        Surgical History     Past Surgical History:   Procedure Laterality Date    BREAST LUMPECTOMY Left 1997    benign    HERNIA REPAIR      umbilical hernia     TUBAL LIGATION         Family History     Family History   Problem Relation Age of Onset    Diabetes Mother     Heart disease Father     COPD Father     Prostate cancer Brother        Social History     Social History     Social History     Tobacco Use   Smoking Status Never Smoker   Smokeless Tobacco Never Used   Tobacco Comment    exposed to heavy smoke and chemicals       Past Surgical History:   Procedure Laterality Date    BREAST LUMPECTOMY Left 1997    benign    HERNIA REPAIR      umbilical hernia     TUBAL LIGATION           The following portions of the patient's history were reviewed and updated as appropriate: allergies, current medications, past family history, past medical history, past social history, past surgical history and problem list    Please note :  Voice dictation software has been used to create this document  There may be inadvertent transcription errors      [id number] [address]

## 2021-08-31 NOTE — ASSESSMENT & PLAN NOTE
·  take Cystex urinary health maintenance  ·  continue daily probiotic  ·  begin estrace cream twice a week  ·  call for UTI symptoms  ·  Follow up 6 months

## 2021-08-31 NOTE — PATIENT INSTRUCTIONS
Recommend cystex urinary health maintenance  Continue daily probiotic  Call for any urinary infection symptoms  Daily bowel regimen to prevent constipation  Make sure hydrating well with water    Infecção do trato urinário em idosos   O QUE VOCÊ PRECISA SABER:   A infecção do trato urinário (ITU) é causada por bactérias que entram no trato urinário  O trato urinário inclui os rins, os ureteres, a bexiga e a uretra  A urina é produzida nos rins e flui dos ureteres para a bexiga  A urina awa da bexiga pela uretra  A ITU é mais comum no trato urinário inferior, que inclui a bexiga e a uretra  INSTRUÇÕES APÓS A SOREN:   Volte à emergência se:  · Você estiver urinando muito pouco ou nada  · Você estiver vomitando  · Você tiver febre soren com calafrios que fazem tremer  · Você sentir mais alisha liliana nacho ou liliana laterais  Ligue para o seu cuidador se:  · Você tiver febre  · Você for chencho mulher e tiver um [employer] da secreção branca ou amarela na vagina  · Você não se sentir melhor após 2 bazan tomando antibiótico     · Você tiver dúvidas ou preocupações quanto ao seu problema de saúde ou manjit lidar com bethanie  Medicamentos:  · Medicamentos ajudam a tratar a infecção bacteriana ou diminuir a alisha e a ardência quando você urina  Você também pode precisar de medicamentos para diminuir a vontade de urinar frequentemente  Se você tiver ITUs frequentes (denominadas ITUs recorrentes), você poderá receber antibióticos para fernandez regularmente  Você receberá instruções sobre quando e manjit usar antibióticos  O objetivo é prevenir as ITUs, mas não causar resistência aos antibióticos, utilizando antibióticos com muita frequência  · [place] seu medicamento conforme orientado  Entre em contato com o seu médico se achar que o medicamento não está ajudando ou se você apresentar efeitos colaterais  Informe a bethanie se você for alérgico a algum medicamento   [place] chencho lista de todos os medicamentos, vitaminas e Molly Raymond (fitoterápicos) que você fatou  Inclua a quantidade, quando e por que os [place]  Leve a lista ou os frascos de comprimidos às consultas de acompanhamento  Leve sua lista de medicamentos consigo em ari de emergência  Autocuidado:  · Kary líquidos conforme orientado  Os líquidos podem ajudar a eliminar as bactérias do trato urinário  Pergunte quanto de líquido precisa por radha e quais líquidos são melhores para você  Você pode precisar de beber mais líquidos do que o habitual para ajudar a Carmel Hernandez as bactérias  Não kary álcool, bebidas com cafeína e sucos cítricos  Eles podem irritar a bexiga e aumentar os sintomas  · Aplique calor no abdome por 20 a 30 minutos a cada duas horas por alguns bazan, conforme orientado  O calor ajuda a diminuir o desconforto e a pressão na bexiga  Evite chencho ITU:  · Urine quando você sentir vontade  Não prenda a urina  As bactérias podem crescer se a urina permanecer na bexiga por muito tempo  Pode ser útil urinar pelo menos a cada 3 a 4 horas  · Urine depois que tiver relações sexuais para eliminar as bactérias que podem entrar no seu trato urinário didier a relação  · Use roupas íntimas de algodão e roupas soltas  Calças apertadas e roupas íntimas de nylon podem bloquear a umidade e provocar o crescimento de bactérias  · O suco de cranberry ou os suplementos de cranberry podem ajudar a prevenir as ITUs  Seu médico pode recomendar o suco ou suplemento certo para você  · As mulheres devem se limpar da frente para trás após urinar ou evacuar  Isso pode evitar que germes entrem no trato urinário  Não use ducha higiênica ou desodorantes femininos  Eles podem alterar o equilíbrio químico da vagina  Você também pode receber medicamento de estrogênio vaginal  Gina medicamento ajuda a prevenir ITUs recorrentes em mulheres que passaram pela menopausa ou estão na perimenopausa      Faça o acompanhamento com seu médico conforme orientado: Anote as dúvidas que você tiver para se felicia baires sobre karsten velásquez as consultas  © Copyright [employer] 2021 Information is for End User's use only and may not be sold, redistributed or otherwise used for commercial purposes  All illustrations and images included in CareNotes® are the copyrighted property of [employer]  or Fe Gutierrez  The above information is an  only  It is not intended as medical advice for individual conditions or treatments  Talk to your doctor, nurse or pharmacist before following any medical regimen to see if it is safe and effective for you

## 2021-09-17 ENCOUNTER — VBI (OUTPATIENT)
Dept: ADMINISTRATIVE | Facility: OTHER | Age: 71
End: 2021-09-17

## 2021-10-13 ENCOUNTER — VBI (OUTPATIENT)
Dept: ADMINISTRATIVE | Facility: OTHER | Age: 71
End: 2021-10-13

## 2021-10-15 ENCOUNTER — APPOINTMENT (OUTPATIENT)
Dept: LAB | Facility: HOSPITAL | Age: 71
End: 2021-10-15
Attending: UROLOGY
Payer: COMMERCIAL

## 2021-10-15 ENCOUNTER — TELEPHONE (OUTPATIENT)
Dept: UROLOGY | Facility: AMBULATORY SURGERY CENTER | Age: 71
End: 2021-10-15

## 2021-10-15 DIAGNOSIS — N39.0 RECURRENT UTI: Primary | ICD-10-CM

## 2021-10-15 LAB
BACTERIA UR QL AUTO: ABNORMAL /HPF
BILIRUB UR QL STRIP: NEGATIVE
CLARITY UR: ABNORMAL
COLOR UR: YELLOW
GLUCOSE UR STRIP-MCNC: NEGATIVE MG/DL
HGB UR QL STRIP.AUTO: ABNORMAL
KETONES UR STRIP-MCNC: NEGATIVE MG/DL
LEUKOCYTE ESTERASE UR QL STRIP: ABNORMAL
NITRITE UR QL STRIP: NEGATIVE
NON-SQ EPI CELLS URNS QL MICRO: ABNORMAL /HPF
OTHER STN SPEC: ABNORMAL
PH UR STRIP.AUTO: 6.5 [PH]
PROT UR STRIP-MCNC: NEGATIVE MG/DL
RBC #/AREA URNS AUTO: ABNORMAL /HPF
SP GR UR STRIP.AUTO: <=1.005 (ref 1–1.03)
UROBILINOGEN UR QL STRIP.AUTO: 0.2 E.U./DL
WBC #/AREA URNS AUTO: ABNORMAL /HPF

## 2021-10-15 PROCEDURE — 81001 URINALYSIS AUTO W/SCOPE: CPT

## 2021-10-15 PROCEDURE — 87147 CULTURE TYPE IMMUNOLOGIC: CPT

## 2021-10-15 PROCEDURE — 87086 URINE CULTURE/COLONY COUNT: CPT

## 2021-10-16 LAB — BACTERIA UR CULT: ABNORMAL

## 2021-11-22 ENCOUNTER — VBI (OUTPATIENT)
Dept: ADMINISTRATIVE | Facility: OTHER | Age: 71
End: 2021-11-22

## 2021-12-27 ENCOUNTER — VBI (OUTPATIENT)
Dept: ADMINISTRATIVE | Facility: OTHER | Age: 71
End: 2021-12-27

## 2022-03-11 ENCOUNTER — TELEPHONE (OUTPATIENT)
Dept: UROLOGY | Facility: AMBULATORY SURGERY CENTER | Age: 72
End: 2022-03-11

## 2022-03-11 NOTE — TELEPHONE ENCOUNTER
Pt daughter David Kirkland called mother is starting with symptoms of UTI  Pt stated she is having burning low grade fever and pressure when urinating      Pt call [facility]-[phone] daughter cell phone

## 2022-03-12 ENCOUNTER — HOSPITAL ENCOUNTER (EMERGENCY)
Facility: HOSPITAL | Age: 72
Discharge: HOME | End: 2022-03-12
Attending: EMERGENCY MEDICINE | Admitting: EMERGENCY MEDICINE
Payer: COMMERCIAL

## 2022-03-12 VITALS
RESPIRATION RATE: 18 BRPM | OXYGEN SATURATION: 96 % | BODY MASS INDEX: 33.47 KG/M2 | HEIGHT: 55 IN | SYSTOLIC BLOOD PRESSURE: 153 MMHG | DIASTOLIC BLOOD PRESSURE: 71 MMHG | TEMPERATURE: 98.4 F | HEART RATE: 62 BPM

## 2022-03-12 DIAGNOSIS — N39.0 UTI (URINARY TRACT INFECTION): Primary | ICD-10-CM

## 2022-03-12 LAB
BACTERIA UR QL AUTO: ABNORMAL /HPF
BILIRUB UR QL STRIP: NEGATIVE
CLARITY UR: ABNORMAL
COLOR UR: YELLOW
GLUCOSE UR STRIP-MCNC: NEGATIVE MG/DL
HGB UR QL STRIP.AUTO: ABNORMAL
KETONES UR STRIP-MCNC: NEGATIVE MG/DL
LEUKOCYTE ESTERASE UR QL STRIP: ABNORMAL
NITRITE UR QL STRIP: NEGATIVE
NON-SQ EPI CELLS URNS QL MICRO: ABNORMAL /HPF
OTHER STN SPEC: ABNORMAL
PH UR STRIP.AUTO: 7 [PH] (ref 4.5–8)
PROT UR STRIP-MCNC: >=300 MG/DL
RBC #/AREA URNS AUTO: ABNORMAL /HPF
SP GR UR STRIP.AUTO: 1.02 (ref 1–1.03)
UROBILINOGEN UR QL STRIP.AUTO: 0.2 E.U./DL
WBC #/AREA URNS AUTO: ABNORMAL /HPF

## 2022-03-12 PROCEDURE — 81001 URINALYSIS AUTO W/SCOPE: CPT

## 2022-03-12 PROCEDURE — 87147 CULTURE TYPE IMMUNOLOGIC: CPT

## 2022-03-12 PROCEDURE — 99283 EMERGENCY DEPT VISIT LOW MDM: CPT

## 2022-03-12 PROCEDURE — 87086 URINE CULTURE/COLONY COUNT: CPT

## 2022-03-12 PROCEDURE — 99284 EMERGENCY DEPT VISIT MOD MDM: CPT | Performed by: EMERGENCY MEDICINE

## 2022-03-12 RX ORDER — TETRACYCLINE HYDROCHLORIDE 500 MG/1
500 CAPSULE ORAL 2 TIMES DAILY
Qty: 14 CAPSULE | Refills: 0 | Status: SHIPPED | OUTPATIENT
Start: 2022-03-12 | End: 2022-03-12 | Stop reason: SDUPTHER

## 2022-03-12 RX ORDER — TETRACYCLINE HYDROCHLORIDE 500 MG/1
500 CAPSULE ORAL 2 TIMES DAILY
Qty: 14 CAPSULE | Refills: 0 | Status: SHIPPED | OUTPATIENT
Start: 2022-03-12 | End: 2022-03-19

## 2022-03-12 NOTE — ED ATTENDING ATTESTATION
3/12/2022  I, Prasad Bynum MD, saw and evaluated the patient  I have discussed the patient with the resident/non-physician practitioner and agree with the resident's/non-physician practitioner's findings, Plan of Care, and MDM as documented in the resident's/non-physician practitioner's note, except where noted  All available labs and Radiology studies were reviewed  I was present for key portions of any procedure(s) performed by the resident/non-physician practitioner and I was immediately available to provide assistance  At this point I agree with the current assessment done in the Emergency Department  I have conducted an independent evaluation of this patient a history and physical is as follows:  Frequent UTIs and has been following up with urology although cancelled last appointment  H/O MDRO in urine and has multiple allergies  C/O dysuria and frequency  Had some chills  Didn't take temp  No flank pain or n/v  Afebrile here with normal vitals  ABD exam benign  Check urine   Tetracycline has worked in the past       ED Course         Critical Care Time  Procedures

## 2022-03-13 LAB — BACTERIA UR CULT: ABNORMAL

## 2022-03-13 NOTE — ED PROVIDER NOTES
History  Chief Complaint   Patient presents with    Possible UTI     pt reports, burning and frequent urination, denies back pain  73-year-old female with history of multiple UTIs in the past, previously seen by Dr Linda Lezama, presenting for evaluation of burning with urination that started today  Patient states that yesterday she did have some chills which have resolved today  Did not check her temperature but did take Tylenol which improved or chills  She woke up this morning noted that when she urinated it was painful to do so, felt like a burning sensation  She also notes that her urine looked bubbly  No blood in her urine  Denies any back pain  Does have some urgency and frequency as well  Feels like a typical UTI for her  No fevers or chills today  She has not had any chest pain, shortness of breath, coughing, runny nose, sore throat, no other known sick exposures  Denies any abdominal pain  No nausea/vomiting  States that she was supposed to see urology for follow-up appointment but canceled it because she has not had a UTI since December  History provided by:  Patient   used: No        Prior to Admission Medications   Prescriptions Last Dose Informant Patient Reported? Taking?    Bone Meal-Vitamin D (BONE MEAL PO)  Self Yes No   Sig: Take by mouth   Collagen Hydrolysate POWD  Self Yes No   Sig: by Does not apply route   D-Mannose POWD  Self Yes No   Sig: Take by mouth   Multiple Vitamin (multivitamin) tablet   Yes No   Sig: Take 1 tablet by mouth daily   b complex vitamins tablet  Self Yes No   Sig: Take 1 tablet by mouth daily   calcium carbonate-vitamin D (OSCAL-D) 500 mg-200 units per tablet  Self Yes No   Sig: Take 1 tablet by mouth daily with breakfast   estradiol (ESTRACE) 0 1 mg/g vaginal cream   No No   Sig: Apply a pea-sized amount to your finger and placed around the urethral opening and around the vaginal opening twice a week on Wednesday and Sunday omega-3-acid ethyl esters (LOVAZA) 1 g capsule  Self Yes No   Sig: Take 2 g by mouth 2 (two) times a day   omeprazole (PriLOSEC) 20 mg delayed release capsule   No No   Sig: TAKE ONE CAPSULE BY MOUTH EVERY DAY   vitamin E 100 UNIT capsule  Self Yes No   Sig: Take 100 Units by mouth daily      Facility-Administered Medications: None       Past Medical History:   Diagnosis Date    Age-related osteoporosis without current pathological fracture 7/25/2018    Class 1 obesity in adult 3/26/2018    Gastroesophageal reflux disease without esophagitis 3/26/2018    Hernia, hiatal     Insomnia     Osteoporosis     Recurrent UTI 5/3/2018    [facility]     Ulcer, gastric, acute     Vitamin D deficiency        Past Surgical History:   Procedure Laterality Date    BREAST LUMPECTOMY Left 1997    benign    HERNIA REPAIR      umbilical hernia     TUBAL LIGATION         Family History   Problem Relation Age of Onset    Diabetes Mother     Heart disease Father     COPD Father     Prostate cancer Brother      I have reviewed and agree with the history as documented  E-Cigarette/Vaping     E-Cigarette/Vaping Substances     Social History     Tobacco Use    Smoking status: Never Smoker    Smokeless tobacco: Never Used    Tobacco comment: exposed to heavy smoke and chemicals   Substance Use Topics    Alcohol use: No    Drug use: No        Review of Systems   Constitutional: Negative for chills and fever  HENT: Negative for ear pain and sore throat  Eyes: Negative for pain and visual disturbance  Respiratory: Negative for cough and shortness of breath  Cardiovascular: Negative for chest pain and palpitations  Gastrointestinal: Negative for abdominal pain, diarrhea, nausea and vomiting  Genitourinary: Positive for dysuria and frequency  Negative for hematuria  Musculoskeletal: Negative for arthralgias and back pain  Skin: Negative for color change and rash     Neurological: Negative for seizures and syncope  Psychiatric/Behavioral: Negative for confusion  The patient is not nervous/anxious  All other systems reviewed and are negative  Physical Exam  ED Triage Vitals [03/12/22 1450]   Temperature Pulse Respirations Blood Pressure SpO2   98 4 °F (36 9 °C) 62 18 153/71 96 %      Temp Source Heart Rate Source Patient Position - Orthostatic VS BP Location FiO2 (%)   Oral Monitor Sitting Right arm --      Pain Score       --             Orthostatic Vital Signs  Vitals:    03/12/22 1450   BP: 153/71   Pulse: 62   Patient Position - Orthostatic VS: Sitting       Physical Exam  Vitals and nursing note reviewed  Constitutional:       General: She is not in acute distress  Appearance: Normal appearance  She is well-developed  She is not ill-appearing or diaphoretic  HENT:      Head: Normocephalic and atraumatic  Right Ear: External ear normal       Left Ear: External ear normal       Nose: Nose normal       Mouth/Throat:      Mouth: Mucous membranes are moist    Eyes:      Conjunctiva/sclera: Conjunctivae normal    Cardiovascular:      Rate and Rhythm: Normal rate and regular rhythm  Heart sounds: No murmur heard  Pulmonary:      Effort: Pulmonary effort is normal  No respiratory distress  Breath sounds: Normal breath sounds  No wheezing  Abdominal:      General: Abdomen is flat  There is no distension  Palpations: Abdomen is soft  Tenderness: There is no abdominal tenderness  Comments: No suprapubic tenderness  No CVA tenderness  Musculoskeletal:         General: Normal range of motion  Cervical back: Normal range of motion and neck supple  Right lower leg: No edema  Left lower leg: No edema  Skin:     General: Skin is warm and dry  Neurological:      General: No focal deficit present  Mental Status: She is alert     Psychiatric:         Mood and Affect: Mood normal          ED Medications  Medications - No data to display    Diagnostic Studies  Results Reviewed     Procedure Component Value Units Date/Time    Urine Microscopic [[id number]]  (Abnormal) Collected: 03/12/22 1507    Lab Status: Final result Specimen: Urine, Clean Catch Updated: 03/12/22 1533     RBC, UA 30-50 /hpf      WBC, UA Innumerable /hpf      Epithelial Cells Occasional /hpf      Bacteria, UA Occasional /hpf      OTHER OBSERVATIONS WBCs Clumped    Urine culture [[id number]] Collected: 03/12/22 1507    Lab Status: In process Specimen: Urine, Clean Catch Updated: 03/12/22 1532    Urine Macroscopic, POC [[id number]]  (Abnormal) Collected: 03/12/22 1507    Lab Status: Final result Specimen: Urine Updated: 03/12/22 1509     Color, UA Yellow     Clarity, UA Cloudy     pH, UA 7 0     Leukocytes, UA Large     Nitrite, UA Negative     Protein, UA >=300 mg/dl      Glucose, UA Negative mg/dl      Ketones, UA Negative mg/dl      Urobilinogen, UA 0 2 E U /dl      Bilirubin, UA Negative     Blood, UA Large     Specific Oto, UA 1 020    Narrative:      CLINITEK RESULT                 No orders to display         Procedures  Procedures      ED Course                             SBIRT 22yo+      Most Recent Value   SBIRT (22 yo +)    In order to provide better care to our patients, we are screening all of our patients for alcohol and drug use  Would it be okay to ask you these screening questions? Yes Filed at: 03/12/2022 1512   Initial Alcohol Screen: US AUDIT-C     1  How often do you have a drink containing alcohol? 0 Filed at: 03/12/2022 1512   2  How many drinks containing alcohol do you have on a typical day you are drinking? 0 Filed at: 03/12/2022 1512   3b  FEMALE Any Age, or MALE 65+: How often do you have 4 or more drinks on one occassion? 0 Filed at: 03/12/2022 1512   Audit-C Score 0 Filed at: 03/12/2022 1512   RAY: How many times in the past year have you    Used an illegal drug or used a prescription medication for non-medical reasons?  Never Filed at: 03/12/2022 1512 MDM  Number of Diagnoses or Management Options  UTI (urinary tract infection)  Diagnosis management comments: 80year old female presenting for evaluation of UTI symptoms, history of multiple prior UTIs in the past   States that she is not ready kidney infection before  Is well-appearing on exam, normal vital signs, afebrile  No abdominal tenderness on exam, no CVA tenderness  Patient has been treated with tetracycline in the past   Will send urine culture and started tetracycline for 7 days  Recommended follow-up with Dr Mayank Jiang for continued evaluation  Discussed signs and symptoms of pyelonephritis, sepsis, as well as return precautions  Disposition  Final diagnoses:   UTI (urinary tract infection)     Time reflects when diagnosis was documented in both MDM as applicable and the Disposition within this note     Time User Action Codes Description Comment    3/12/2022  3:30 PM Shauna Cardona Add [N39 0] UTI (urinary tract infection)       ED Disposition     ED Disposition Condition Date/Time Comment    Discharge Good Sat Mar 12, 2022  3:30 PM Salud Mcguire discharge to home/self care  Follow-up Information     Follow up With Specialties Details Why Contact Info Additional [address] [facility], [address] [facility], Nurse Practitioner Schedule an appointment as soon as possible for a visit in 1 week As needed [address] [facility] [id number] 0049 [facility] Emergency Department Emergency Medicine Go to  As needed [facility] [id number]  [address] [facility] Emergency Department, [address] Myra Reyna , Hattie Lemons MD Urology Schedule an appointment as soon as possible for a visit in 1 month For reevaluation as we discussed   [address] [address]  [phone]             Discharge Medication List as of 3/12/2022  3:32 PM      START taking these medications    Details   tetracycline (ACHROMYCIN,SUMYCIN) 500 MG capsule Take 1 capsule (500 mg total) by mouth 2 (two) times a day for 7 days, Starting Sat 3/12/2022, Until Sat 3/19/2022, Normal         CONTINUE these medications which have NOT CHANGED    Details   b complex vitamins tablet Take 1 tablet by mouth daily, Historical Med      Bone Meal-Vitamin D (BONE MEAL PO) Take by mouth, Historical Med      calcium carbonate-vitamin D (OSCAL-D) 500 mg-200 units per tablet Take 1 tablet by mouth daily with breakfast, Historical Med      Collagen Hydrolysate POWD by Does not apply route, Historical Med      D-Mannose POWD Take by mouth, Historical Med      estradiol (ESTRACE) 0 1 mg/g vaginal cream Apply a pea-sized amount to your finger and placed around the urethral opening and around the vaginal opening twice a week on Wednesday and Sunday, Normal      Multiple Vitamin (multivitamin) tablet Take 1 tablet by mouth daily, Historical Med      omega-3-acid ethyl esters (LOVAZA) 1 g capsule Take 2 g by mouth 2 (two) times a day, Historical Med      omeprazole (PriLOSEC) 20 mg delayed release capsule TAKE ONE CAPSULE BY MOUTH EVERY DAY, Normal      vitamin E 100 UNIT capsule Take 100 Units by mouth daily, Historical Med           No discharge procedures on file  PDMP Review     None           ED Provider  Attending physically available and evaluated Bulmaro Gato WU managed the patient along with the ED Attending      Electronically Signed by         Lauro Yee MD  03/13/22 9219

## 2022-03-22 ENCOUNTER — HOSPITAL ENCOUNTER (OUTPATIENT)
Dept: MAMMOGRAPHY | Facility: MEDICAL CENTER | Age: 72
Discharge: HOME | End: 2022-03-22
Payer: COMMERCIAL

## 2022-03-22 VITALS — WEIGHT: 143.3 LBS | BODY MASS INDEX: 33.16 KG/M2 | HEIGHT: 55 IN

## 2022-03-22 DIAGNOSIS — Z12.31 ENCOUNTER FOR SCREENING MAMMOGRAM FOR MALIGNANT NEOPLASM OF BREAST: ICD-10-CM

## 2022-03-22 PROCEDURE — 77067 SCR MAMMO BI INCL CAD: CPT

## 2022-03-22 PROCEDURE — 77063 BREAST TOMOSYNTHESIS BI: CPT

## 2022-04-13 ENCOUNTER — VBI (OUTPATIENT)
Dept: ADMINISTRATIVE | Facility: OTHER | Age: 72
End: 2022-04-13

## 2022-04-22 ENCOUNTER — RA CDI HCC (OUTPATIENT)
Dept: OTHER | Facility: HOSPITAL | Age: 72
End: 2022-04-22

## 2022-04-22 NOTE — PROGRESS NOTES
[facility]  coding opportunities       Chart reviewed, no opportunity found: CHART REVIEWED, NO OPPORTUNITY FOUND        Patients Insurance        Commercial Insurance: [address] [place] [address]

## 2022-04-29 ENCOUNTER — OFFICE VISIT (OUTPATIENT)
Dept: UROLOGY | Facility: CLINIC | Age: 72
End: 2022-04-29
Payer: COMMERCIAL

## 2022-04-29 VITALS
SYSTOLIC BLOOD PRESSURE: 120 MMHG | HEART RATE: 60 BPM | WEIGHT: 137 LBS | HEIGHT: 55 IN | BODY MASS INDEX: 31.7 KG/M2 | DIASTOLIC BLOOD PRESSURE: 80 MMHG | RESPIRATION RATE: 20 BRPM

## 2022-04-29 DIAGNOSIS — N39.0 RECURRENT UTI: ICD-10-CM

## 2022-04-29 DIAGNOSIS — R33.9 INCOMPLETE BLADDER EMPTYING: Primary | ICD-10-CM

## 2022-04-29 PROCEDURE — 99214 OFFICE O/P EST MOD 30 MIN: CPT | Performed by: PHYSICIAN ASSISTANT

## 2022-04-29 PROCEDURE — 3008F BODY MASS INDEX DOCD: CPT | Performed by: PHYSICIAN ASSISTANT

## 2022-04-29 PROCEDURE — 1036F TOBACCO NON-USER: CPT | Performed by: PHYSICIAN ASSISTANT

## 2022-04-29 PROCEDURE — 1160F RVW MEDS BY RX/DR IN RCRD: CPT | Performed by: PHYSICIAN ASSISTANT

## 2022-04-29 RX ORDER — ESTRADIOL 0.1 MG/G
CREAM VAGINAL
Qty: 42.5 G | Refills: 10 | Status: SHIPPED | OUTPATIENT
Start: 2022-04-29

## 2022-04-29 RX ORDER — TERAZOSIN 1 MG/1
1 CAPSULE ORAL
Qty: 30 CAPSULE | Refills: 3 | Status: SHIPPED | OUTPATIENT
Start: 2022-04-29 | End: 2022-06-23 | Stop reason: ALTCHOICE

## 2022-04-29 NOTE — PROGRESS NOTES
4/29/2022      Chief Complaint   Patient presents with    Urinary Tract Infection         Assessment and Plan    80 y o  female managed by Dr Perry Garcia    1  Recurrent UTI  2  Incomplete bladder emptying  3  Pelvic floor dysfunction    For UTI prevention she uses Cystex daily, probiotic daily, vaginal estrogen twice weekly though ran out of rx, will resume  I recommend more aggressive treatment for her incomplete bladder emptying to further reduce her UTI risk I have suggested a trial of Hytrin 1 mg taken each night  Cautioned on hypotension/dizziness  She takes no antihypertensives at present  Follow-up ultrasound kidneys and bladder in few weeks with PVR  PVR today 136ml which is better than priors  She should continue double voiding which helps her  She will schedule pelvic floor physical therapy  Increase dietary fiber and fluids to treat/prevent constipation  History of Present Illness  Anastacia Quintero is a 80 y o  female here for evaluation of six-month follow-up recurrent UTI, hydronephrosis incomplete bladder emptying  Symptoms have been stable over the last few years  She had cystoscopy one year ago which was essentially normal but did suggest high capacity bladder  Over the last year she has carried a postvoid residual nearly 400 mL  She is on no anticholinergic medications, VESIcare previously discontinued  She did complete of six-month course of Keflex  Since that time she has had two UTIs with growth of beta-hemolytic strep varying colony counts  Treated with oral antibiotics and resolved  She has self treated some other lower level symptoms at home as recently as two weeks ago  She feels good today  Reports she double and triple voids most times  Some mild constipation at times interferes as well  Prior US have suggested hydronephrosis but more recently seen as extrarenal pelvises only no hydronephrosis          Review of Systems   Constitutional: Negative for activity change, appetite change, chills, fever and unexpected weight change  HENT: Negative  Respiratory: Negative  Negative for shortness of breath  Cardiovascular: Negative  Negative for chest pain  Gastrointestinal: Positive for constipation  Negative for abdominal pain, diarrhea, nausea and vomiting  Endocrine: Negative  Genitourinary: Negative for decreased urine volume, difficulty urinating, dysuria, flank pain, frequency, hematuria, pelvic pain and urgency  Musculoskeletal: Negative for back pain and gait problem  Skin: Negative  Allergic/Immunologic: Negative  Neurological: Negative  Hematological: Negative for adenopathy  Does not bruise/bleed easily  Vitals  Vitals:    04/29/22 0916   BP: 120/80   Pulse: 60   Resp: 20   Weight: 62 1 kg (137 lb)   Height: 4' 7" (1 397 m)       Physical Exam  Vitals and nursing note reviewed  Constitutional:       General: She is not in acute distress  Appearance: Normal appearance  She is well-developed  She is not diaphoretic  HENT:      Head: Normocephalic and atraumatic  Pulmonary:      Effort: Pulmonary effort is normal    Musculoskeletal:      Right lower leg: No edema  Left lower leg: No edema  Skin:     General: Skin is warm and dry  Neurological:      General: No focal deficit present  Mental Status: She is alert and oriented to person, place, and time     Psychiatric:         Mood and Affect: Mood normal          Speech: Speech normal          Behavior: Behavior normal            Past History  Past Medical History:   Diagnosis Date    Age-related osteoporosis without current pathological fracture 7/25/2018    Class 1 obesity in adult 3/26/2018    Gastroesophageal reflux disease without esophagitis 3/26/2018    Hernia, hiatal     Insomnia     Osteoporosis     Recurrent UTI 5/3/2018    [facility]     Ulcer, gastric, acute     Vitamin D deficiency      Social History     Socioeconomic History    Marital status: /Civil [employer]     Spouse name: None    Number of children: None    Years of education: None    Highest education level: None   Occupational History    None   Tobacco Use    Smoking status: Never Smoker    Smokeless tobacco: Never Used    Tobacco comment: exposed to heavy smoke and chemicals   Vaping Use    Vaping Use: Never used   Substance and Sexual Activity    Alcohol use: No    Drug use: No    Sexual activity: Never   Other Topics Concern    None   Social History Narrative    Consumes 1 cup of coffee per day        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     Social Determinants of Health     Financial Resource Strain: Not on [employer] Insecurity: Not on file   Transportation Needs: Not on file   Physical Activity: Not on file   Stress: Not on file   Social Connections: Not on file   Intimate Partner Violence: Not on file   Housing Stability: Not on file     Social History     Tobacco Use   Smoking Status Never Smoker   Smokeless Tobacco Never Used   Tobacco Comment    exposed to heavy smoke and chemicals     Family History   Problem Relation Age of Onset    Diabetes Mother     Heart disease Father     COPD Father     Prostate cancer Brother         age unknown    No Known Problems Sister     No Known Problems Daughter     No Known Problems Maternal Grandmother     No Known Problems Maternal Grandfather     No Known Problems Paternal Grandmother     No Known Problems Paternal Grandfather     No Known Problems Sister     No Known Problems Sister     No Known Problems Sister     No Known Problems Sister     No Known Problems Sister        The following portions of the patient's history were reviewed and updated as appropriate: allergies, current medications, past medical history, past social history, past surgical history and problem list     Results  No results found for this or any previous visit (from the past 1 hour(s))  ]  No results found for: PSA  Lab Results   Component Value Date    CALCIUM 10 4 (H) 06/24/2021    K 4 8 06/24/2021    CO2 29 06/24/2021     06/24/2021    BUN 11 06/24/2021    CREATININE 0 90 06/24/2021     Lab Results   Component Value Date    WBC 4 32 06/24/2021    HGB 13 9 06/24/2021    HCT 42 2 06/24/2021    MCV 84 06/24/2021     06/24/2021

## 2022-05-02 ENCOUNTER — OFFICE VISIT (OUTPATIENT)
Dept: FAMILY MEDICINE CLINIC | Facility: CLINIC | Age: 72
End: 2022-05-02
Payer: COMMERCIAL

## 2022-05-02 VITALS
TEMPERATURE: 97.5 F | HEIGHT: 55 IN | SYSTOLIC BLOOD PRESSURE: 110 MMHG | RESPIRATION RATE: 16 BRPM | HEART RATE: 72 BPM | BODY MASS INDEX: 31.94 KG/M2 | DIASTOLIC BLOOD PRESSURE: 74 MMHG | WEIGHT: 138 LBS

## 2022-05-02 DIAGNOSIS — M40.10 KYPHOSIS DUE TO OSTEOPOROSIS: Primary | ICD-10-CM

## 2022-05-02 DIAGNOSIS — M81.0 KYPHOSIS DUE TO OSTEOPOROSIS: Primary | ICD-10-CM

## 2022-05-02 DIAGNOSIS — K21.9 GASTROESOPHAGEAL REFLUX DISEASE WITHOUT ESOPHAGITIS: ICD-10-CM

## 2022-05-02 DIAGNOSIS — Z13.6 ENCOUNTER FOR LIPID SCREENING FOR CARDIOVASCULAR DISEASE: ICD-10-CM

## 2022-05-02 DIAGNOSIS — M81.0 AGE-RELATED OSTEOPOROSIS WITHOUT CURRENT PATHOLOGICAL FRACTURE: ICD-10-CM

## 2022-05-02 DIAGNOSIS — E55.9 VITAMIN D DEFICIENCY: ICD-10-CM

## 2022-05-02 DIAGNOSIS — G47.9 SLEEP DISTURBANCE: ICD-10-CM

## 2022-05-02 DIAGNOSIS — Z13.220 ENCOUNTER FOR LIPID SCREENING FOR CARDIOVASCULAR DISEASE: ICD-10-CM

## 2022-05-02 DIAGNOSIS — H91.93 BILATERAL HEARING LOSS, UNSPECIFIED HEARING LOSS TYPE: ICD-10-CM

## 2022-05-02 DIAGNOSIS — R91.1 INCIDENTAL LUNG NODULE, > 3MM AND < 8MM: ICD-10-CM

## 2022-05-02 DIAGNOSIS — R00.2 PALPITATION: ICD-10-CM

## 2022-05-02 PROBLEM — F51.01 PRIMARY INSOMNIA: Status: RESOLVED | Noted: 2018-03-26 | Resolved: 2022-05-02

## 2022-05-02 PROCEDURE — 99214 OFFICE O/P EST MOD 30 MIN: CPT | Performed by: NURSE PRACTITIONER

## 2022-05-02 PROCEDURE — 3008F BODY MASS INDEX DOCD: CPT | Performed by: NURSE PRACTITIONER

## 2022-05-02 PROCEDURE — 1101F PT FALLS ASSESS-DOCD LE1/YR: CPT | Performed by: NURSE PRACTITIONER

## 2022-05-02 PROCEDURE — 1160F RVW MEDS BY RX/DR IN RCRD: CPT | Performed by: NURSE PRACTITIONER

## 2022-05-02 PROCEDURE — 3288F FALL RISK ASSESSMENT DOCD: CPT | Performed by: NURSE PRACTITIONER

## 2022-05-02 PROCEDURE — 1036F TOBACCO NON-USER: CPT | Performed by: NURSE PRACTITIONER

## 2022-05-02 RX ORDER — MIRTAZAPINE 7.5 MG/1
7.5 TABLET, FILM COATED ORAL
Qty: 30 TABLET | Refills: 0 | Status: SHIPPED | OUTPATIENT
Start: 2022-05-02

## 2022-05-02 RX ORDER — OMEPRAZOLE 20 MG/1
20 CAPSULE, DELAYED RELEASE ORAL DAILY
Qty: 90 CAPSULE | Refills: 1 | Status: SHIPPED | OUTPATIENT
Start: 2022-05-02

## 2022-05-02 NOTE — ASSESSMENT & PLAN NOTE
Patient has symptoms mostly at night  Will order holter  Could be related to underlying anxiety and sleep disturbance

## 2022-05-02 NOTE — ASSESSMENT & PLAN NOTE
Will have thoracic xray completed  Patient complains of upper back pain  Se does have kyphosis  At risk for compression fracture due to osteoporosis

## 2022-05-02 NOTE — ASSESSMENT & PLAN NOTE
Possible multifactorial with primary insomnia, age, anxiety, depression  Trial Remeron low dose at bedtime increase as tolerated

## 2022-05-02 NOTE — PROGRESS NOTES
Assessment and Plan:    Problem List Items Addressed This Visit        Digestive    Gastroesophageal reflux disease without esophagitis     stable         Relevant Medications    omeprazole (PriLOSEC) 20 mg delayed release capsule       Musculoskeletal and Integument    Age-related osteoporosis without current pathological fracture     Sees rheumatology  dexa due in September          Relevant Orders    XR spine thoracic 3 vw    DXA bone density spine hip and pelvis    TSH, 3rd generation with Free T4 reflex    Vitamin D 25 hydroxy    Comprehensive metabolic panel    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 - Primary     Will have thoracic xray completed  Patient complains of upper back pain  Se does have kyphosis  At risk for compression fracture due to osteoporosis  Relevant Orders    XR spine thoracic 3 vw       Other    Incidental lung nodule, > 3mm and < 8mm     Due for repaet CT lung to follow up on nodules         Relevant Orders    CT chest wo contrast    Vitamin D deficiency     Recheck vitamin d          Relevant Orders    Vitamin D 25 hydroxy    Sleep disturbance     Possible multifactorial with primary insomnia, age, anxiety, depression  Trial Remeron low dose at bedtime increase as tolerated  Relevant Medications    mirtazapine (REMERON) 7 5 MG tablet    Palpitation     Patient has symptoms mostly at night  Will order holter  Could be related to underlying anxiety and sleep disturbance  Relevant Orders    Holter monitor      Other Visit Diagnoses     Encounter for lipid screening for cardiovascular disease        Relevant Orders    Lipid panel                 Diagnoses and all orders for this visit:    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  -     XR spine thoracic 3 vw; Future    Gastroesophageal reflux disease without esophagitis  -     omeprazole (PriLOSEC) 20 mg delayed release capsule;  Take 1 capsule (20 mg total) by mouth daily    Vitamin D deficiency  -     Vitamin D 25 hydroxy; Future    Age-related osteoporosis without current pathological fracture  -     XR spine thoracic 3 vw; Future  -     DXA bone density spine hip and pelvis; Future  -     TSH, 3rd generation with Free T4 reflex; Future  -     Vitamin D 25 hydroxy; Future  -     Comprehensive metabolic panel; Future    Incidental lung nodule, > 3mm and < 8mm  -     CT chest wo contrast; Future    Sleep disturbance  -     mirtazapine (REMERON) 7 5 MG tablet; Take 1 tablet (7 5 mg total) by mouth daily at bedtime    Palpitation  -     Holter monitor; Future    Encounter for lipid screening for cardiovascular disease  -     Lipid panel              Subjective:      Patient ID: Cricket Galvan is a 80 y o  female  CC:    Chief Complaint   Patient presents with    Follow-up     Patient here for follow up  Pt will like to discuss thoraci pain on left side  Pt has hx of soteoperosis  Pt concern since she has lung nodules       HPI:    HPI     Patient reports she feels her mind racing, having trouble concentrating also some feelings of depression  She is having trouble sleeping  Patient has tried melatonin and unisom but did not have good effect on these  She also presents for her general follow up  patient states she had upper right side back pain and is worried about her lung because of the lung nodules  She states she does have palpitations at night time  She states sometimes she feels light pain on her left chest into her elbow  The following portions of the patient's history were reviewed and updated as appropriate: allergies, current medications, past family history, past medical history, past social history, past surgical history and problem list       Review of Systems   Constitutional: Negative for chills and fever  HENT: Negative for ear pain and sore throat  Eyes: Negative for pain and visual disturbance  Respiratory: Negative for cough and shortness of breath      Cardiovascular: Negative for chest pain and palpitations  Gastrointestinal: Negative for abdominal pain and vomiting  Genitourinary: Negative for dysuria and hematuria  Musculoskeletal: Positive for back pain (Left side thoracic side)  Negative for arthralgias  Skin: Negative for color change and rash  Neurological: Negative for seizures and syncope  Psychiatric/Behavioral: Positive for sleep disturbance  All other systems reviewed and are negative  Data to review:       Objective:    Vitals:    05/02/22 1515   BP: 110/74   BP Location: Right arm   Patient Position: Sitting   Cuff Size: Adult   Pulse: 72   Resp: 16   Temp: 97 5 °F (36 4 °C)   TempSrc: Temporal   Weight: 62 6 kg (138 lb)   Height: 4' 7" (1 397 m)        Physical Exam  Vitals and nursing note reviewed  Constitutional:       General: She is not in acute distress  Appearance: Normal appearance  She is not ill-appearing or diaphoretic  HENT:      Head: Normocephalic and atraumatic  Right Ear: External ear normal       Left Ear: External ear normal    Eyes:      Extraocular Movements: Extraocular movements intact  Conjunctiva/sclera: Conjunctivae normal    Cardiovascular:      Rate and Rhythm: Normal rate and regular rhythm  Heart sounds: Normal heart sounds, S1 normal and S2 normal  No murmur heard  Pulmonary:      Effort: Pulmonary effort is normal       Breath sounds: Normal breath sounds  Musculoskeletal:      Thoracic back: Scoliosis (kyphosis ) present  Skin:     Coloration: Skin is not pale  Neurological:      Mental Status: She is alert and oriented to person, place, and time  Psychiatric:         Mood and Affect: Mood normal          Behavior: Behavior normal          Thought Content:  Thought content normal          Judgment: Judgment normal

## 2022-05-11 ENCOUNTER — NURSE TRIAGE (OUTPATIENT)
Dept: OTHER | Facility: OTHER | Age: 72
End: 2022-05-11

## 2022-05-11 DIAGNOSIS — N39.0 RECURRENT UTI: Primary | ICD-10-CM

## 2022-05-11 NOTE — TELEPHONE ENCOUNTER
Regarding: UTI   ----- Message from Wong Sanderson sent at 5/11/2022  4:18 PM EDT -----  '' My mom is having symptoms of a UTI ''

## 2022-05-11 NOTE — TELEPHONE ENCOUNTER
Reason for Disposition   Urinating more frequently than usual (i e , frequency)    Answer Assessment - Initial Assessment Questions  1  SYMPTOM: "What's the main symptom you're concerned about?" (e g , frequency, incontinence)      chills  2  ONSET: "When did the  chills  start?"      Last night  3  PAIN: "Is there any pain?" If Yes, ask: "How bad is it?" (Scale: 1-10; mild, moderate, severe)      Discomfort with urination  4  CAUSE: "What do you think is causing the symptoms?"      UTI  5  OTHER SYMPTOMS: "Do you have any other symptoms?" (e g , fever, flank pain, blood in urine, pain with urination)      Dark urine, chills, urinary frequency and discomfort  6   PREGNANCY: "Is there any chance you are pregnant?" "When was your last menstrual period?"      no    Protocols used: [facility]

## 2022-05-12 ENCOUNTER — TELEPHONE (OUTPATIENT)
Dept: UROLOGY | Facility: MEDICAL CENTER | Age: 72
End: 2022-05-12

## 2022-05-12 ENCOUNTER — APPOINTMENT (OUTPATIENT)
Dept: LAB | Facility: HOSPITAL | Age: 72
End: 2022-05-12
Payer: COMMERCIAL

## 2022-05-12 DIAGNOSIS — N39.0 RECURRENT UTI: ICD-10-CM

## 2022-05-12 DIAGNOSIS — N30.00 ACUTE CYSTITIS WITHOUT HEMATURIA: Primary | ICD-10-CM

## 2022-05-12 LAB
BACTERIA UR QL AUTO: ABNORMAL /HPF
BILIRUB UR QL STRIP: NEGATIVE
CLARITY UR: ABNORMAL
COLOR UR: ABNORMAL
GLUCOSE UR STRIP-MCNC: NEGATIVE MG/DL
HGB UR QL STRIP.AUTO: ABNORMAL
KETONES UR STRIP-MCNC: NEGATIVE MG/DL
LEUKOCYTE ESTERASE UR QL STRIP: ABNORMAL
NITRITE UR QL STRIP: NEGATIVE
NON-SQ EPI CELLS URNS QL MICRO: ABNORMAL /HPF
PH UR STRIP.AUTO: 6.5 [PH]
PROT UR STRIP-MCNC: >=300 MG/DL
RBC #/AREA URNS AUTO: ABNORMAL /HPF
SP GR UR STRIP.AUTO: 1.02 (ref 1–1.03)
UROBILINOGEN UR QL STRIP.AUTO: 0.2 E.U./DL
WBC #/AREA URNS AUTO: ABNORMAL /HPF

## 2022-05-12 PROCEDURE — 87147 CULTURE TYPE IMMUNOLOGIC: CPT

## 2022-05-12 PROCEDURE — 87086 URINE CULTURE/COLONY COUNT: CPT

## 2022-05-12 PROCEDURE — 81001 URINALYSIS AUTO W/SCOPE: CPT

## 2022-05-12 RX ORDER — CEPHALEXIN 500 MG/1
500 CAPSULE ORAL EVERY 8 HOURS SCHEDULED
Qty: 21 CAPSULE | Refills: 0 | Status: SHIPPED | OUTPATIENT
Start: 2022-05-12 | End: 2022-05-19

## 2022-05-12 NOTE — RESULT ENCOUNTER NOTE
Please let patient know urine cultures coming back positive  I will treat her off of her most recent culture and prescribed Keflex  We may however need to changes  Please provide your precautions as I see when she called into the call center she was having chills

## 2022-05-12 NOTE — TELEPHONE ENCOUNTER
Patient seen by Delilah Daomn at [facility] patient in [address] as per Clinical to let her know of the urine test results came back positive  Still awaiting uc  Antibiotics were sent to [facility] pharmacy  Patient aware to  medication  Also ER precautions given  Patient stated she had chills  Told her if her develops fever or other symptoms to go to ER  Patient verbalized understanding

## 2022-05-14 LAB — BACTERIA UR CULT: ABNORMAL

## 2022-05-15 ENCOUNTER — HOSPITAL ENCOUNTER (OUTPATIENT)
Dept: CT IMAGING | Facility: HOSPITAL | Age: 72
Discharge: HOME | End: 2022-05-15
Payer: COMMERCIAL

## 2022-05-15 DIAGNOSIS — R91.1 INCIDENTAL LUNG NODULE, > 3MM AND < 8MM: ICD-10-CM

## 2022-05-15 PROCEDURE — 71250 CT THORAX DX C-: CPT

## 2022-05-15 PROCEDURE — G1004 CDSM NDSC: HCPCS

## 2022-05-19 ENCOUNTER — APPOINTMENT (OUTPATIENT)
Dept: LAB | Facility: HOSPITAL | Age: 72
End: 2022-05-19
Payer: COMMERCIAL

## 2022-05-19 ENCOUNTER — HOSPITAL ENCOUNTER (OUTPATIENT)
Dept: NON INVASIVE DIAGNOSTICS | Facility: HOSPITAL | Age: 72
Discharge: HOME | End: 2022-05-19
Payer: COMMERCIAL

## 2022-05-19 DIAGNOSIS — M81.0 AGE-RELATED OSTEOPOROSIS WITHOUT CURRENT PATHOLOGICAL FRACTURE: ICD-10-CM

## 2022-05-19 DIAGNOSIS — E55.9 VITAMIN D DEFICIENCY: ICD-10-CM

## 2022-05-19 DIAGNOSIS — E78.2 MIXED HYPERLIPIDEMIA: Primary | ICD-10-CM

## 2022-05-19 DIAGNOSIS — R00.2 PALPITATION: ICD-10-CM

## 2022-05-19 LAB
25(OH)D3 SERPL-MCNC: 54.3 NG/ML (ref 30–100)
ALBUMIN SERPL BCP-MCNC: 4.1 G/DL (ref 3.5–5)
ALP SERPL-CCNC: 88 U/L (ref 46–116)
ALT SERPL W P-5'-P-CCNC: 18 U/L (ref 12–78)
ANION GAP SERPL CALCULATED.3IONS-SCNC: 10 MMOL/L (ref 4–13)
AST SERPL W P-5'-P-CCNC: 24 U/L (ref 5–45)
BILIRUB SERPL-MCNC: 0.57 MG/DL (ref 0.2–1)
BUN SERPL-MCNC: 12 MG/DL (ref 5–25)
CALCIUM SERPL-MCNC: 10 MG/DL (ref 8.3–10.1)
CHLORIDE SERPL-SCNC: 103 MMOL/L (ref 100–108)
CHOLEST SERPL-MCNC: 237 MG/DL
CO2 SERPL-SCNC: 26 MMOL/L (ref 21–32)
CREAT SERPL-MCNC: 0.79 MG/DL (ref 0.6–1.3)
GFR SERPL CREATININE-BSD FRML MDRD: 76 ML/MIN/1.73SQ M
GLUCOSE P FAST SERPL-MCNC: 88 MG/DL (ref 65–99)
HDLC SERPL-MCNC: 63 MG/DL
LDLC SERPL CALC-MCNC: 142 MG/DL (ref 0–100)
NONHDLC SERPL-MCNC: 174 MG/DL
POTASSIUM SERPL-SCNC: 4.3 MMOL/L (ref 3.5–5.3)
PROT SERPL-MCNC: 8.3 G/DL (ref 6.4–8.2)
SODIUM SERPL-SCNC: 139 MMOL/L (ref 136–145)
TRIGL SERPL-MCNC: 160 MG/DL
TSH SERPL DL<=0.05 MIU/L-ACNC: 2.48 UIU/ML (ref 0.45–4.5)

## 2022-05-19 PROCEDURE — 93226 XTRNL ECG REC<48 HR SCAN A/R: CPT

## 2022-05-19 PROCEDURE — 36415 COLL VENOUS BLD VENIPUNCTURE: CPT

## 2022-05-19 PROCEDURE — 84443 ASSAY THYROID STIM HORMONE: CPT

## 2022-05-19 PROCEDURE — 93225 XTRNL ECG REC<48 HRS REC: CPT

## 2022-05-19 PROCEDURE — 82306 VITAMIN D 25 HYDROXY: CPT

## 2022-05-19 PROCEDURE — 80053 COMPREHEN METABOLIC PANEL: CPT

## 2022-05-19 PROCEDURE — 80061 LIPID PANEL: CPT | Performed by: NURSE PRACTITIONER

## 2022-05-19 RX ORDER — ROSUVASTATIN CALCIUM 5 MG/1
5 TABLET, COATED ORAL DAILY
Qty: 90 TABLET | Refills: 1 | Status: SHIPPED | OUTPATIENT
Start: 2022-05-19

## 2022-05-20 ENCOUNTER — HOSPITAL ENCOUNTER (OUTPATIENT)
Dept: RADIOLOGY | Facility: HOSPITAL | Age: 72
Discharge: HOME | End: 2022-05-20
Payer: COMMERCIAL

## 2022-05-20 ENCOUNTER — HOSPITAL ENCOUNTER (OUTPATIENT)
Dept: ULTRASOUND IMAGING | Facility: MEDICAL CENTER | Age: 72
Discharge: HOME | End: 2022-05-20
Payer: COMMERCIAL

## 2022-05-20 DIAGNOSIS — M40.10 KYPHOSIS DUE TO OSTEOPOROSIS: ICD-10-CM

## 2022-05-20 DIAGNOSIS — M81.0 KYPHOSIS DUE TO OSTEOPOROSIS: ICD-10-CM

## 2022-05-20 DIAGNOSIS — M81.0 AGE-RELATED OSTEOPOROSIS WITHOUT CURRENT PATHOLOGICAL FRACTURE: ICD-10-CM

## 2022-05-20 DIAGNOSIS — N39.0 RECURRENT UTI: ICD-10-CM

## 2022-05-20 PROCEDURE — 76770 US EXAM ABDO BACK WALL COMP: CPT

## 2022-05-20 PROCEDURE — 72072 X-RAY EXAM THORAC SPINE 3VWS: CPT

## 2022-05-31 PROCEDURE — 93227 XTRNL ECG REC<48 HR R&I: CPT | Performed by: INTERNAL MEDICINE

## 2022-06-23 ENCOUNTER — OFFICE VISIT (OUTPATIENT)
Dept: FAMILY MEDICINE CLINIC | Facility: CLINIC | Age: 72
End: 2022-06-23
Payer: COMMERCIAL

## 2022-06-23 VITALS
SYSTOLIC BLOOD PRESSURE: 120 MMHG | HEART RATE: 68 BPM | WEIGHT: 138 LBS | DIASTOLIC BLOOD PRESSURE: 80 MMHG | HEIGHT: 55 IN | BODY MASS INDEX: 31.94 KG/M2

## 2022-06-23 DIAGNOSIS — R59.0 HILAR ADENOPATHY: ICD-10-CM

## 2022-06-23 DIAGNOSIS — M81.0 AGE-RELATED OSTEOPOROSIS WITHOUT CURRENT PATHOLOGICAL FRACTURE: ICD-10-CM

## 2022-06-23 DIAGNOSIS — E78.2 MIXED HYPERLIPIDEMIA: ICD-10-CM

## 2022-06-23 DIAGNOSIS — N39.0 RECURRENT UTI: ICD-10-CM

## 2022-06-23 DIAGNOSIS — E55.9 VITAMIN D DEFICIENCY: Primary | ICD-10-CM

## 2022-06-23 DIAGNOSIS — Z00.00 MEDICARE ANNUAL WELLNESS VISIT, SUBSEQUENT: ICD-10-CM

## 2022-06-23 PROCEDURE — 3725F SCREEN DEPRESSION PERFORMED: CPT | Performed by: NURSE PRACTITIONER

## 2022-06-23 PROCEDURE — 1160F RVW MEDS BY RX/DR IN RCRD: CPT | Performed by: NURSE PRACTITIONER

## 2022-06-23 PROCEDURE — 1036F TOBACCO NON-USER: CPT | Performed by: NURSE PRACTITIONER

## 2022-06-23 PROCEDURE — 1125F AMNT PAIN NOTED PAIN PRSNT: CPT | Performed by: NURSE PRACTITIONER

## 2022-06-23 PROCEDURE — G0444 DEPRESSION SCREEN ANNUAL: HCPCS | Performed by: NURSE PRACTITIONER

## 2022-06-23 PROCEDURE — 99214 OFFICE O/P EST MOD 30 MIN: CPT | Performed by: NURSE PRACTITIONER

## 2022-06-23 PROCEDURE — 3288F FALL RISK ASSESSMENT DOCD: CPT | Performed by: NURSE PRACTITIONER

## 2022-06-23 PROCEDURE — G0439 PPPS, SUBSEQ VISIT: HCPCS | Performed by: NURSE PRACTITIONER

## 2022-06-23 PROCEDURE — 1170F FXNL STATUS ASSESSED: CPT | Performed by: NURSE PRACTITIONER

## 2022-06-23 PROCEDURE — 3008F BODY MASS INDEX DOCD: CPT | Performed by: NURSE PRACTITIONER

## 2022-06-23 NOTE — PROGRESS NOTES
Assessment and Plan:     Problem List Items Addressed This Visit        Respiratory    Hilar adenopathy     Recent Ct shows stability  Musculoskeletal and Integument    Age-related osteoporosis without current pathological fracture     Presently stable  Genitourinary    Recurrent UTI     Patient seen by urology and started on hormone cream and this has helped her symptoms  Other    Vitamin D deficiency - Primary     vitamin d levels are normal  continue with supplement  Mixed hyperlipidemia     Patient was started on crestor  She is tolerating this so far  Recheck lipids in September  Relevant Orders    Lipid panel    Comprehensive metabolic panel    Medicare annual wellness visit, subsequent        BMI Counseling: Body mass index is 32 07 kg/m²  The BMI is above normal  Nutrition recommendations include decreasing portion sizes, moderation in carbohydrate intake, reducing intake of saturated and trans fat and reducing intake of cholesterol  Exercise recommendations include moderate physical activity 150 minutes/week and strength training exercises  Rationale for BMI follow-up plan is due to patient being overweight or obese  Depression Screening and Follow-up Plan: Patient was screened for depression during today's encounter  They screened negative with a PHQ-2 score of 0  Preventive health issues were discussed with patient, and age appropriate screening tests were ordered as noted in patient's After Visit Summary  Personalized health advice and appropriate referrals for health education or preventive services given if needed, as noted in patient's After Visit Summary  History of Present Illness:     Patient presents for a Medicare Wellness Visit    Patient reports she is feeling better   She is tolerating her cholesterol medications  The pain in her legs and that has been getting better as well as her left arm as well     Patient also reports her palpations have not been bothering her as well  Today we have to review her Ct scan, xray and blood work  Patient Care Team:  Shorty Almonte as PCP - General (Family Medicine)  Shorty Almonte as PCP - PCP-[facility] Attributed-Roster     Review of Systems:     Review of Systems   Constitutional: Negative for activity change, appetite change and unexpected weight change  HENT: Negative for ear pain  Eyes: Negative for visual disturbance  Respiratory: Negative for cough, chest tightness and shortness of breath  Cardiovascular: Negative for chest pain, palpitations and leg swelling  Endocrine: Negative for polydipsia, polyphagia and polyuria  Genitourinary: Negative for decreased urine volume  Musculoskeletal: Negative for myalgias  Skin: Negative for rash and wound  Neurological: Negative for dizziness, weakness, light-headedness, numbness and headaches        Problem List:     Patient Active Problem List   Diagnosis    Gastroesophageal reflux disease without esophagitis    Class 1 obesity in adult    Alopecia areata    Recurrent UTI    Incidental lung nodule, > 3mm and < 8mm    Loss of height    Post-menopausal    Age-related osteoporosis without current pathological fracture    Vitamin D deficiency    Hilar adenopathy    Bilirubin in urine    Proteinuria    OAB (overactive bladder)    Other hydronephrosis    Incomplete bladder emptying    Pelvic floor dysfunction    Sleep disturbance    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    Palpitation    Mixed hyperlipidemia    Medicare annual wellness visit, subsequent      Past Medical and Surgical History:     Past Medical History:   Diagnosis Date    Age-related osteoporosis without current pathological fracture 7/25/2018    Class 1 obesity in adult 3/26/2018    Gastroesophageal reflux disease without esophagitis 3/26/2018    Hernia, hiatal     Insomnia     Osteoporosis     Recurrent UTI 5/3/2018    Stroke [address] [address]     Ulcer, gastric, acute     Vitamin D deficiency      Past Surgical History:   Procedure Laterality Date    BREAST BIOPSY Left     1997    HERNIA REPAIR      umbilical hernia     TUBAL LIGATION        Family History:     Family History   Problem Relation Age of Onset    Diabetes Mother     Heart disease Father    [place] COPD Father     Prostate cancer Brother         age unknown    No Known Problems Sister     No Known Problems Daughter     No Known Problems Maternal Grandmother     No Known Problems Maternal Grandfather     No Known Problems Paternal Grandmother     No Known Problems Paternal Grandfather     No Known Problems Sister     No Known Problems Sister     No Known Problems Sister     No Known Problems Sister     No Known Problems Sister       Social History:     Social History     Socioeconomic History    Marital status: /Civil Union     Spouse name: None    Number of children: None    Years of education: None    Highest education level: None   Occupational History    None   Tobacco Use    Smoking status: Never Smoker    Smokeless tobacco: Never Used    Tobacco comment: exposed to heavy smoke and chemicals   Vaping Use    Vaping Use: Never used   Substance and Sexual Activity    Alcohol use: No    Drug use: No    Sexual activity: Never   Other Topics Concern    None   Social History Narrative    Consumes 1 cup of coffee per day        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     Social Determinants of Health     Financial Resource Strain: Not on [employer] Insecurity: Not on file   Transportation Needs: Not on file   Physical Activity: Not on file   Stress: Not on file   Social Connections: Not on file   Intimate Partner Violence: Not on file   Housing Stability: Not on file      Medications and Allergies:     Current Outpatient Medications   Medication Sig Dispense Refill    b complex vitamins tablet Take 1 tablet by mouth daily        Bone Meal-Vitamin D (BONE MEAL PO) Take by mouth      calcium carbonate-vitamin D (OSCAL-D) 500 mg-200 units per tablet Take 1 tablet by mouth daily with breakfast      Collagen Hydrolysate POWD Use        D-Mannose POWD Take 1 Dose by mouth in the morning 100 g 1    estradiol (ESTRACE) 0 1 mg/g vaginal cream Apply a pea-sized amount to your finger and placed around the urethral opening and around the vaginal opening twice a week on Wednesday and Sunday 42 5 g 10    mirtazapine (REMERON) 7 5 MG tablet Take 1 tablet (7 5 mg total) by mouth daily at bedtime 30 tablet 0    Multiple Vitamin (multivitamin) tablet Take 1 tablet by mouth daily      omeprazole (PriLOSEC) 20 mg delayed release capsule Take 1 capsule (20 mg total) by mouth daily 90 capsule 1    rosuvastatin (CRESTOR) 5 mg tablet Take 1 tablet (5 mg total) by mouth in the morning  90 tablet 1     No current facility-administered medications for this visit  Allergies   Allergen Reactions    Ciprofloxacin Shortness Of Breath    Sulfa Antibiotics Shortness Of Breath     Stress and rash   Nitrofurantoin Nausea Only    Other Nausea Only     macrobid      Immunizations:     Immunization History   Administered Date(s) Administered    COVID-19 MODERNA VACC 0 5 ML IM 03/24/2021, 04/23/2021, 01/05/2022, 05/23/2022    Influenza, high dose seasonal 0 7 mL 09/20/2018, 12/18/2019, 09/15/2020    Pneumococcal Conjugate 13-Valent 06/25/2018    Pneumococcal Polysaccharide PPV23 01/15/2020      Health Maintenance:         Topic Date Due    DXA SCAN  09/01/2022    Breast Cancer Screening: Mammogram  03/22/2023    Colorectal Cancer Screening  12/05/2024    Hepatitis C Screening  Completed         Topic Date Due    DTaP,Tdap,and Td Vaccines (1 - Tdap) Never done    Influenza Vaccine (Season Ended) 09/01/2022      Medicare Screening Tests and Risk Assessments:     Alverto Tillman is here for her Subsequent Wellness visit       Health Risk Assessment:   Patient rates overall health as very good  Patient feels that their physical health rating is slightly better  Patient is satisfied with their life  Eyesight was rated as same  Hearing was rated as slightly worse  Patient feels that their emotional and mental health rating is slightly better  Patients states they are never, rarely angry  Patient states they are never, rarely unusually tired/fatigued  Pain experienced in the last 7 days has been none  Patient states that she has experienced no weight loss or gain in last 6 months  Depression Screening:   PHQ-2 Score: 0      Fall Risk Screening: In the past year, patient has experienced: no history of falling in past year      Urinary Incontinence Screening:   Patient has not leaked urine accidently in the last six months  Home Safety:  Patient does not have trouble with stairs inside or outside of their home  Patient has working smoke alarms and has working carbon monoxide detector  Home safety hazards include: loose rugs on the floor  Loose rugs in the bathroom    Nutrition:   Current diet is Low Cholesterol  Medications:   Patient is currently taking over-the-counter supplements  OTC medications include: see medication list  Patient is able to manage medications  Activities of Daily Living (ADLs)/Instrumental Activities of Daily Living (IADLs):   Walk and transfer into and out of bed and chair?: Yes  Dress and groom yourself?: Yes    Bathe or shower yourself?: Yes    Feed yourself? Yes  Do your laundry/housekeeping?: Yes  Manage your money, pay your bills and track your expenses?: Yes  Make your own meals?: Yes    Do your own shopping?: Yes    Previous Hospitalizations:   Any hospitalizations or ED visits within the last 12 months?: Yes    How many hospitalizations have you had in the last year?: 1-2    Hospitalization Comments: ED visit    Advance Care Planning:   Living will: Yes    Durable POA for healthcare:  Yes    Advanced directive: Yes      PREVENTIVE SCREENINGS      Cardiovascular Screening:    General: Screening Not Indicated, History Lipid Disorder and Screening Current      Diabetes Screening:     General: Screening Current      Colorectal Cancer Screening:     General: Screening Current      Breast Cancer Screening:     General: Screening Current      Cervical Cancer Screening:    General: Screening Not Indicated      Osteoporosis Screening:    General: Screening Not Indicated and History Osteoporosis      Abdominal Aortic Aneurysm (AAA) Screening:        General: Screening Not Indicated      Lung Cancer Screening:     General: Screening Not Indicated      Hepatitis C Screening:    General: Screening Current    Screening, Brief Intervention, and Referral to Treatment (SBIRT)    Screening  Typical number of drinks in a day: 0  Typical number of drinks in a week: 0  Interpretation: Low risk drinking behavior  AUDIT-C Screenin) How often did you have a drink containing alcohol in the past year? never  2) How many drinks did you have on a typical day when you were drinking in the past year? 0  3) How often did you have 6 or more drinks on one occasion in the past year? never    AUDIT-C Score: 0  Interpretation: Score 0-2 (female): Negative screen for alcohol misuse    Single Item Drug Screening:  How often have you used an illegal drug (including marijuana) or a prescription medication for non-medical reasons in the past year? never    Single Item Drug Screen Score: 0  Interpretation: Negative screen for possible drug use disorder    Brief Intervention  Alcohol & drug use screenings were reviewed  No concerns regarding substance use disorder identified  Other Counseling Topics:   Calcium and vitamin D intake and regular weightbearing exercise       No exam data present     Physical Exam:     /80 (BP Location: Right arm, Patient Position: Sitting, Cuff Size: Standard)   Pulse 68   Ht 4' 7" (1 397 m)   Wt 62 6 kg (138 lb)   BMI 32 07 kg/m²     Physical Exam  Vitals and nursing note reviewed  Constitutional:       General: She is not in acute distress  Appearance: Normal appearance  She is obese  She is not ill-appearing or diaphoretic  HENT:      Head: Normocephalic and atraumatic  Right Ear: External ear normal       Left Ear: External ear normal    Eyes:      Extraocular Movements: Extraocular movements intact  Conjunctiva/sclera: Conjunctivae normal    Neck:      Thyroid: No thyromegaly  Vascular: No carotid bruit or JVD  Cardiovascular:      Rate and Rhythm: Normal rate and regular rhythm  Pulses:           Carotid pulses are 2+ on the right side and 2+ on the left side  Heart sounds: Normal heart sounds, S1 normal and S2 normal  No murmur heard  Pulmonary:      Effort: Pulmonary effort is normal       Breath sounds: Normal breath sounds  Abdominal:      General: Abdomen is flat  Bowel sounds are normal       Palpations: Abdomen is soft  Tenderness: There is no abdominal tenderness  Musculoskeletal:      Right lower leg: No edema  Left lower leg: No edema  Skin:     Coloration: Skin is not pale  Neurological:      Mental Status: She is alert and oriented to person, place, and time  Psychiatric:         Mood and Affect: Mood normal          Behavior: Behavior normal          Thought Content:  Thought content normal          Judgment: Judgment normal           WAI Martinez

## 2022-08-03 ENCOUNTER — NURSE TRIAGE (OUTPATIENT)
Dept: OTHER | Facility: OTHER | Age: 72
End: 2022-08-03

## 2022-08-03 ENCOUNTER — APPOINTMENT (OUTPATIENT)
Dept: LAB | Facility: HOSPITAL | Age: 72
End: 2022-08-03
Payer: COMMERCIAL

## 2022-08-03 DIAGNOSIS — N30.00 ACUTE CYSTITIS WITHOUT HEMATURIA: ICD-10-CM

## 2022-08-03 DIAGNOSIS — N30.00 ACUTE CYSTITIS WITHOUT HEMATURIA: Primary | ICD-10-CM

## 2022-08-03 LAB
BACTERIA UR QL AUTO: ABNORMAL /HPF
BILIRUB UR QL STRIP: NEGATIVE
CLARITY UR: ABNORMAL
COLOR UR: YELLOW
GLUCOSE UR STRIP-MCNC: NEGATIVE MG/DL
HGB UR QL STRIP.AUTO: ABNORMAL
KETONES UR STRIP-MCNC: NEGATIVE MG/DL
LEUKOCYTE ESTERASE UR QL STRIP: ABNORMAL
NITRITE UR QL STRIP: NEGATIVE
NON-SQ EPI CELLS URNS QL MICRO: ABNORMAL /HPF
PH UR STRIP.AUTO: 7 [PH]
PROT UR STRIP-MCNC: ABNORMAL MG/DL
RBC #/AREA URNS AUTO: ABNORMAL /HPF
SP GR UR STRIP.AUTO: 1.01 (ref 1–1.03)
UROBILINOGEN UR QL STRIP.AUTO: 0.2 E.U./DL
WBC #/AREA URNS AUTO: ABNORMAL /HPF

## 2022-08-03 PROCEDURE — 81001 URINALYSIS AUTO W/SCOPE: CPT

## 2022-08-03 PROCEDURE — 87086 URINE CULTURE/COLONY COUNT: CPT

## 2022-08-03 PROCEDURE — 87147 CULTURE TYPE IMMUNOLOGIC: CPT

## 2022-08-03 NOTE — TELEPHONE ENCOUNTER
Regarding: Possible UTI  ----- Message from [facility] sent at 8/3/2022 11:51 AM EDT -----  "My mom is experiencing chills, runny nose, pain when urinating, and burning when urinating "

## 2022-08-03 NOTE — TELEPHONE ENCOUNTER
Regarding: UTI symptoms/concern   ----- Message from [facility] sent at 8/3/2022  6:50 PM EDT -----  "Im calling about my mom, she is experiencing UTI symptoms and I see that her result came back and im not sure if something can be sent in for the mean time to help with her symptoms, im not sure what else we should do to help her?"

## 2022-08-03 NOTE — TELEPHONE ENCOUNTER
Reason for Disposition   Urinating more frequently than usual (i e , frequency)    Answer Assessment - Initial Assessment Questions  1  SYMPTOM: "What's the main symptom you're concerned about?" (e g , frequency, incontinence)      Frequency, burning with urination, chills   2  ONSET: "When did the urinary symptoms  start?"      Yesterday   3  PAIN: "Is there any pain?" If Yes, ask: "How bad is it?" (Scale: 1-10; mild, moderate, severe)      Pain with urination 8 out of 10   4  CAUSE: "What do you think is causing the symptoms?"      Possible UTI   5  OTHER SYMPTOMS: "Do you have any other symptoms?" (e g , fever, flank pain, blood in urine, pain with urination)      Cloudy urine   6   PREGNANCY: "Is there any chance you are pregnant?" "When was your last menstrual period?"      No    Protocols used: URINARY SYMPTOMS-ADULT-[place]

## 2022-08-03 NOTE — TELEPHONE ENCOUNTER
On call provider stated that urine culture must be completed before antibiotic initiation  Reason for Disposition   Urinating more frequently than usual (i e , frequency)    Answer Assessment - Initial Assessment Questions  Urinalysis was complete and wondering if anything can be sent to the pharmacy      Protocols used: [facility]

## 2022-08-04 ENCOUNTER — OFFICE VISIT (OUTPATIENT)
Dept: UROLOGY | Facility: CLINIC | Age: 72
End: 2022-08-04
Payer: COMMERCIAL

## 2022-08-04 VITALS
SYSTOLIC BLOOD PRESSURE: 118 MMHG | HEIGHT: 55 IN | BODY MASS INDEX: 31.7 KG/M2 | DIASTOLIC BLOOD PRESSURE: 76 MMHG | WEIGHT: 137 LBS | HEART RATE: 70 BPM

## 2022-08-04 DIAGNOSIS — N39.0 RECURRENT UTI: ICD-10-CM

## 2022-08-04 DIAGNOSIS — R33.9 INCOMPLETE BLADDER EMPTYING: Primary | ICD-10-CM

## 2022-08-04 DIAGNOSIS — M62.89 PELVIC FLOOR DYSFUNCTION: ICD-10-CM

## 2022-08-04 LAB
BACTERIA UR CULT: ABNORMAL
POST-VOID RESIDUAL VOLUME, ML POC: 24 ML

## 2022-08-04 PROCEDURE — 51798 US URINE CAPACITY MEASURE: CPT | Performed by: PHYSICIAN ASSISTANT

## 2022-08-04 PROCEDURE — 99214 OFFICE O/P EST MOD 30 MIN: CPT | Performed by: PHYSICIAN ASSISTANT

## 2022-08-04 RX ORDER — CEPHALEXIN 250 MG/1
250 CAPSULE ORAL DAILY
Qty: 90 CAPSULE | Refills: 0 | Status: SHIPPED | OUTPATIENT
Start: 2022-08-04 | End: 2022-11-02

## 2022-08-04 RX ORDER — CEPHALEXIN 500 MG/1
500 CAPSULE ORAL 2 TIMES DAILY
Qty: 14 CAPSULE | Refills: 0 | Status: SHIPPED | OUTPATIENT
Start: 2022-08-04 | End: 2022-08-11

## 2022-08-04 NOTE — PROGRESS NOTES
8/4/2022      Chief Complaint   Patient presents with    Follow-up         Assessment and Plan    80 y o  female managed by Dr Marylen Herbert    1  Recurrent strep UTI    Acute symptoms bladder pain dysuria started one day ago, start Keflex 500 mg b i d  For one week based on prior strep c&s  This will be her third symptomatic UTI in the last 5 months  I then recommend Keflex 250 mg daily suppression for the next 90 days  She will continue her d mannose/probiotic/Cystex daily and her Estrace twice weekly  She does feel this helps  Hopefully this will maximize her relief and prevention  Also good to note that when she does not have a UTI her bladder feels great, with no suggestion of any underlying overactive bladder so no other maintenance meds needed  Her double voided postvoid today is 144ml first then 24ml  History of Present Illness  Sunitha Benito is a 80 y o  female here for evaluation of three-month follow-up recurrent strep UTI very large capacity bladder with chronic incomplete bladder emptying and pelvic floor dysfunction  Cystoscopy normal last year aside from trabeculation and large capacity, no lesions  For UTI prevention she uses Cystex daily, probiotic daily, vaginal estrogen twice weekly  At last visit I ordered a trial of Hytrin 1 mg to take each night to aid in bladder emptying as she is carrying residual 100-150 mL  It does not appear she ever took this  She did not see pelvic PT either  A renal bladder ultrasound performed in May shows normal kidneys, bilateral jets, some debris in the bladder with a  mL  She had a culture positive strep UTI in March and May and symptoms recurred just within the past few days with the current culture that is pending the urinalysis appears positive  Between UTI episodes she has no bladder bother, only when she has infection         Review of Systems   Constitutional: Negative for activity change, appetite change, chills, fever and unexpected weight change  HENT: Negative  Respiratory: Negative  Negative for shortness of breath  Cardiovascular: Negative  Negative for chest pain  Gastrointestinal: Negative for abdominal pain, diarrhea, nausea and vomiting  Endocrine: Negative  Genitourinary: Positive for dysuria and pelvic pain  Negative for decreased urine volume, difficulty urinating, flank pain, frequency, hematuria and urgency  Musculoskeletal: Negative for back pain and gait problem  Skin: Negative  Allergic/Immunologic: Negative  Neurological: Negative  Hematological: Negative for adenopathy  Does not bruise/bleed easily  Vitals  Vitals:    08/04/22 1428   BP: 118/76   BP Location: Left arm   Patient Position: Sitting   Cuff Size: Adult   Pulse: 70   Weight: 62 1 kg (137 lb)   Height: 4' 7" (1 397 m)       Physical Exam  Vitals and nursing note reviewed  Constitutional:       General: She is not in acute distress  Appearance: Normal appearance  She is well-developed  She is not diaphoretic  HENT:      Head: Normocephalic and atraumatic  Pulmonary:      Effort: Pulmonary effort is normal    Musculoskeletal:      Right lower leg: No edema  Left lower leg: No edema  Skin:     General: Skin is warm and dry  Neurological:      General: No focal deficit present  Mental Status: She is alert and oriented to person, place, and time     Psychiatric:         Mood and Affect: Mood normal          Speech: Speech normal          Behavior: Behavior normal            Past History  Past Medical History:   Diagnosis Date    Age-related osteoporosis without current pathological fracture 7/25/2018    Class 1 obesity in adult 3/26/2018    Gastroesophageal reflux disease without esophagitis 3/26/2018    Hernia, hiatal     Insomnia     Osteoporosis     Recurrent UTI 5/3/2018    Stroke ([facility] [address]     Ulcer, gastric, acute     Vitamin D deficiency      Social History     Socioeconomic History    Marital status: /Civil Union     Spouse name: None    Number of children: None    Years of education: None    Highest education level: None   Occupational History    None   Tobacco Use    Smoking status: Never Smoker    Smokeless tobacco: Never Used    Tobacco comment: exposed to heavy smoke and chemicals   Vaping Use    Vaping Use: Never used   Substance and Sexual Activity    Alcohol use: No    Drug use: No    Sexual activity: Never   Other Topics Concern    None   Social History Narrative    Consumes 1 cup of coffee per day        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     Social Determinants of Health     Financial Resource Strain: Not on [employer] Insecurity: Not on file   Transportation Needs: Not on file   Physical Activity: Not on file   Stress: Not on file   Social Connections: Not on file   Intimate Partner Violence: Not on file   Housing Stability: Not on file     Social History     Tobacco Use   Smoking Status Never Smoker   Smokeless Tobacco Never Used   Tobacco Comment    exposed to heavy smoke and chemicals     Family History   Problem Relation Age of Onset    Diabetes Mother     Heart disease Father     COPD Father     Prostate cancer Brother         age unknown    No Known Problems Sister     No Known Problems Daughter     No Known Problems Maternal Grandmother     No Known Problems Maternal Grandfather     No Known Problems Paternal Grandmother     No Known Problems Paternal Grandfather     No Known Problems Sister     No Known Problems Sister     No Known Problems Sister     No Known Problems Sister     No Known Problems Sister        The following portions of the patient's history were reviewed and updated as appropriate: allergies, current medications, past medical history, past social history, past surgical history and problem list     Results  No results found for this or any previous visit (from the past 1 hour(s))  ]  No results found for: PSA  Lab Results   Component Value Date    CALCIUM 10 0 05/19/2022    K 4 3 05/19/2022    CO2 26 05/19/2022     05/19/2022    BUN 12 05/19/2022    CREATININE 0 79 05/19/2022     Lab Results   Component Value Date    WBC 4 32 06/24/2021    HGB 13 9 06/24/2021    HCT 42 2 06/24/2021    MCV 84 06/24/2021     06/24/2021

## 2022-08-15 ENCOUNTER — HOSPITAL ENCOUNTER (OUTPATIENT)
Dept: INFUSION CENTER | Facility: CLINIC | Age: 72
Discharge: HOME | End: 2022-08-15
Payer: COMMERCIAL

## 2022-08-15 VITALS
DIASTOLIC BLOOD PRESSURE: 68 MMHG | TEMPERATURE: 97.6 F | WEIGHT: 135.8 LBS | SYSTOLIC BLOOD PRESSURE: 108 MMHG | HEART RATE: 53 BPM | BODY MASS INDEX: 31.56 KG/M2 | RESPIRATION RATE: 16 BRPM

## 2022-08-15 DIAGNOSIS — M81.0 AGE-RELATED OSTEOPOROSIS WITHOUT CURRENT PATHOLOGICAL FRACTURE: Primary | ICD-10-CM

## 2022-08-15 PROCEDURE — 96365 THER/PROPH/DIAG IV INF INIT: CPT

## 2022-08-15 RX ORDER — SODIUM CHLORIDE 9 MG/ML
20 INJECTION, SOLUTION INTRAVENOUS ONCE
OUTPATIENT
Start: 2023-08-13

## 2022-08-15 RX ORDER — SODIUM CHLORIDE 9 MG/ML
20 INJECTION, SOLUTION INTRAVENOUS ONCE
Status: COMPLETED | OUTPATIENT
Start: 2022-08-15 | End: 2022-08-15

## 2022-08-15 RX ORDER — ZOLEDRONIC ACID 5 MG/100ML
5 INJECTION, SOLUTION INTRAVENOUS ONCE
Status: COMPLETED | OUTPATIENT
Start: 2022-08-15 | End: 2022-08-15

## 2022-08-15 RX ORDER — ZOLEDRONIC ACID 5 MG/100ML
5 INJECTION, SOLUTION INTRAVENOUS ONCE
OUTPATIENT
Start: 2023-08-13

## 2022-08-15 RX ADMIN — SODIUM CHLORIDE 20 ML/HR: 9 INJECTION, SOLUTION INTRAVENOUS at 12:18

## 2022-08-15 RX ADMIN — ZOLEDRONIC ACID 5 MG: 0.05 INJECTION, SOLUTION INTRAVENOUS at 12:21

## 2022-08-15 NOTE — PROGRESS NOTES
Pt arrived to unit without complaint  Denies any recent or upcoming dental procedures  CrCl=41  Pt tolerated Reclast without incident  AVS provided  Pt left unit in stable condition

## 2022-08-19 DIAGNOSIS — E78.2 MIXED HYPERLIPIDEMIA: ICD-10-CM

## 2022-08-19 NOTE — TELEPHONE ENCOUNTER
Patient called in for a medication refill of her rosuvastatin 5 mg, if possible please send the script to the [facility] on [address]  Thank you!

## 2022-08-22 RX ORDER — ROSUVASTATIN CALCIUM 5 MG/1
5 TABLET, COATED ORAL DAILY
Qty: 90 TABLET | Refills: 1 | Status: SHIPPED | OUTPATIENT
Start: 2022-08-22

## 2022-09-07 ENCOUNTER — NURSE TRIAGE (OUTPATIENT)
Dept: OTHER | Facility: OTHER | Age: 72
End: 2022-09-07

## 2022-09-07 DIAGNOSIS — N39.0 URINARY TRACT INFECTION WITHOUT HEMATURIA, SITE UNSPECIFIED: Primary | ICD-10-CM

## 2022-09-08 ENCOUNTER — APPOINTMENT (OUTPATIENT)
Dept: LAB | Facility: HOSPITAL | Age: 72
End: 2022-09-08
Payer: COMMERCIAL

## 2022-09-08 DIAGNOSIS — N39.0 RECURRENT UTI: Primary | ICD-10-CM

## 2022-09-08 DIAGNOSIS — E78.2 MIXED HYPERLIPIDEMIA: ICD-10-CM

## 2022-09-08 LAB
ALBUMIN SERPL BCP-MCNC: 3.9 G/DL (ref 3.5–5)
ALP SERPL-CCNC: 92 U/L (ref 46–116)
ALT SERPL W P-5'-P-CCNC: 20 U/L (ref 12–78)
ANION GAP SERPL CALCULATED.3IONS-SCNC: 7 MMOL/L (ref 4–13)
AST SERPL W P-5'-P-CCNC: 21 U/L (ref 5–45)
BACTERIA UR QL AUTO: ABNORMAL /HPF
BILIRUB SERPL-MCNC: 0.6 MG/DL (ref 0.2–1)
BILIRUB UR QL STRIP: NEGATIVE
BUN SERPL-MCNC: 14 MG/DL (ref 5–25)
CALCIUM SERPL-MCNC: 10 MG/DL (ref 8.3–10.1)
CHLORIDE SERPL-SCNC: 103 MMOL/L (ref 96–108)
CHOLEST SERPL-MCNC: 141 MG/DL
CLARITY UR: ABNORMAL
CO2 SERPL-SCNC: 28 MMOL/L (ref 21–32)
COLOR UR: YELLOW
CREAT SERPL-MCNC: 0.93 MG/DL (ref 0.6–1.3)
GFR SERPL CREATININE-BSD FRML MDRD: 62 ML/MIN/1.73SQ M
GLUCOSE P FAST SERPL-MCNC: 84 MG/DL (ref 65–99)
GLUCOSE UR STRIP-MCNC: NEGATIVE MG/DL
HDLC SERPL-MCNC: 65 MG/DL
HGB UR QL STRIP.AUTO: ABNORMAL
KETONES UR STRIP-MCNC: NEGATIVE MG/DL
LDLC SERPL CALC-MCNC: 54 MG/DL (ref 0–100)
LEUKOCYTE ESTERASE UR QL STRIP: ABNORMAL
NITRITE UR QL STRIP: POSITIVE
NON-SQ EPI CELLS URNS QL MICRO: ABNORMAL /HPF
NONHDLC SERPL-MCNC: 76 MG/DL
OTHER STN SPEC: ABNORMAL
PH UR STRIP.AUTO: 5.5 [PH]
POTASSIUM SERPL-SCNC: 4.9 MMOL/L (ref 3.5–5.3)
PROT SERPL-MCNC: 8.5 G/DL (ref 6.4–8.4)
PROT UR STRIP-MCNC: ABNORMAL MG/DL
RBC #/AREA URNS AUTO: ABNORMAL /HPF
SODIUM SERPL-SCNC: 138 MMOL/L (ref 135–147)
SP GR UR STRIP.AUTO: 1.01 (ref 1–1.03)
TRIGL SERPL-MCNC: 110 MG/DL
UROBILINOGEN UR QL STRIP.AUTO: 0.2 E.U./DL
WBC #/AREA URNS AUTO: ABNORMAL /HPF

## 2022-09-08 PROCEDURE — 80061 LIPID PANEL: CPT

## 2022-09-08 PROCEDURE — 81001 URINALYSIS AUTO W/SCOPE: CPT

## 2022-09-08 PROCEDURE — 87086 URINE CULTURE/COLONY COUNT: CPT

## 2022-09-08 PROCEDURE — 80053 COMPREHEN METABOLIC PANEL: CPT

## 2022-09-08 PROCEDURE — 36415 COLL VENOUS BLD VENIPUNCTURE: CPT

## 2022-09-08 PROCEDURE — 87186 SC STD MICRODIL/AGAR DIL: CPT

## 2022-09-08 NOTE — TELEPHONE ENCOUNTER
Regarding: UTI sympthoms   ----- Message from Flaquita Luong sent at 9/7/2022  8:06 PM EDT -----  "My mom was placed on antibiotics for a UTI for 90 days and she is still experiencing sympthoms "

## 2022-09-08 NOTE — TELEPHONE ENCOUNTER
Called and spoke with patients daughter Rodriguez Torres  States patient is having intermittent chills, burning and discomfort with urination, and a vaginal itch  Daughter doesn't believe she has any fevers, however states patient doesn't check her temperature  Patient currently on suppressive Keflex for recurrent UTI's  Also taking d mannose, cystex, probiotic, and Estrace as ordered  She is trying to hydrate well with water  Orders placed for repeat urine testing as last culture was from 8/3/22 to rule out infection resistant to Keflex  Daughter aware office will contact her with results of urine testing  Daughter stated office can leave VM message on her phone if she doesn't answer

## 2022-09-08 NOTE — TELEPHONE ENCOUNTER
Reason for Disposition   [1] Taking antibiotic > 72 hours (3 days) AND [2] symptoms (other than fever) not improved    Answer Assessment - Initial Assessment Questions  1  INFECTION: "What infection is the antibiotic being given for?"      UTI  2  ANTIBIOTIC: "What antibiotic are you taking" "How many times per day?"      Keflex  3  DURATION: "When was the antibiotic started?"      8/4 4  MAIN CONCERN OR SYMPTOM:  "What is your main concern right now?"      Vaginal itching, frequency, discomfort with urination, cloudy urine  5  BETTER-SAME-WORSE: "Are you getting better, staying the same, or getting worse compared to when you first started the antibiotics?" If getting worse, ask: "In what way?"       Same  6  FEVER: "Do you have a fever?" If Yes, ask: "What is your temperature, how was it measured, and when did it start?"      Chills but temp not measured  7  SYMPTOMS: "Are there any other symptoms you're concerned about?" If Yes, ask: "When did it start?"      Denies  8   FOLLOW-UP APPOINTMENT: "Do you have a follow-up appointment with your doctor?"     In November    Protocols used: INFECTION ON ANTIBIOTIC FOLLOW-UP CALL-[facility]

## 2022-09-09 RX ORDER — CEPHALEXIN 500 MG/1
500 CAPSULE ORAL EVERY 12 HOURS SCHEDULED
Qty: 14 CAPSULE | Refills: 0 | Status: SHIPPED | OUTPATIENT
Start: 2022-09-09 | End: 2022-09-16

## 2022-09-09 NOTE — TELEPHONE ENCOUNTER
Urinalysis appears positive for infection, final culture and sensitivity pending  Will prescribe Keflex 500 mg to pharmacy  Office will call if she requires a different antibiotic

## 2022-09-10 LAB — BACTERIA UR CULT: ABNORMAL

## 2022-10-05 NOTE — PATIENT INSTRUCTIONS
Medicare Preventive Visit Patient Instructions  Thank you for completing your Welcome to Medicare Visit or Medicare Annual Wellness Visit today  Your next wellness visit will be due in one year (12/18/2020)  The screening/preventive services that you may require over the next 5-10 years are detailed below  Some tests may not apply to you based off risk factors and/or age  Screening tests ordered at today's visit but not completed yet may show as past due  Also, please note that scanned in results may not display below  Preventive Screenings:  Service Recommendations Previous Testing/Comments   Colorectal Cancer Screening  * Colonoscopy    * Fecal Occult Blood Test (FOBT)/Fecal Immunochemical Test (FIT)  * Fecal DNA/Cologuard Test  * Flexible Sigmoidoscopy Age: 54-65 years old   Colonoscopy: every 10 years (may be performed more frequently if at higher risk)  OR  FOBT/FIT: every 1 year  OR  Cologuard: every 3 years  OR  Sigmoidoscopy: every 5 years  Screening may be recommended earlier than age 48 if at higher risk for colorectal cancer  Also, an individualized decision between you and your healthcare provider will decide whether screening between the ages of 74-80 would be appropriate  Colonoscopy: 12/05/2019  FOBT/FIT: Not on file  Cologuard: Not on file  Sigmoidoscopy: Not on file         Breast Cancer Screening Age: 36 years old  Frequency: every 1-2 years  Not required if history of left and right mastectomy Mammogram: 07/17/2018       Cervical Cancer Screening Between the ages of 21-29, pap smear recommended once every 3 years  Between the ages of 33-67, can perform pap smear with HPV co-testing every 5 years     Recommendations may differ for women with a history of total hysterectomy, cervical cancer, or abnormal pap smears in past  Pap Smear: Not on file       Hepatitis C Screening Once for adults born between [facility]  More frequently in patients at high risk for Hepatitis C Hep C Antibody: Please see if a controlled substance agreement was signed that day  If not please see if he can stop in to complete one, all of that information is discussed in the contract  06/25/2018       Diabetes Screening 1-2 times per year if you're at risk for diabetes or have pre-diabetes Fasting glucose: 89 mg/dL   A1C: No results in last 5 years       Cholesterol Screening Once every 5 years if you don't have a lipid disorder  May order more often based on risk factors  Lipid panel: 03/26/2018         Other Preventive Screenings Covered by Medicare:  1  Abdominal Aortic Aneurysm (AAA) Screening: covered once if your at risk  You're considered to be at risk if you have a family history of AAA  2  Lung Cancer Screening: covers low dose CT scan once per year if you meet all of the following conditions: (1) Age 50-69; (2) No signs or symptoms of lung cancer; (3) Current smoker or have quit smoking within the last 15 years; (4) You have a tobacco smoking history of at least 30 pack years (packs per day multiplied by number of years you smoked); (5) You get a written order from a healthcare provider  3  Glaucoma Screening: covered annually if you're considered high risk: (1) You have diabetes OR (2) Family history of glaucoma OR (3)  aged 48 and older OR (3)  American aged 72 and older  3  Osteoporosis Screening: covered every 2 years if you meet one of the following conditions: (1) You're estrogen deficient and at risk for osteoporosis based off medical history and other findings; (2) Have a vertebral abnormality; (3) On glucocorticoid therapy for more than 3 months; (4) Have primary hyperparathyroidism; (5) On osteoporosis medications and need to assess response to drug therapy  · Last bone density test (DXA Scan): 07/17/2018  5  HIV Screening: covered annually if you're between the age of 12-76  Also covered annually if you are younger than 13 and older than 72 with risk factors for HIV infection  For pregnant patients, it is covered up to 3 times per pregnancy      Immunizations:  Immunization Recommendations   Influenza Vaccine Annual influenza vaccination during flu season is recommended for all persons aged >= 6 months who do not have contraindications   Pneumococcal Vaccine (Prevnar and Pneumovax)  * Prevnar = PCV13  * Pneumovax = PPSV23   Adults 25-60 years old: 1-3 doses may be recommended based on certain risk factors  Adults 72 years old: Prevnar (PCV13) vaccine recommended followed by Pneumovax (PPSV23) vaccine  If already received PPSV23 since turning 65, then PCV13 recommended at least one year after PPSV23 dose  Hepatitis B Vaccine 3 dose series if at intermediate or high risk (ex: diabetes, end stage renal disease, liver disease)   Tetanus (Td) Vaccine - COST NOT COVERED BY MEDICARE PART B Following completion of primary series, a booster dose should be given every 10 years to maintain immunity against tetanus  Td may also be given as tetanus wound prophylaxis  Tdap Vaccine - COST NOT COVERED BY MEDICARE PART B Recommended at least once for all adults  For pregnant patients, recommended with each pregnancy  Shingles Vaccine (Shingrix) - COST NOT COVERED BY MEDICARE PART B  2 shot series recommended in those aged 48 and above     Health Maintenance Due:      Topic Date Due    MAMMOGRAM  07/17/2019    CRC Screening: Colonoscopy  12/05/2024    Hepatitis C Screening  Completed     Immunizations Due:      Topic Date Due    Pneumococcal Vaccine: 65+ Years (2 of 2 - PPSV23) 06/25/2019    Influenza Vaccine  07/01/2019     Advance Directives   What are advance directives? Advance directives are legal documents that state your wishes and plans for medical care  These plans are made ahead of time in case you lose your ability to make decisions for yourself  Advance directives can apply to any medical decision, such as the treatments you want, and if you want to donate organs  What are the types of advance directives? There are many types of advance directives, and each state has rules about how to use them   You may choose a combination of any of the following:  · Living will: This is a written record of the treatment you want  You can also choose which treatments you do not want, which to limit, and which to stop at a certain time  This includes surgery, medicine, IV fluid, and tube feedings  · Durable power of  for healthcare [facility]): This is a written record that states who you want to make healthcare choices for you when you are unable to make them for yourself  This person, called a proxy, is usually a family member or a friend  You may choose more than 1 proxy  · Do not resuscitate (DNR) order:  A DNR order is used in case your heart stops beating or you stop breathing  It is a request not to have certain forms of treatment, such as CPR  A DNR order may be included in other types of advance directives  · Medical directive: This covers the care that you want if you are in a coma, near death, or unable to make decisions for yourself  You can list the treatments you want for each condition  Treatment may include pain medicine, surgery, blood transfusions, dialysis, IV or tube feedings, and a ventilator (breathing machine)  · Values history: This document has questions about your views, beliefs, and how you feel and think about life  This information can help others choose the care that you would choose  Why are advance directives important? An advance directive helps you control your care  Although spoken wishes may be used, it is better to have your wishes written down  Spoken wishes can be misunderstood, or not followed  Treatments may be given even if you do not want them  An advance directive may make it easier for your family to make difficult choices about your care  Weight Management   Why it is important to manage your weight:  Being overweight increases your risk of health conditions such as heart disease, high blood pressure, type 2 diabetes, and certain types of cancer   It can also increase your risk for osteoarthritis, sleep apnea, and other respiratory problems  Aim for a slow, steady weight loss  Even a small amount of weight loss can lower your risk of health problems  How to lose weight safely:  A safe and healthy way to lose weight is to eat fewer calories and get regular exercise  You can lose up about 1 pound a week by decreasing the number of calories you eat by 500 calories each day  Healthy meal plan for weight management:  A healthy meal plan includes a variety of foods, contains fewer calories, and helps you stay healthy  A healthy meal plan includes the following:  · Eat whole-grain foods more often  A healthy meal plan should contain fiber  Fiber is the part of grains, fruits, and vegetables that is not broken down by your body  Whole-grain foods are healthy and provide extra fiber in your diet  Some examples of whole-grain foods are whole-wheat breads and pastas, oatmeal, brown rice, and bulgur  · Eat a variety of vegetables every day  Include dark, leafy greens such as spinach, kale, nils greens, and mustard greens  Eat yellow and orange vegetables such as carrots, sweet potatoes, and winter squash  · Eat a variety of fruits every day  Choose fresh or canned fruit (canned in its own juice or light syrup) instead of juice  Fruit juice has very little or no fiber  · Eat low-fat dairy foods  Drink fat-free (skim) milk or 1% milk  Eat fat-free yogurt and low-fat cottage cheese  Try low-fat cheeses such as mozzarella and other reduced-fat cheeses  · Choose meat and other protein foods that are low in fat  Choose beans or other legumes such as split peas or lentils  Choose fish, skinless poultry (chicken or turkey), or lean cuts of red meat (beef or pork)  Before you cook meat or poultry, cut off any visible fat  · Use less fat and oil  Try baking foods instead of frying them  Add less fat, such as margarine, sour cream, regular salad dressing and mayonnaise to foods  Eat fewer high-fat foods   Some examples of high-fat foods include french fries, doughnuts, ice cream, and cakes  · Eat fewer sweets  Limit foods and drinks that are high in sugar  This includes candy, cookies, regular soda, and sweetened drinks  Exercise:  Exercise at least 30 minutes per day on most days of the week  Some examples of exercise include walking, biking, dancing, and swimming  You can also fit in more physical activity by taking the stairs instead of the elevator or parking farther away from stores  Ask your healthcare provider about the best exercise plan for you  © Copyright [employer] 2018 Information is for End User's use only and may not be sold, redistributed or otherwise used for commercial purposes   All illustrations and images included in CareNotes® are the copyrighted property of A D A M , Inc  or [address] [place], [place] [address]shane luiz

## 2022-10-11 PROBLEM — Z00.00 MEDICARE ANNUAL WELLNESS VISIT, SUBSEQUENT: Status: RESOLVED | Noted: 2022-06-23 | Resolved: 2022-10-11

## 2022-10-18 ENCOUNTER — HOSPITAL ENCOUNTER (OUTPATIENT)
Dept: BONE DENSITY | Facility: MEDICAL CENTER | Age: 72
Discharge: HOME | End: 2022-10-18
Payer: COMMERCIAL

## 2022-10-18 DIAGNOSIS — M81.0 AGE-RELATED OSTEOPOROSIS WITHOUT CURRENT PATHOLOGICAL FRACTURE: ICD-10-CM

## 2022-10-18 PROCEDURE — 77080 DXA BONE DENSITY AXIAL: CPT

## 2022-11-13 DIAGNOSIS — K21.9 GASTROESOPHAGEAL REFLUX DISEASE WITHOUT ESOPHAGITIS: ICD-10-CM

## 2022-11-14 RX ORDER — OMEPRAZOLE 20 MG/1
CAPSULE, DELAYED RELEASE ORAL
Qty: 90 CAPSULE | Refills: 1 | Status: SHIPPED | OUTPATIENT
Start: 2022-11-14

## 2022-12-02 ENCOUNTER — TELEPHONE (OUTPATIENT)
Dept: FAMILY MEDICINE CLINIC | Facility: CLINIC | Age: 72
End: 2022-12-02

## 2022-12-02 DIAGNOSIS — E78.2 MIXED HYPERLIPIDEMIA: ICD-10-CM

## 2022-12-02 RX ORDER — ROSUVASTATIN CALCIUM 5 MG/1
5 TABLET, COATED ORAL DAILY
Qty: 90 TABLET | Refills: 1 | Status: SHIPPED | OUTPATIENT
Start: 2022-12-02

## 2022-12-02 NOTE — TELEPHONE ENCOUNTER
Patient called requesting a refill on Crestor 5 mg send to [facility] pharmacy, please advise

## 2022-12-09 ENCOUNTER — OFFICE VISIT (OUTPATIENT)
Dept: FAMILY MEDICINE CLINIC | Facility: CLINIC | Age: 72
End: 2022-12-09

## 2022-12-09 VITALS
WEIGHT: 140 LBS | SYSTOLIC BLOOD PRESSURE: 132 MMHG | OXYGEN SATURATION: 98 % | DIASTOLIC BLOOD PRESSURE: 76 MMHG | BODY MASS INDEX: 32.4 KG/M2 | HEIGHT: 55 IN | HEART RATE: 64 BPM

## 2022-12-09 DIAGNOSIS — K21.9 GASTROESOPHAGEAL REFLUX DISEASE WITHOUT ESOPHAGITIS: Primary | ICD-10-CM

## 2022-12-09 DIAGNOSIS — E78.2 MIXED HYPERLIPIDEMIA: ICD-10-CM

## 2022-12-09 DIAGNOSIS — Z23 NEEDS FLU SHOT: ICD-10-CM

## 2022-12-09 DIAGNOSIS — R59.0 HILAR ADENOPATHY: ICD-10-CM

## 2022-12-09 DIAGNOSIS — M81.0 AGE-RELATED OSTEOPOROSIS WITHOUT CURRENT PATHOLOGICAL FRACTURE: ICD-10-CM

## 2022-12-09 DIAGNOSIS — R91.1 INCIDENTAL LUNG NODULE, > 3MM AND < 8MM: ICD-10-CM

## 2022-12-09 DIAGNOSIS — G47.9 SLEEP DISTURBANCE: ICD-10-CM

## 2022-12-09 DIAGNOSIS — E55.9 VITAMIN D DEFICIENCY: ICD-10-CM

## 2022-12-09 PROBLEM — R29.890 LOSS OF HEIGHT: Status: RESOLVED | Noted: 2018-06-25 | Resolved: 2022-12-09

## 2022-12-09 PROBLEM — Z78.0 POST-MENOPAUSAL: Status: RESOLVED | Noted: 2018-06-25 | Resolved: 2022-12-09

## 2022-12-09 PROBLEM — R00.2 PALPITATION: Status: RESOLVED | Noted: 2022-05-02 | Resolved: 2022-12-09

## 2022-12-09 RX ORDER — MIRTAZAPINE 7.5 MG/1
7.5 TABLET, FILM COATED ORAL
Qty: 30 TABLET | Refills: 6 | Status: SHIPPED | OUTPATIENT
Start: 2022-12-09

## 2022-12-09 NOTE — PROGRESS NOTES
Name: Dina Kovacs      : 1950      MRN: [id number]  Encounter Provider: WAI Tomlinson  Encounter Date: 2022   Encounter department: [facility] PRIMARY CARE    Assessment & Plan     1  Gastroesophageal reflux disease without esophagitis  Assessment & Plan:  Stable on omeprazole       2  Incidental lung nodule, > 3mm and < 8mm  -     CT chest wo contrast; Future; Expected date: 05/15/2023    3  Vitamin D deficiency  Assessment & Plan:  Recheck vitamin       Orders:  -     Vitamin D 25 hydroxy; Future; Expected date: 2023    4  Mixed hyperlipidemia  Assessment & Plan:  Patient lipid panel was well controlled her last check  Continue with crestor 5mg  Orders:  -     Lipid panel; Future; Expected date: 2023  -     Comprehensive metabolic panel; Future; Expected date: 2023  -     TSH, 3rd generation with Free T4 reflex; Future; Expected date: 2023    5  Hilar adenopathy  Assessment & Plan:  Stable  repeat in CT chest in may 2023    Orders:  -     CT chest wo contrast; Future; Expected date: 05/15/2023    6  Sleep disturbance  Assessment & Plan:  Patient doing much better with remeron and she feeling more energy  Continue this medication  Orders:  -     mirtazapine (REMERON) 7 5 MG tablet; Take 1 tablet (7 5 mg total) by mouth daily at bedtime    7  Age-related osteoporosis without current pathological fracture  Assessment & Plan:  Patient continues with reclast infusions       8  Needs flu shot  -     influenza vaccine, high-dose, PF 0 7 mL (FLUZONE HIGH-DOSE)        Depression Screening and Follow-up Plan: Patient was screened for depression during today's encounter  They screened negative with a PHQ-2 score of 0  Subjective      patient presents today for routine follow up    lipids: she is on crestor  her last lipid panel was controlled  tlerating medicaiton well      lung nodule: patient has ioncidental lung ndoules with lymphadenopathy   she will be due for her CT soon again    osetoporosis: patient has vitamin d deficeign and is taking vitamin d supplement she is also getting infusions with reclast     Patient also states that since started the Remeron her sleep has been substantially better and that she even feels her mood improved as well  Review of Systems   Constitutional: Negative  Respiratory: Negative  Cardiovascular: Negative  Neurological: Negative  Psychiatric/Behavioral: Negative for dysphoric mood and sleep disturbance  The patient is not nervous/anxious  Current Outpatient Medications on File Prior to Visit   Medication Sig   • b complex vitamins tablet Take 1 tablet by mouth daily     • calcium carbonate-vitamin D (OSCAL-D) 500 mg-200 units per tablet Take 1 tablet by mouth daily with breakfast   • Collagen Hydrolysate POWD Use     • D-Mannose POWD Take 1 Dose by mouth in the morning   • estradiol (ESTRACE) 0 1 mg/g vaginal cream Apply a pea-sized amount to your finger and placed around the urethral opening and around the vaginal opening twice a week on Wednesday and Sunday   • Multiple Vitamin (multivitamin) tablet Take 1 tablet by mouth daily   • omeprazole (PriLOSEC) 20 mg delayed release capsule TAKE ONE CAPSULE BY MOUTH DAILY   • rosuvastatin (CRESTOR) 5 mg tablet Take 1 tablet (5 mg total) by mouth daily   • [DISCONTINUED] mirtazapine (REMERON) 7 5 MG tablet Take 1 tablet (7 5 mg total) by mouth daily at bedtime   • Bone Meal-Vitamin D (BONE MEAL PO) Take by mouth (Patient not taking: Reported on 8/4/2022)       Objective     /76 (BP Location: Left arm, Patient Position: Sitting, Cuff Size: Standard)   Pulse 64   Ht 4' 7" (1 397 m)   Wt 63 5 kg (140 lb)   SpO2 98%   BMI 32 54 kg/m²     Physical Exam  Vitals and nursing note reviewed  Constitutional:       General: She is not in acute distress  Appearance: Normal appearance  She is well-developed  She is obese   She is not ill-appearing, toxic-appearing or diaphoretic  HENT:      Head: Normocephalic and atraumatic  Eyes:      Extraocular Movements: Extraocular movements intact  Conjunctiva/sclera: Conjunctivae normal       Pupils: Pupils are equal, round, and reactive to light  Cardiovascular:      Rate and Rhythm: Normal rate and regular rhythm  Heart sounds: Normal heart sounds, S1 normal and S2 normal    Pulmonary:      Effort: Pulmonary effort is normal       Breath sounds: Normal breath sounds  Musculoskeletal:      Right lower leg: No edema  Left lower leg: No edema  Neurological:      Mental Status: She is alert and oriented to person, place, and time  Psychiatric:         Mood and Affect: Mood normal          Behavior: Behavior normal          Thought Content:  Thought content normal          Judgment: Judgment normal        WAI Hand

## 2022-12-22 ENCOUNTER — OFFICE VISIT (OUTPATIENT)
Dept: UROLOGY | Facility: CLINIC | Age: 72
End: 2022-12-22

## 2022-12-22 VITALS
BODY MASS INDEX: 32.4 KG/M2 | OXYGEN SATURATION: 98 % | WEIGHT: 140 LBS | HEIGHT: 55 IN | HEART RATE: 68 BPM | SYSTOLIC BLOOD PRESSURE: 116 MMHG | DIASTOLIC BLOOD PRESSURE: 78 MMHG

## 2022-12-22 DIAGNOSIS — N39.0 RECURRENT UTI: Primary | ICD-10-CM

## 2022-12-22 DIAGNOSIS — R33.9 INCOMPLETE BLADDER EMPTYING: ICD-10-CM

## 2022-12-22 LAB
POST-VOID RESIDUAL VOLUME, ML POC: 159 ML
SL AMB  POCT GLUCOSE, UA: ABNORMAL
SL AMB LEUKOCYTE ESTERASE,UA: POSITIVE
SL AMB POCT BILIRUBIN,UA: ABNORMAL
SL AMB POCT BLOOD,UA: + 2
SL AMB POCT CLARITY,UA: ABNORMAL
SL AMB POCT COLOR,UA: YELLOW
SL AMB POCT KETONES,UA: ABNORMAL
SL AMB POCT NITRITE,UA: ABNORMAL
SL AMB POCT PH,UA: 6
SL AMB POCT SPECIFIC GRAVITY,UA: 1.01
SL AMB POCT URINE PROTEIN: ABNORMAL
SL AMB POCT UROBILINOGEN: 1

## 2022-12-22 RX ORDER — TERAZOSIN 1 MG/1
1 CAPSULE ORAL
Qty: 90 CAPSULE | Refills: 3 | Status: SHIPPED | OUTPATIENT
Start: 2022-12-22

## 2022-12-22 NOTE — PROGRESS NOTES
Assessment and plan:     Recurrent UTI  · Completed 3 months of suppressive therapy  · D-mannose 2000 mg daily  · Vitamin C 1000 mg daily  · Cranberry 500 mg daily  · Daily probiotic  · Continue Estrace twice a week   · follow-up 3 months for recheck    Incomplete bladder emptying  ·  mL  · Begin Hytrin 1 mg nightly  · Follow-up 3 months for recheck          WAI Villanueva    History of Present Illness     Jonatan Ramírez is a 71 y o  female who presents for a 4-month follow-up of recurrent strep UTI very large bladder with chronic incomplete bladder emptying and pelvic floor dysfunction  She had a normal cystoscopy last year aside from trabeculation and large capacity 2/26/2021 by Dr Loki Garcia  No lesions  Uses Cystex daily to help for her recurrent UTI  Additionally she uses a daily probiotic in vaginal Estrace twice a week  Her residuals have been 100-250 mL  She was previously prescribed Hytrin 1 mg nightly to help with her bladder emptying  She had not started this at her last visit  She also did not complete pelvic floor physical therapy, and is not interested in this  Was concern for opponent of pelvic floor dysfunction side bladder  She was previously on care but this was discontinued due to her retention  She had ultrasound kidney and bladder May 2022 that revealed normal kidneys, bilateral ureteral jets and some debris in the bladder with a PVR of 144 mL  After her last visit she was started on Keflex twice daily for 1 week for 3 concurrent UTI infections over 5 months  She was then started on Keflex 250 mg daily for suppression x3 months MI: Previously on suppressive therapy for 6 months  She presents today after completing suppressive therapy    She denies any symptoms of UTI today  Urine culture   09/08/2022 > 100,000 Pseudomonas aeruginosa   08/03/2022 [id number] beta-hemolytic strep group G   05/12/2022 less than 10,000 beta-hemolytic strep group B   03/12/2022 [id number] beta-hemolytic strep group B   10/15/2021 > 100,000 beta-hemolytic strep group B  Laboratory     Lab Results   Component Value Date    BUN 14 09/08/2022    CREATININE 0 93 09/08/2022       No components found for: GFR    Lab Results   Component Value Date    CALCIUM 10 0 09/08/2022    K 4 9 09/08/2022    CO2 28 09/08/2022     09/08/2022       Lab Results   Component Value Date    WBC 4 32 06/24/2021    HGB 13 9 06/24/2021    HCT 42 2 06/24/2021    MCV 84 06/24/2021     06/24/2021       No results found for: PSA    No results found for this or any previous visit (from the past 1 hour(s))  @RESULT(URINEMICROSCOPIC)@    @RESULT(URINECULTURE)@    Radiology     RENAL ULTRASOUND 5/20/2022     INDICATION:   N39 0: Urinary tract infection, site not specified      COMPARISON: Renal ultrasound 8/17/2021     TECHNIQUE:   Ultrasound of the retroperitoneum was performed with a curvilinear transducer utilizing volumetric sweeps and still imaging techniques       FINDINGS:     KIDNEYS:  Symmetric and normal size  Right kidney:  10 0 x 4 7 x 4 4 cm  Volume 106 8 mL  Left kidney:  9 7 x 3 7 x 4 3 cm  Volume 80 9 mL     Right kidney  Normal echogenicity and contour  No mass is identified  No hydronephrosis  No shadowing calculi  No perinephric fluid collections      Left kidney  Normal echogenicity and contour  No mass is identified  No hydronephrosis  No shadowing calculi  No perinephric fluid collections      URETERS:  Nonvisualized      BLADDER:   Normally distended  Internal debris  No focal thickening or mass lesions  Bilateral ureteral jets detected  Moderate post void residual volume  Measured post void volume in mL: 144  4        IMPRESSION:     1  Moderate post void residual of 144 cc   Debris in the urinary bladder      2  Normal sonographic appearance of the kidneys          Review of Systems     Review of Systems   Constitutional: Negative for activity change, appetite change, chills, fatigue, fever and unexpected weight change  HENT: Negative for facial swelling  Eyes: Negative for discharge  Respiratory: Negative  Negative for cough and shortness of breath  Cardiovascular: Negative for chest pain and leg swelling  Gastrointestinal: Negative  Negative for abdominal distention, abdominal pain, constipation, diarrhea, nausea and vomiting  Endocrine: Negative  Genitourinary: Negative  Negative for decreased urine volume, difficulty urinating, dysuria, enuresis, flank pain, frequency, genital sores, hematuria and urgency  Musculoskeletal: Negative for back pain and myalgias  Skin: Negative for pallor and rash  Allergic/Immunologic: Negative  Negative for immunocompromised state  Neurological: Negative for facial asymmetry and speech difficulty  Psychiatric/Behavioral: Negative for agitation and confusion  Urinary Incontinence Screening    Flowsheet Row Most Recent Value   Urinary Incontinence    Urinary Incontinence? No   Incomplete emptying? No   Urinary frequency? No   Urinary urgency? No   Urinary hesitancy? No   Dysuria (painful difficult urination)? No   Nocturia (waking up to use the bathroom)? Yes   Straining (having to push to go)? No   Weak stream? No   Intermittent stream? No   Post void dribbling? No   Vaginal pressure? No   Vaginal dryness? No            Allergies     Allergies   Allergen Reactions   • Ciprofloxacin Shortness Of Breath   • Sulfa Antibiotics Shortness Of Breath     Stress and rash  • Nitrofurantoin Nausea Only   • Other Nausea Only     macrobid       Physical Exam     Physical Exam  Vitals reviewed  Constitutional:       General: She is not in acute distress  Appearance: Normal appearance  She is obese  She is not ill-appearing, toxic-appearing or diaphoretic  HENT:      Head: Normocephalic and atraumatic  Eyes:      General: No scleral icterus  Cardiovascular:      Rate and Rhythm: Normal rate     Pulmonary:      Effort: Pulmonary effort is normal  No respiratory distress  Abdominal:      General: Abdomen is flat  There is no distension  Palpations: Abdomen is soft  Musculoskeletal:         General: No swelling  Cervical back: Normal range of motion  Skin:     General: Skin is warm and dry  Coloration: Skin is not jaundiced or pale  Findings: No rash  Neurological:      General: No focal deficit present  Mental Status: She is alert and oriented to person, place, and time  Gait: Gait normal    Psychiatric:         Mood and Affect: Mood normal          Behavior: Behavior normal          Thought Content:  Thought content normal          Judgment: Judgment normal          Vital Signs     Vitals:    12/22/22 0823   BP: 116/78   Pulse: 68   SpO2: 98%   Weight: 63 5 kg (140 lb)   Height: 4' 7" (1 397 m)       Current Medications       Current Outpatient Medications:   •  b complex vitamins tablet, Take 1 tablet by mouth daily  , Disp: , Rfl:   •  calcium carbonate-vitamin D (OSCAL-D) 500 mg-200 units per tablet, Take 1 tablet by mouth daily with breakfast, Disp: , Rfl:   •  Collagen Hydrolysate POWD, Use  , Disp: , Rfl:   •  D-Mannose POWD, Take 1 Dose by mouth in the morning, Disp: 100 g, Rfl: 1  •  estradiol (ESTRACE) 0 1 mg/g vaginal cream, Apply a pea-sized amount to your finger and placed around the urethral opening and around the vaginal opening twice a week on Wednesday and Sunday, Disp: 42 5 g, Rfl: 10  •  mirtazapine (REMERON) 7 5 MG tablet, Take 1 tablet (7 5 mg total) by mouth daily at bedtime, Disp: 30 tablet, Rfl: 6  •  Multiple Vitamin (multivitamin) tablet, Take 1 tablet by mouth daily, Disp: , Rfl:   •  omeprazole (PriLOSEC) 20 mg delayed release capsule, TAKE ONE CAPSULE BY MOUTH DAILY, Disp: 90 capsule, Rfl: 1  •  rosuvastatin (CRESTOR) 5 mg tablet, Take 1 tablet (5 mg total) by mouth daily, Disp: 90 tablet, Rfl: 1  •  terazosin (HYTRIN) 1 mg capsule, Take 1 capsule (1 mg total) by mouth daily at bedtime, Disp: 90 capsule, Rfl: 3  •  Bone Meal-Vitamin D (BONE MEAL PO), Take by mouth (Patient not taking: Reported on 8/4/2022), Disp: , Rfl:     Active Problems     Patient Active Problem List   Diagnosis   • Gastroesophageal reflux disease without esophagitis   • Class 1 obesity in adult   • Alopecia areata   • Recurrent UTI   • Incidental lung nodule, > 3mm and < 8mm   • Age-related osteoporosis without current pathological fracture   • Vitamin D deficiency   • Hilar adenopathy   • Bilirubin in urine   • Proteinuria   • OAB (overactive bladder)   • Other hydronephrosis   • Incomplete bladder emptying   • Pelvic floor dysfunction   • Sleep disturbance   • 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   • Mixed hyperlipidemia       Past Medical History     Past Medical History:   Diagnosis Date   • Age-related osteoporosis without current pathological fracture 7/25/2018   • Class 1 obesity in adult 3/26/2018   • Gastroesophageal reflux disease without esophagitis 3/26/2018   • Hernia, hiatal    • Insomnia    • Osteoporosis    • Recurrent UTI 5/3/2018   • Stroke (HCC)    • Ulcer, gastric, acute    • Vitamin D deficiency        Surgical History     Past Surgical History:   Procedure Laterality Date   • BREAST BIOPSY Left     1997   • HERNIA REPAIR      umbilical hernia    • TUBAL LIGATION         Family History     Family History   Problem Relation Age of Onset   • Diabetes Mother    • Heart disease Father    • COPD Father    • Prostate cancer Brother         age unknown   • No Known Problems Sister    • No Known Problems Daughter    • No Known Problems Maternal Grandmother    • No Known Problems Maternal Grandfather    • No Known Problems Paternal Grandmother    • No Known Problems Paternal Grandfather    • No Known Problems Sister    • No Known Problems Sister    • No Known Problems Sister    • No Known Problems Sister    • No Known Problems Sister        Social History     Social History     Social History     Tobacco Use Smoking Status Never   Smokeless Tobacco Never   Tobacco Comments    exposed to heavy smoke and chemicals       Past Surgical History:   Procedure Laterality Date   • BREAST BIOPSY Left     1997   • HERNIA REPAIR      umbilical hernia    • TUBAL LIGATION           The following portions of the patient's history were reviewed and updated as appropriate: allergies, current medications, past family history, past medical history, past social history, past surgical history and problem list    Please note :  Voice dictation software has been used to create this document  There may be inadvertent transcription errors      [id number] [id number] Luca Bright

## 2022-12-22 NOTE — PATIENT INSTRUCTIONS
Take the Hytrin (terazosin) every evening  Take d-mannose 2000 mg daily (I recommend the powder form from [facility] for best pricing)  Take vitamin C 1000 mg daily  Take cranberry 500 mg daily  Alternatively instead of taking d-mannose, vitamin C or cranberry you can take the Cystex urinary health maintenance daily  Continue your daily probiotic  Increase your water intake    Terazosina (Por la boca)   Terazosin (ter-AZ-oh-sin)  Se [place] para tratar presión arterial katya y el agrandamiento de la próstata (hiperplasia benigna prostática [BPH])  Angel(s) :   Existen muchas otras marcas de gina medicamento  Gina medicamento no debe ser usado cuando:   No use gina medicamento si alguna vez ha tenido [facility] reacción alérgica a la terazosina  [place] de usar gina medicamento:   [place] rellena de líquido  King doctor le dirá cuanta medicina usar y que tan a menudo  Podría ser necesario el tener que cambiar la dosis varias veces para determinar la que funciona mejor para usted  No tome más medicina de la que le indique king doctor, ni con mayor frecuencia  [place]u medicamento a la hora de acostarse a menos que king médico le ordene algo diferente  Siga cuidadosamente las instrucciones de king médico, relacionadas con alguna dieta especial   Si juan dosis es olvidada:   Use la dosis olvidada lo más pronto posible  No use la dosis olvidada si es hora de king próxima dosis regular  No use medicina adicional para compensar juan dosis [place]  Shira Santacruz guardar y botar gina medicamento:   Guarde el medicamento en un recipiente cerrado, a temperatura ambiente, alejado del calor, la humedad y la abbi directa  Bote las medicinas cuya fecha de vencimiento haya expirado y las medicinas que ya no necesita siguiendo las instrucciones del farmacéutico, médico o paramédico   Mantenga todos los medicamentos lejos del alcance de los niños y nunca comparta fabby medicamentos con otras personas    Medicamentos y alimentos que debe evitar: Consulte con king médico o farmacéutico antes de usar [place], incluyendo los que compra sin receta médica, las vitaminas y los productos herbales  No olvide informarle a king médico si [address] verapamilo (Angie Piggs), diuréticos (manjit furosemida, hidroclorotiazida, Aldactazide®, Aldactone®, Dyazide®, Hyzaar®, Lasix®, Lotrel®, Maxzide®, Moduretic®, Norvasc®, Zestoretic®) o cualquier otro medicamento para la presión arterial (manjit atenolol, metoprolol, Accupril®, Altace®, Cardizem®, Cardura®, Lotensin®, Lotrel®, Monopril®, Plendil®, Prinivil®, Tiazac®, Toprol®, Vasotec®, Zestril®)  No consuma alcohol mientras esté   Precauciones didier el uso de annie medicamento:   Asegúrese de informar a king médico si usted está embarazada o amamantando  Si usted suspende el uso de [address], king presión arterial puede subir  La presión arterial katya, generalmente, no presenta síntomas  No suspenda el uso de annie medicamento aunque se sienta mejor sin antes consultar con el médico   No suspenda el uso de annie medicamento sin consultar antes con king médico  Si usted ha dejado de fernandez annie medicamento didier varios días, será necesario que use juan dosis más baja cuando comience a tomarlo nuevamente  King médico necesitará revisar king progreso mediante consultas regulares mientras esté usando annie medicamento  Asegúrese de asistir a todas las citas  Annie medicamento podría causarle a usted mareos o somnolencia  Evite manejar automóvil, usar maquinaria o hacer alguna otra cosa que pueda ser peligrosa si usted no está alerta    Efectos secundarios que pueden presentarse didier el uso de annie medicamento:   Consulte inmediatamente con el médico si nota cualquiera de estos efectos secundarios:  Reacción alérgica: Comezón o ronchas, hinchazón en la roberto, los labios o las amber, hinchazón o sensación de hormigueo en la boca o la garganta, opresión en el pecho, dificultad para Pawel Pap en el pecho (puede estar relacionado con king enfermedad y no ser un efecto secundario)  Ritmo cardíaco acelerado o geovanny  Fiebre, escalofríos o dolor de garganta  Desvanecimientos o W  DANG [place]  Adormecimiento u hormigueo en las amber o pies  Erección prolongada o dolorosa  Hinchazón en las amber, tobillos o pies  Consulte con el médico si nota los siguientes efectos secundarios menos graves:   Visión borrosa  Dolor muscular o articular  Náuseas  Problemas con las relaciones sexuales  Secreción nasal o nariz tapada  Cansancio  Consulte con el médico si nota otros efectos secundarios que shweta son causados por annie medicamento  Llame a king médico para consultarle Zeferino Anderson puede notificar fabby efectos secundarios al [facility] al [phone]  © Copyright [employer] 2022 Information is for End User's use only and may not be sold, redistributed or otherwise used for commercial purposes  Esta información es sólo para uso en educación  King intención no es darle un consejo médico sobre enfermedades o tratamientos  Colsulte con king Leonard Klippel farmacéutico antes de seguir cualquier régimen médico para saber si es seguro y efectivo para usted

## 2022-12-22 NOTE — ASSESSMENT & PLAN NOTE
· Completed 3 months of suppressive therapy  · D-mannose 2000 mg daily  · Vitamin C 1000 mg daily  · Cranberry 500 mg daily  · Daily probiotic  · Continue Estrace twice a week   · follow-up 3 months for recheck

## 2023-02-21 ENCOUNTER — OFFICE VISIT (OUTPATIENT)
Dept: FAMILY MEDICINE CLINIC | Facility: CLINIC | Age: 73
End: 2023-02-21

## 2023-02-21 VITALS
WEIGHT: 142 LBS | OXYGEN SATURATION: 97 % | HEIGHT: 55 IN | BODY MASS INDEX: 32.86 KG/M2 | DIASTOLIC BLOOD PRESSURE: 80 MMHG | SYSTOLIC BLOOD PRESSURE: 100 MMHG | HEART RATE: 72 BPM

## 2023-02-21 DIAGNOSIS — R06.02 SOB (SHORTNESS OF BREATH) ON EXERTION: ICD-10-CM

## 2023-02-21 DIAGNOSIS — M79.602 LEFT ARM PAIN: Primary | ICD-10-CM

## 2023-02-21 DIAGNOSIS — Z12.31 ENCOUNTER FOR SCREENING MAMMOGRAM FOR MALIGNANT NEOPLASM OF BREAST: ICD-10-CM

## 2023-02-21 RX ORDER — GABAPENTIN 100 MG/1
100 CAPSULE ORAL
Qty: 90 CAPSULE | Refills: 0 | Status: SHIPPED | OUTPATIENT
Start: 2023-02-21

## 2023-02-21 NOTE — PROGRESS NOTES
Name: Tessa Mitchell      : 1950      MRN: [id number]  Encounter Provider: WAI Najera  Encounter Date: 2023   Encounter department: [facility] PRIMARY CARE    Assessment & Plan     1  Left arm pain  Assessment & Plan:  Rule out cardiac cause  Get xray of shoulder  Can try low dose gabapentin since mostly occurring at night     Orders:  -     XR shoulder 2+ vw left; Future; Expected date: 2023  -     gabapentin (Neurontin) 100 mg capsule; Take 1 capsule (100 mg total) by mouth daily at bedtime  -     Echo stress test, dobutamine; Future; Expected date: 2023  -     POCT ECG    2  Encounter for screening mammogram for malignant neoplasm of breast  -     Mammo screening bilateral w 3d & cad; Future; Expected date: 2023    3  SOB (shortness of breath) on exertion  Assessment & Plan:  Patient has been having worsening shortness of breath with now left sided heaviness and arm pain  Will get stress echo completed to look for any diastolic dysfunction   EKG was normal     Orders:  -     Echo stress test, dobutamine; Future; Expected date: 2023  -     POCT ECG           Subjective      Patient reports at night time she has discomfort in her left arm  It was worse last night  It settles into her left shoulder region  When she gets up to do her normal activities she feels no pain  She tried changing positions of her arm during sleep and there been no improvement  She has not taken any medications to help this or used anything topical      She feels tingling in the bottom of both feet  It bother her sleep as well  She sleeps on her back because this is what is comfortable  She also feel very fatigued when going from her apartment to the car; she feels like she was running but is not  She does not have any chest pain or shortness of breath related to this  Review of Systems   Constitutional: Negative  Respiratory: Negative for chest tightness  Cardiovascular: Negative for chest pain  Musculoskeletal: Positive for arthralgias and myalgias  Negative for neck pain and neck stiffness  Neurological: Positive for numbness  Psychiatric/Behavioral: Positive for sleep disturbance  Current Outpatient Medications on File Prior to Visit   Medication Sig   • b complex vitamins tablet Take 1 tablet by mouth daily     • calcium carbonate-vitamin D (OSCAL-D) 500 mg-200 units per tablet Take 1 tablet by mouth daily with breakfast   • Collagen Hydrolysate POWD Use     • D-Mannose POWD Take 1 Dose by mouth in the morning   • estradiol (ESTRACE) 0 1 mg/g vaginal cream Apply a pea-sized amount to your finger and placed around the urethral opening and around the vaginal opening twice a week on Wednesday and Sunday   • mirtazapine (REMERON) 7 5 MG tablet Take 1 tablet (7 5 mg total) by mouth daily at bedtime   • Multiple Vitamin (multivitamin) tablet Take 1 tablet by mouth daily   • omeprazole (PriLOSEC) 20 mg delayed release capsule TAKE ONE CAPSULE BY MOUTH DAILY   • rosuvastatin (CRESTOR) 5 mg tablet Take 1 tablet (5 mg total) by mouth daily   • terazosin (HYTRIN) 1 mg capsule Take 1 capsule (1 mg total) by mouth daily at bedtime   • Bone Meal-Vitamin D (BONE MEAL PO) Take by mouth (Patient not taking: Reported on 8/4/2022)       Objective     /80 (BP Location: Right arm, Patient Position: Sitting, Cuff Size: Standard)   Pulse 72   Ht 4' 7" (1 397 m)   Wt 64 4 kg (142 lb)   SpO2 97%   BMI 33 00 kg/m²     Physical Exam  Vitals and nursing note reviewed  Constitutional:       General: She is not in acute distress  Appearance: Normal appearance  She is well-developed  She is obese  She is not ill-appearing, toxic-appearing or diaphoretic  HENT:      Head: Normocephalic and atraumatic  Eyes:      Extraocular Movements: Extraocular movements intact        Conjunctiva/sclera: Conjunctivae normal       Pupils: Pupils are equal, round, and reactive to light  Neck:      Thyroid: No thyromegaly  Cardiovascular:      Rate and Rhythm: Normal rate and regular rhythm  Pulses:           Carotid pulses are 2+ on the right side and 2+ on the left side  Heart sounds: Normal heart sounds, S1 normal and S2 normal    Pulmonary:      Effort: Pulmonary effort is normal       Breath sounds: Normal breath sounds  Musculoskeletal:      Left shoulder: No swelling, tenderness or bony tenderness  Normal range of motion  Normal strength  Right lower leg: No edema  Left lower leg: No edema  Neurological:      Mental Status: She is alert and oriented to person, place, and time  Psychiatric:         Mood and Affect: Mood normal          Behavior: Behavior normal          Thought Content:  Thought content normal          Judgment: Judgment normal        WAI Maya

## 2023-02-24 PROBLEM — M79.602 LEFT ARM PAIN: Status: ACTIVE | Noted: 2023-02-24

## 2023-02-24 PROBLEM — R06.02 SOB (SHORTNESS OF BREATH) ON EXERTION: Status: ACTIVE | Noted: 2023-02-24

## 2023-02-24 NOTE — ASSESSMENT & PLAN NOTE
Rule out cardiac cause     Get xray of shoulder  Can try low dose gabapentin since mostly occurring at night

## 2023-02-24 NOTE — ASSESSMENT & PLAN NOTE
Patient has been having worsening shortness of breath with now left sided heaviness and arm pain     Will get stress echo completed to look for any diastolic dysfunction   EKG was normal

## 2023-02-27 ENCOUNTER — TELEPHONE (OUTPATIENT)
Dept: FAMILY MEDICINE CLINIC | Facility: CLINIC | Age: 73
End: 2023-02-27

## 2023-02-27 DIAGNOSIS — R06.02 SOB (SHORTNESS OF BREATH) ON EXERTION: Primary | ICD-10-CM

## 2023-02-27 DIAGNOSIS — E78.2 MIXED HYPERLIPIDEMIA: ICD-10-CM

## 2023-02-27 NOTE — TELEPHONE ENCOUNTER
Lázaro Yepez [facility] Clerical  The prior authorization request for ECHO Stress test, Dobutamine has been denied through [facility]  Tracking # [id number]  The insurance determined the following:     [x] Does not meet Medical Necessity   [ ] No prior imaging   [ ] PT or conservative treatment incomplete   [ ] [place] Pickup   [ ] Frequency     Denial Rationale: The following notes were requested but have not been sent: clarify why a heart test where you walk   (Exercise Stress Test) without sound wave pictures can not be done  Clinicals uploaded:   Office notes   EKG     Please notify your patient of insurance denial and follow up with the patient for next steps in their treatment plan  Notify central scheduling by calling [phone] to cancel appointment and close this order in Epic       Thank you,   Rolly

## 2023-03-29 ENCOUNTER — HOSPITAL ENCOUNTER (OUTPATIENT)
Dept: MAMMOGRAPHY | Facility: MEDICAL CENTER | Age: 73
Discharge: HOME | End: 2023-03-29

## 2023-03-29 VITALS — WEIGHT: 142 LBS | HEIGHT: 55 IN | BODY MASS INDEX: 32.86 KG/M2

## 2023-03-29 DIAGNOSIS — Z12.31 ENCOUNTER FOR SCREENING MAMMOGRAM FOR MALIGNANT NEOPLASM OF BREAST: ICD-10-CM

## 2023-05-17 DIAGNOSIS — M79.602 LEFT ARM PAIN: ICD-10-CM

## 2023-05-17 DIAGNOSIS — E78.2 MIXED HYPERLIPIDEMIA: ICD-10-CM

## 2023-05-17 RX ORDER — ROSUVASTATIN CALCIUM 5 MG/1
TABLET, COATED ORAL
Qty: 90 TABLET | Refills: 1 | Status: SHIPPED | OUTPATIENT
Start: 2023-05-17

## 2023-05-17 RX ORDER — GABAPENTIN 100 MG/1
CAPSULE ORAL
Qty: 90 CAPSULE | Refills: 0 | Status: SHIPPED | OUTPATIENT
Start: 2023-05-17

## 2023-08-01 ENCOUNTER — OFFICE VISIT (OUTPATIENT)
Dept: FAMILY MEDICINE CLINIC | Facility: CLINIC | Age: 73
End: 2023-08-01
Payer: COMMERCIAL

## 2023-08-01 VITALS
WEIGHT: 147 LBS | DIASTOLIC BLOOD PRESSURE: 70 MMHG | BODY MASS INDEX: 34.02 KG/M2 | OXYGEN SATURATION: 98 % | SYSTOLIC BLOOD PRESSURE: 120 MMHG | HEART RATE: 64 BPM | HEIGHT: 55 IN

## 2023-08-01 DIAGNOSIS — E55.9 VITAMIN D DEFICIENCY: ICD-10-CM

## 2023-08-01 DIAGNOSIS — K21.9 GASTROESOPHAGEAL REFLUX DISEASE WITHOUT ESOPHAGITIS: Primary | ICD-10-CM

## 2023-08-01 DIAGNOSIS — G47.9 SLEEP DISTURBANCE: ICD-10-CM

## 2023-08-01 DIAGNOSIS — E78.2 MIXED HYPERLIPIDEMIA: ICD-10-CM

## 2023-08-01 DIAGNOSIS — R33.9 INCOMPLETE BLADDER EMPTYING: ICD-10-CM

## 2023-08-01 DIAGNOSIS — Z00.00 MEDICARE ANNUAL WELLNESS VISIT, SUBSEQUENT: ICD-10-CM

## 2023-08-01 PROCEDURE — 99214 OFFICE O/P EST MOD 30 MIN: CPT | Performed by: NURSE PRACTITIONER

## 2023-08-01 PROCEDURE — G0439 PPPS, SUBSEQ VISIT: HCPCS | Performed by: NURSE PRACTITIONER

## 2023-08-01 RX ORDER — TERAZOSIN 1 MG/1
1 CAPSULE ORAL
Qty: 90 CAPSULE | Refills: 3 | Status: SHIPPED | OUTPATIENT
Start: 2023-08-01

## 2023-08-01 RX ORDER — MIRTAZAPINE 7.5 MG/1
7.5 TABLET, FILM COATED ORAL
Qty: 30 TABLET | Refills: 6 | Status: SHIPPED | OUTPATIENT
Start: 2023-08-01

## 2023-08-01 RX ORDER — OMEPRAZOLE 20 MG/1
20 CAPSULE, DELAYED RELEASE ORAL DAILY
Qty: 90 CAPSULE | Refills: 1 | Status: SHIPPED | OUTPATIENT
Start: 2023-08-01

## 2023-08-01 NOTE — PATIENT INSTRUCTIONS
Medicare Preventive Visit Patient Instructions  Thank you for completing your Welcome to Medicare Visit or Medicare Annual Wellness Visit today. Your next wellness visit will be due in one year (8/1/2024). The screening/preventive services that you may require over the next 5-10 years are detailed below. Some tests may not apply to you based off risk factors and/or age. Screening tests ordered at today's visit but not completed yet may show as past due. Also, please note that scanned in results may not display below. Preventive Screenings:  Service Recommendations Previous Testing/Comments   Colorectal Cancer Screening  * Colonoscopy    * Fecal Occult Blood Test (FOBT)/Fecal Immunochemical Test (FIT)  * Fecal DNA/Cologuard Test  * Flexible Sigmoidoscopy Age: 43-73 years old   Colonoscopy: every 10 years (may be performed more frequently if at higher risk)  OR  FOBT/FIT: every 1 year  OR  Cologuard: every 3 years  OR  Sigmoidoscopy: every 5 years  Screening may be recommended earlier than age 39 if at higher risk for colorectal cancer. Also, an individualized decision between you and your healthcare provider will decide whether screening between the ages of 77-80 would be appropriate. Colonoscopy: 12/05/2019  FOBT/FIT: Not on file  Cologuard: Not on file  Sigmoidoscopy: Not on file    Screening Current     Breast Cancer Screening Age: 36 years old  Frequency: every 1-2 years  Not required if history of left and right mastectomy Mammogram: 03/29/2023    Screening Current   Cervical Cancer Screening Between the ages of 21-29, pap smear recommended once every 3 years. Between the ages of 32-69, can perform pap smear with HPV co-testing every 5 years.    Recommendations may differ for women with a history of total hysterectomy, cervical cancer, or abnormal pap smears in past. Pap Smear: Not on file    Screening Not Indicated   Hepatitis C Screening Once for adults born between 1945 and 1965  More frequently in patients at high risk for Hepatitis C Hep C Antibody: 08/28/2020    Screening Current   Diabetes Screening 1-2 times per year if you're at risk for diabetes or have pre-diabetes Fasting glucose: 84 mg/dL (9/8/2022)  A1C: No results in last 5 years (No results in last 5 years)  Screening Current   Cholesterol Screening Once every 5 years if you don't have a lipid disorder. May order more often based on risk factors. Lipid panel: 09/08/2022    Screening Not Indicated  History Lipid Disorder     Other Preventive Screenings Covered by Medicare:  Abdominal Aortic Aneurysm (AAA) Screening: covered once if your at risk. You're considered to be at risk if you have a family history of AAA. Lung Cancer Screening: covers low dose CT scan once per year if you meet all of the following conditions: (1) Age 48-67; (2) No signs or symptoms of lung cancer; (3) Current smoker or have quit smoking within the last 15 years; (4) You have a tobacco smoking history of at least 20 pack years (packs per day multiplied by number of years you smoked); (5) You get a written order from a healthcare provider. Glaucoma Screening: covered annually if you're considered high risk: (1) You have diabetes OR (2) Family history of glaucoma OR (3)  aged 48 and older OR (3)  American aged 72 and older  Osteoporosis Screening: covered every 2 years if you meet one of the following conditions: (1) You're estrogen deficient and at risk for osteoporosis based off medical history and other findings; (2) Have a vertebral abnormality; (3) On glucocorticoid therapy for more than 3 months; (4) Have primary hyperparathyroidism; (5) On osteoporosis medications and need to assess response to drug therapy. Last bone density test (DXA Scan): 10/21/2022. HIV Screening: covered annually if you're between the age of 14-79. Also covered annually if you are younger than 13 and older than 72 with risk factors for HIV infection.  For pregnant patients, it is covered up to 3 times per pregnancy. Immunizations:  Immunization Recommendations   Influenza Vaccine Annual influenza vaccination during flu season is recommended for all persons aged >= 6 months who do not have contraindications   Pneumococcal Vaccine   * Pneumococcal conjugate vaccine = PCV13 (Prevnar 13), PCV15 (Vaxneuvance), PCV20 (Prevnar 20)  * Pneumococcal polysaccharide vaccine = PPSV23 (Pneumovax) Adults 20-63 years old: 1-3 doses may be recommended based on certain risk factors  Adults 72 years old: 1-2 doses may be recommended based off what pneumonia vaccine you previously received   Hepatitis B Vaccine 3 dose series if at intermediate or high risk (ex: diabetes, end stage renal disease, liver disease)   Tetanus (Td) Vaccine - COST NOT COVERED BY MEDICARE PART B Following completion of primary series, a booster dose should be given every 10 years to maintain immunity against tetanus. Td may also be given as tetanus wound prophylaxis. Tdap Vaccine - COST NOT COVERED BY MEDICARE PART B Recommended at least once for all adults. For pregnant patients, recommended with each pregnancy. Shingles Vaccine (Shingrix) - COST NOT COVERED BY MEDICARE PART B  2 shot series recommended in those aged 48 and above     Health Maintenance Due:      Topic Date Due    Breast Cancer Screening: Mammogram  03/29/2024    DXA SCAN  10/18/2024    Colorectal Cancer Screening  12/05/2024    Hepatitis C Screening  Completed     Immunizations Due:      Topic Date Due    COVID-19 Vaccine (5 - Moderna series) 07/18/2022    Influenza Vaccine (1) 09/01/2023     Advance Directives   What are advance directives? Advance directives are legal documents that state your wishes and plans for medical care. These plans are made ahead of time in case you lose your ability to make decisions for yourself. Advance directives can apply to any medical decision, such as the treatments you want, and if you want to donate organs. What are the types of advance directives? There are many types of advance directives, and each state has rules about how to use them. You may choose a combination of any of the following:  Living will: This is a written record of the treatment you want. You can also choose which treatments you do not want, which to limit, and which to stop at a certain time. This includes surgery, medicine, IV fluid, and tube feedings. Durable power of  for [facility]): This is a written record that states who you want to make healthcare choices for you when you are unable to make them for yourself. This person, called a proxy, is usually a family member or a friend. You may choose more than 1 proxy. Do not resuscitate (DNR) order:  A DNR order is used in case your heart stops beating or you stop breathing. It is a request not to have certain forms of treatment, such as CPR. A DNR order may be included in other types of advance directives. Medical directive: This covers the care that you want if you are in a coma, near death, or unable to make decisions for yourself. You can list the treatments you want for each condition. Treatment may include pain medicine, surgery, blood transfusions, dialysis, IV or tube feedings, and a ventilator (breathing machine). Values history: This document has questions about your views, beliefs, and how you feel and think about life. This information can help others choose the care that you would choose. Why are advance directives important? An advance directive helps you control your care. Although spoken wishes may be used, it is better to have your wishes written down. Spoken wishes can be misunderstood, or not followed. Treatments may be given even if you do not want them. An advance directive may make it easier for your family to make difficult choices about your care. Fall Prevention    Fall prevention  includes ways to make your home and other areas safer.  It also includes ways you can move more carefully to prevent a fall. Health conditions that cause changes in your blood pressure, vision, or muscle strength and coordination may increase your risk for falls. Medicines may also increase your risk for falls if they make you dizzy, weak, or sleepy. Fall prevention tips:   Stand or sit up slowly. Use assistive devices as directed. Wear shoes that fit well and have soles that . Wear a personal alarm. Stay active. Manage your medical conditions. Home Safety Tips:  Add items to prevent falls in the bathroom. Keep paths clear. Install bright lights in your home. Keep items you use often on shelves within reach. Paint or place reflective tape on the edges of your stairs. Weight Management   Why it is important to manage your weight:  Being overweight increases your risk of health conditions such as heart disease, high blood pressure, type 2 diabetes, and certain types of cancer. It can also increase your risk for osteoarthritis, sleep apnea, and other respiratory problems. Aim for a slow, steady weight loss. Even a small amount of weight loss can lower your risk of health problems. How to lose weight safely:  A safe and healthy way to lose weight is to eat fewer calories and get regular exercise. You can lose up about 1 pound a week by decreasing the number of calories you eat by 500 calories each day. Healthy meal plan for weight management:  A healthy meal plan includes a variety of foods, contains fewer calories, and helps you stay healthy. A healthy meal plan includes the following:  Eat whole-grain foods more often. A healthy meal plan should contain fiber. Fiber is the part of grains, fruits, and vegetables that is not broken down by your body. Whole-grain foods are healthy and provide extra fiber in your diet. Some examples of whole-grain foods are whole-wheat breads and pastas, oatmeal, brown rice, and bulgur.   Eat a variety of vegetables every day. Include dark, leafy greens such as spinach, kale, nils greens, and mustard greens. Eat yellow and orange vegetables such as carrots, sweet potatoes, and winter squash. Eat a variety of fruits every day. Choose fresh or canned fruit (canned in its own juice or light syrup) instead of juice. Fruit juice has very little or no fiber. Eat low-fat dairy foods. Drink fat-free (skim) milk or 1% milk. Eat fat-free yogurt and low-fat cottage cheese. Try low-fat cheeses such as mozzarella and other reduced-fat cheeses. Choose meat and other protein foods that are low in fat. Choose beans or other legumes such as split peas or lentils. Choose fish, skinless poultry (chicken or turkey), or lean cuts of red meat (beef or pork). Before you cook meat or poultry, cut off any visible fat. Use less fat and oil. Try baking foods instead of frying them. Add less fat, such as margarine, sour cream, regular salad dressing and mayonnaise to foods. Eat fewer high-fat foods. Some examples of high-fat foods include french fries, doughnuts, ice cream, and cakes. Eat fewer sweets. Limit foods and drinks that are high in sugar. This includes candy, cookies, regular soda, and sweetened drinks. Exercise:  Exercise at least 30 minutes per day on most days of the week. Some examples of exercise include walking, biking, dancing, and swimming. You can also fit in more physical activity by taking the stairs instead of the elevator or parking farther away from stores. Ask your healthcare provider about the best exercise plan for you. © Copyright [employer] 2018 Information is for End User's use only and may not be sold, redistributed or otherwise used for commercial purposes.  All illustrations and images included in CareNotes® are the copyrighted property of [employer] or [address] [place], [place] [address]ols

## 2023-08-01 NOTE — PROGRESS NOTES
Assessment and Plan:     Problem List Items Addressed This Visit        Digestive    Gastroesophageal reflux disease without esophagitis - Primary     She is stable on omeprazole and sometimes skip doses and does well. Relevant Medications    omeprazole (PriLOSEC) 20 mg delayed release capsule       Other    Vitamin D deficiency     Continues on supplement. Incomplete bladder emptying     Stable on terazosin and follows with urology          Relevant Medications    terazosin (HYTRIN) 1 mg capsule    Sleep disturbance     Does well with remeron at night          Relevant Medications    mirtazapine (REMERON) 7.5 MG tablet    Mixed hyperlipidemia     Patient is on crestor   She is do to have blood work done         Other Visit Diagnoses     Medicare annual wellness visit, subsequent            BMI Counseling: Body mass index is 34.17 kg/m². The BMI is above normal. Nutrition recommendations include decreasing portion sizes, moderation in carbohydrate intake, reducing intake of saturated and trans fat and reducing intake of cholesterol. Exercise recommendations include moderate physical activity 150 minutes/week and strength training exercises. Rationale for BMI follow-up plan is due to patient being overweight or obese. Depression Screening and Follow-up Plan: Patient was screened for depression during today's encounter. They screened negative with a PHQ-2 score of 0. Preventive health issues were discussed with patient, and age appropriate screening tests were ordered as noted in patient's After Visit Summary. Personalized health advice and appropriate referrals for health education or preventive services given if needed, as noted in patient's After Visit Summary. History of Present Illness:     Patient presents for a Medicare Wellness Visit    Patient presents today for routine follow up and medicare visit.    She is not having any problems to discuss    She will be traveling to [place] to help care for her sister. She is on omeprazole which sometimes she skips for up to 3 days without having re occurrence of symptoms. Patient Care Team:  Gabriela Spearing as PCP - General (Family Medicine)  Gabriela Spearing as PCP - PCP-[facility] Attributed-Roster     Review of Systems:     Review of Systems   Constitutional: Negative. Respiratory: Positive for shortness of breath. Negative for cough, chest tightness and wheezing. Cardiovascular: Negative for chest pain and palpitations. Gastrointestinal: Negative for abdominal pain. Neurological: Negative for dizziness and headaches.         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   • Mixed hyperlipidemia   • SOB (shortness of breath) on exertion   • Left arm pain      Past Medical and Surgical History:     Past Medical History:   Diagnosis Date   • Age-related osteoporosis without current pathological fracture 7/25/2018   • Class 1 obesity in adult 3/26/2018   • Gastroesophageal reflux disease without esophagitis 3/26/2018   • Hernia, hiatal    • Insomnia    • Osteoporosis    • Recurrent UTI 5/3/2018   • Stroke (HCC)    • Ulcer, gastric, acute    • Vitamin D deficiency      Past Surgical History:   Procedure Laterality Date   • BREAST BIOPSY Left     1997   • HERNIA REPAIR      umbilical hernia    • TUBAL LIGATION        Family History:     Family History   Problem Relation Age of Onset   • Diabetes Mother    • Heart disease Father    • COPD Father    • Prostate cancer Brother         age unknown   • No Known Problems Sister    • No Known Problems Daughter    • No Known Problems Maternal Grandmother    • No Known Problems Maternal Grandfather    • No Known Problems Paternal Grandmother    • No Known Problems Paternal Grandfather    • No Known Problems Sister    • No Known Problems Sister    • No Known Problems Sister    • No Known Problems Sister    • No Known Problems Sister       Social History:     Social History     Socioeconomic History   • Marital status: /Civil Union     Spouse name: None   • Number of children: None   • Years of education: None   • Highest education level: None   Occupational History   • None   Tobacco Use   • Smoking status: Never   • Smokeless tobacco: Never   • Tobacco comments:     exposed to heavy smoke and chemicals   Vaping Use   • Vaping Use: Never used   Substance and Sexual Activity   • Alcohol use: No   • Drug use: No   • Sexual activity: Never   Other Topics Concern   • None   Social History Narrative    Consumes 1 cup of coffee per day        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     Social Determinants of Health     Financial Resource Strain: Low Risk  (8/1/2023)    Overall Financial Resource Strain (CARDIA)    • Difficulty of Paying Living Expenses: Not very hard   Food Insecurity: Not on file   Transportation Needs: No Transportation Needs (8/1/2023)    PRAPARE - Transportation    • Lack of Transportation (Medical): No    • Lack of Transportation (Non-Medical):  No   Physical Activity: Not on file   Stress: Not on file   Social Connections: Not on file   Intimate Partner Violence: Not on file   Housing Stability: Not on file      Medications and Allergies:     Current Outpatient Medications   Medication Sig Dispense Refill   • b complex vitamins tablet Take 1 tablet by mouth daily       • calcium carbonate-vitamin D (OSCAL-D) 500 mg-200 units per tablet Take 1 tablet by mouth daily with breakfast     • Collagen Hydrolysate POWD Use       • D-Mannose POWD Take 1 Dose by mouth in the morning 100 g 1   • estradiol (ESTRACE) 0.1 mg/g vaginal cream Apply a pea-sized amount to your finger and placed around the urethral opening and around the vaginal opening twice a week on Wednesday and Sunday 42.5 g 10   • gabapentin (NEURONTIN) 100 mg capsule TAKE ONE CAPSULE BY MOUTH EVERY DAY AT BEDTIME 90 capsule 0   • mirtazapine (REMERON) 7.5 MG tablet Take 1 tablet (7.5 mg total) by mouth daily at bedtime 30 tablet 6   • Multiple Vitamin (multivitamin) tablet Take 1 tablet by mouth daily     • omeprazole (PriLOSEC) 20 mg delayed release capsule Take 1 capsule (20 mg total) by mouth daily 90 capsule 1   • rosuvastatin (CRESTOR) 5 mg tablet TAKE ONE TABLET BY MOUTH DAILY 90 tablet 1   • terazosin (HYTRIN) 1 mg capsule Take 1 capsule (1 mg total) by mouth daily at bedtime 90 capsule 3   • Bone Meal-Vitamin D (BONE MEAL PO) Take by mouth (Patient not taking: Reported on 8/4/2022)       No current facility-administered medications for this visit. Allergies   Allergen Reactions   • Ciprofloxacin Shortness Of Breath   • Sulfa Antibiotics Shortness Of Breath     Stress and rash.    • Nitrofurantoin Nausea Only   • Other Nausea Only     macrobid      Immunizations:     Immunization History   Administered Date(s) Administered   • COVID-19 MODERNA VACC 0.5 ML IM 03/24/2021, 04/23/2021, 01/05/2022, 05/23/2022   • Influenza, high dose seasonal 0.7 mL 09/20/2018, 12/18/2019, 09/15/2020, 12/09/2022   • Pneumococcal Conjugate 13-Valent 06/25/2018   • Pneumococcal Polysaccharide PPV23 01/15/2020      Health Maintenance:         Topic Date Due   • Breast Cancer Screening: Mammogram  03/29/2024   • DXA SCAN  10/18/2024   • Colorectal Cancer Screening  12/05/2024   • Hepatitis C Screening  Completed         Topic Date Due   • COVID-19 Vaccine (5 - Moderna series) 07/18/2022   • Influenza Vaccine (1) 09/01/2023      Medicare Screening Tests and Risk Assessments:     Sarah English is here for her Subsequent Wellness visit. Health Risk Assessment:   Patient rates overall health as fair. Patient feels that their physical health rating is same. Patient is satisfied with their life. Eyesight was rated as same. Hearing was rated as same. Patient feels that their emotional and mental health rating is same. Patients states they are never, rarely angry. Patient states they are never, rarely unusually tired/fatigued. Pain experienced in the last 7 days has been none. Patient states that she has experienced no weight loss or gain in last 6 months. Depression Screening:   PHQ-2 Score: 0      Fall Risk Screening: In the past year, patient has experienced: history of falling in past year    Number of falls: 1  Injured during fall?: No    Feels unsteady when standing or walking?: No    Worried about falling?: No      Urinary Incontinence Screening:   Patient has not leaked urine accidently in the last six months. Home Safety:  Patient does not have trouble with stairs inside or outside of their home. Patient has working smoke alarms and has working carbon monoxide detector. Home safety hazards include: none. Nutrition:   Current diet is Regular. Medications:   Patient is currently taking over-the-counter supplements. OTC medications include: see medication list. Patient is able to manage medications. Activities of Daily Living (ADLs)/Instrumental Activities of Daily Living (IADLs):   Walk and transfer into and out of bed and chair?: Yes  Dress and groom yourself?: Yes    Bathe or shower yourself?: Yes    Feed yourself?  Yes  Do your laundry/housekeeping?: Yes  Manage your money, pay your bills and track your expenses?: Yes  Make your own meals?: Yes    Do your own shopping?: Yes    Previous Hospitalizations:   Any hospitalizations or ED visits within the last 12 months?: No      Advance Care Planning: Living will: No    Durable POA for healthcare: No    Advanced directive: No    Advanced directive counseling given: Yes    Five wishes given: Yes      PREVENTIVE SCREENINGS      Cardiovascular Screening:    General: History Lipid Disorder and Screening Current    Due for: Lipid Panel      Diabetes Screening:     General: Screening Current      Colorectal Cancer Screening:     General: Screening Current      Breast Cancer Screening:     General: Screening Current      Cervical Cancer Screening:    General: Screening Not Indicated      Osteoporosis Screening:    General: History Osteoporosis and Screening Current      Abdominal Aortic Aneurysm (AAA) Screening:        General: Screening Not Indicated      Lung Cancer Screening:     General: Screening Not Indicated      Hepatitis C Screening:    General: Screening Current    Screening, Brief Intervention, and Referral to Treatment (SBIRT)    Screening  Typical number of drinks in a day: 0  Typical number of drinks in a week: 0  Interpretation: Low risk drinking behavior. AUDIT-C Screenin) How often did you have a drink containing alcohol in the past year? never  2) How many drinks did you have on a typical day when you were drinking in the past year? 0  3) How often did you have 6 or more drinks on one occasion in the past year? never    AUDIT-C Score: 0  Interpretation: Score 0-2 (female): Negative screen for alcohol misuse    Single Item Drug Screening:  How often have you used an illegal drug (including marijuana) or a prescription medication for non-medical reasons in the past year? never    Single Item Drug Screen Score: 0  Interpretation: Negative screen for possible drug use disorder    Brief Intervention  Alcohol & drug use screenings were reviewed. No concerns regarding substance use disorder identified. Other Counseling Topics:   Calcium and vitamin D intake and regular weightbearing exercise. No results found.      Physical Exam:     BP 120/70 (BP Location: Left arm, Patient Position: Sitting, Cuff Size: Standard)   Pulse 64   Ht 4' 7" (1.397 m)   Wt 66.7 kg (147 lb)   SpO2 98%   BMI 34.17 kg/m²     Physical Exam  Vitals and nursing note reviewed. Constitutional:       General: She is not in acute distress. Appearance: Normal appearance. She is obese. She is not ill-appearing or diaphoretic. HENT:      Head: Normocephalic and atraumatic. Right Ear: External ear normal. No middle ear effusion. There is no impacted cerumen. Left Ear: External ear normal.  No middle ear effusion. There is no impacted cerumen. Eyes:      Extraocular Movements: Extraocular movements intact. Conjunctiva/sclera: Conjunctivae normal.   Cardiovascular:      Rate and Rhythm: Normal rate and regular rhythm. Pulses:           Carotid pulses are 2+ on the right side and 2+ on the left side. Heart sounds: Normal heart sounds, S1 normal and S2 normal. No murmur heard. Pulmonary:      Effort: Pulmonary effort is normal.      Breath sounds: Normal breath sounds. Musculoskeletal:      Right lower leg: No edema. Left lower leg: No edema. Skin:     Coloration: Skin is not pale. Neurological:      Mental Status: She is alert and oriented to person, place, and time. Psychiatric:         Mood and Affect: Mood normal.         Behavior: Behavior normal.         Thought Content:  Thought content normal.         Judgment: Judgment normal.          WAI Funes

## 2023-09-27 ENCOUNTER — HOSPITAL ENCOUNTER (OUTPATIENT)
Dept: INFUSION CENTER | Facility: CLINIC | Age: 73
End: 2023-09-27

## 2023-09-29 ENCOUNTER — APPOINTMENT (OUTPATIENT)
Dept: LAB | Facility: HOSPITAL | Age: 73
End: 2023-09-29
Payer: COMMERCIAL

## 2023-09-29 DIAGNOSIS — E55.9 VITAMIN D DEFICIENCY: ICD-10-CM

## 2023-09-29 DIAGNOSIS — E78.2 MIXED HYPERLIPIDEMIA: ICD-10-CM

## 2023-09-29 LAB
25(OH)D3 SERPL-MCNC: 84.9 NG/ML (ref 30–100)
ALBUMIN SERPL BCP-MCNC: 4.3 G/DL (ref 3.5–5)
ALP SERPL-CCNC: 74 U/L (ref 34–104)
ALT SERPL W P-5'-P-CCNC: 14 U/L (ref 7–52)
ANION GAP SERPL CALCULATED.3IONS-SCNC: 6 MMOL/L
AST SERPL W P-5'-P-CCNC: 18 U/L (ref 13–39)
BILIRUB SERPL-MCNC: 0.76 MG/DL (ref 0.2–1)
BUN SERPL-MCNC: 14 MG/DL (ref 5–25)
CALCIUM SERPL-MCNC: 9.9 MG/DL (ref 8.4–10.2)
CHLORIDE SERPL-SCNC: 105 MMOL/L (ref 96–108)
CHOLEST SERPL-MCNC: 148 MG/DL
CO2 SERPL-SCNC: 27 MMOL/L (ref 21–32)
CREAT SERPL-MCNC: 0.86 MG/DL (ref 0.6–1.3)
GFR SERPL CREATININE-BSD FRML MDRD: 67 ML/MIN/1.73SQ M
GLUCOSE P FAST SERPL-MCNC: 99 MG/DL (ref 65–99)
HDLC SERPL-MCNC: 65 MG/DL
LDLC SERPL CALC-MCNC: 51 MG/DL (ref 0–100)
NONHDLC SERPL-MCNC: 83 MG/DL
POTASSIUM SERPL-SCNC: 4.2 MMOL/L (ref 3.5–5.3)
PROT SERPL-MCNC: 7.5 G/DL (ref 6.4–8.4)
SODIUM SERPL-SCNC: 138 MMOL/L (ref 135–147)
TRIGL SERPL-MCNC: 160 MG/DL
TSH SERPL DL<=0.05 MIU/L-ACNC: 1.27 UIU/ML (ref 0.45–4.5)

## 2023-09-29 PROCEDURE — 36415 COLL VENOUS BLD VENIPUNCTURE: CPT

## 2023-09-29 PROCEDURE — 84443 ASSAY THYROID STIM HORMONE: CPT

## 2023-09-29 PROCEDURE — 80053 COMPREHEN METABOLIC PANEL: CPT

## 2023-09-29 PROCEDURE — 80061 LIPID PANEL: CPT

## 2023-09-29 PROCEDURE — 82306 VITAMIN D 25 HYDROXY: CPT

## 2023-10-02 ENCOUNTER — HOSPITAL ENCOUNTER (OUTPATIENT)
Dept: INFUSION CENTER | Facility: CLINIC | Age: 73
Discharge: HOME | End: 2023-10-02
Payer: COMMERCIAL

## 2023-10-02 VITALS
DIASTOLIC BLOOD PRESSURE: 81 MMHG | RESPIRATION RATE: 16 BRPM | TEMPERATURE: 98.9 F | SYSTOLIC BLOOD PRESSURE: 136 MMHG | HEART RATE: 64 BPM

## 2023-10-02 DIAGNOSIS — M81.0 AGE-RELATED OSTEOPOROSIS WITHOUT CURRENT PATHOLOGICAL FRACTURE: Primary | ICD-10-CM

## 2023-10-02 PROCEDURE — 96374 THER/PROPH/DIAG INJ IV PUSH: CPT

## 2023-10-02 RX ORDER — ZOLEDRONIC ACID 5 MG/100ML
5 INJECTION, SOLUTION INTRAVENOUS ONCE
OUTPATIENT
Start: 2024-08-14

## 2023-10-02 RX ORDER — SODIUM CHLORIDE 9 MG/ML
20 INJECTION, SOLUTION INTRAVENOUS ONCE
OUTPATIENT
Start: 2024-08-14

## 2023-10-02 RX ORDER — SODIUM CHLORIDE 9 MG/ML
20 INJECTION, SOLUTION INTRAVENOUS ONCE
Status: COMPLETED | OUTPATIENT
Start: 2023-10-02 | End: 2023-10-02

## 2023-10-02 RX ORDER — ZOLEDRONIC ACID 5 MG/100ML
5 INJECTION, SOLUTION INTRAVENOUS ONCE
Status: COMPLETED | OUTPATIENT
Start: 2023-10-02 | End: 2023-10-02

## 2023-10-02 RX ADMIN — SODIUM CHLORIDE 20 ML/HR: 0.9 INJECTION, SOLUTION INTRAVENOUS at 11:31

## 2023-10-02 RX ADMIN — ZOLEDRONIC ACID 5 MG: 0.05 INJECTION, SOLUTION INTRAVENOUS at 11:31

## 2023-10-02 NOTE — PROGRESS NOTES
Pt presents for reclast. No new meds or concerns. Pt tolerated treatment without adverse reaction. Future appointments discussed. AVS and reclast info from [facility] given.

## 2023-10-02 NOTE — PATIENT INSTRUCTIONS
Por favor Theadora Fly a king dentista que tomaste Reclast hoy si vas a tener procedimientos dentales.

## 2023-10-30 ENCOUNTER — RA CDI HCC (OUTPATIENT)
Dept: OTHER | Facility: HOSPITAL | Age: 73
End: 2023-10-30

## 2023-10-30 NOTE — PROGRESS NOTES
[address] [facility] coding opportunities       Chart reviewed, no opportunity found: CHART REVIEWED, NO OPPORTUNITY FOUND        Patients Insurance        Commercial Insurance: Uriel Maldonado

## 2023-11-07 ENCOUNTER — HOSPITAL ENCOUNTER (EMERGENCY)
Facility: HOSPITAL | Age: 73
Discharge: HOME | End: 2023-11-08
Attending: EMERGENCY MEDICINE
Payer: COMMERCIAL

## 2023-11-07 ENCOUNTER — APPOINTMENT (EMERGENCY)
Dept: RADIOLOGY | Facility: HOSPITAL | Age: 73
End: 2023-11-07
Payer: COMMERCIAL

## 2023-11-07 DIAGNOSIS — U07.1 COVID: ICD-10-CM

## 2023-11-07 DIAGNOSIS — J06.9 VIRAL URI WITH COUGH: Primary | ICD-10-CM

## 2023-11-07 PROCEDURE — 71046 X-RAY EXAM CHEST 2 VIEWS: CPT

## 2023-11-07 PROCEDURE — 93005 ELECTROCARDIOGRAM TRACING: CPT

## 2023-11-07 PROCEDURE — 99285 EMERGENCY DEPT VISIT HI MDM: CPT | Performed by: EMERGENCY MEDICINE

## 2023-11-07 PROCEDURE — 94640 AIRWAY INHALATION TREATMENT: CPT

## 2023-11-07 PROCEDURE — 0241U HB NFCT DS VIR RESP RNA 4 TRGT: CPT | Performed by: EMERGENCY MEDICINE

## 2023-11-07 PROCEDURE — 99284 EMERGENCY DEPT VISIT MOD MDM: CPT

## 2023-11-07 RX ORDER — ACETAMINOPHEN 325 MG/1
650 TABLET ORAL ONCE
Status: COMPLETED | OUTPATIENT
Start: 2023-11-07 | End: 2023-11-07

## 2023-11-07 RX ORDER — PREDNISONE 20 MG/1
40 TABLET ORAL ONCE
Status: COMPLETED | OUTPATIENT
Start: 2023-11-07 | End: 2023-11-07

## 2023-11-07 RX ORDER — IPRATROPIUM BROMIDE AND ALBUTEROL SULFATE 2.5; .5 MG/3ML; MG/3ML
3 SOLUTION RESPIRATORY (INHALATION) ONCE
Status: COMPLETED | OUTPATIENT
Start: 2023-11-07 | End: 2023-11-07

## 2023-11-07 RX ORDER — BENZONATATE 100 MG/1
100 CAPSULE ORAL ONCE
Status: COMPLETED | OUTPATIENT
Start: 2023-11-07 | End: 2023-11-07

## 2023-11-07 RX ADMIN — ACETAMINOPHEN 325MG 650 MG: 325 TABLET ORAL at 23:55

## 2023-11-07 RX ADMIN — PREDNISONE 40 MG: 20 TABLET ORAL at 23:55

## 2023-11-07 RX ADMIN — IPRATROPIUM BROMIDE AND ALBUTEROL SULFATE 3 ML: 2.5; .5 SOLUTION RESPIRATORY (INHALATION) at 23:57

## 2023-11-07 RX ADMIN — BENZONATATE 100 MG: 100 CAPSULE ORAL at 23:55

## 2023-11-08 VITALS
TEMPERATURE: 99.4 F | WEIGHT: 142.64 LBS | OXYGEN SATURATION: 97 % | HEART RATE: 75 BPM | RESPIRATION RATE: 18 BRPM | DIASTOLIC BLOOD PRESSURE: 61 MMHG | BODY MASS INDEX: 33.15 KG/M2 | SYSTOLIC BLOOD PRESSURE: 125 MMHG

## 2023-11-08 LAB
ATRIAL RATE: 72 BPM
FLUAV RNA RESP QL NAA+PROBE: NEGATIVE
FLUBV RNA RESP QL NAA+PROBE: NEGATIVE
P AXIS: 48 DEGREES
PR INTERVAL: 144 MS
QRS AXIS: -42 DEGREES
QRSD INTERVAL: 78 MS
QT INTERVAL: 394 MS
QTC INTERVAL: 431 MS
RSV RNA RESP QL NAA+PROBE: NEGATIVE
SARS-COV-2 RNA RESP QL NAA+PROBE: POSITIVE
T WAVE AXIS: 89 DEGREES
VENTRICULAR RATE: 72 BPM

## 2023-11-08 PROCEDURE — 93010 ELECTROCARDIOGRAM REPORT: CPT | Performed by: STUDENT IN AN ORGANIZED HEALTH CARE EDUCATION/TRAINING PROGRAM

## 2023-11-08 RX ORDER — BENZONATATE 100 MG/1
100 CAPSULE ORAL EVERY 8 HOURS
Qty: 21 CAPSULE | Refills: 0 | Status: ON HOLD | OUTPATIENT
Start: 2023-11-08

## 2023-11-08 RX ORDER — PREDNISONE 20 MG/1
40 TABLET ORAL DAILY
Qty: 8 TABLET | Refills: 0 | Status: SHIPPED | OUTPATIENT
Start: 2023-11-08 | End: 2023-11-12

## 2023-11-08 NOTE — ED PROVIDER NOTES
History  Chief Complaint   Patient presents with    Cough     Cough, fever dry cough, runny nose and headache since last night      Patient is a 81-year-old female with a history of hypercholesteremia, GERD, coming in today with cough that started approximately 4 days ago. She states that her  has similar symptoms but unsure what he has. She reports a productive cough but cannot tell me what color the sputum is. She has no chest pain, shortness of breath, palpitations or syncope. No dyspnea on exertion. No conversational dyspnea. She has not taken anything for this. She also complains of nasal congestion. No recent travel or sick contacts. She does not smoke and does not have any history of asthma or COPD. She does not have any sore throat or ear pain. History provided by:  Patient, medical records and relative   used: No    Cough  Cough characteristics:  Productive  Sputum characteristics:  Unable to specify  Severity:  Mild  Onset quality:  Gradual  Duration:  4 days  Timing:  Constant  Progression:  Worsening  Chronicity:  New  Smoker: no    Context: sick contacts    Context: not animal exposure, not exposure to allergens, not fumes, not occupational exposure, not smoke exposure, not upper respiratory infection, not weather changes and not with activity    Relieved by:  None tried  Worsened by:  Nothing  Ineffective treatments:  None tried  Associated symptoms: chills, headaches and sinus congestion    Associated symptoms: no chest pain, no diaphoresis, no ear fullness, no ear pain, no eye discharge, no fever, no myalgias, no rash, no rhinorrhea, no shortness of breath, no sore throat, no weight loss and no wheezing    Risk factors: no chemical exposure, no recent infection and no recent travel        Prior to Admission Medications   Prescriptions Last Dose Informant Patient Reported? Taking?    Bone Meal-Vitamin D (BONE MEAL PO)  Self Yes No   Sig: Take by mouth Collagen Hydrolysate POWD  Self Yes No   Sig: Use     D-Mannose POWD  Self No Yes   Sig: Take 1 Dose by mouth in the morning   Multiple Vitamin (multivitamin) tablet  Self Yes Yes   Sig: Take 1 tablet by mouth daily   b complex vitamins tablet  Self Yes Yes   Sig: Take 1 tablet by mouth daily     calcium carbonate-vitamin D (OSCAL-D) 500 mg-200 units per tablet  Self Yes Yes   Sig: Take 1 tablet by mouth daily with breakfast   estradiol (ESTRACE) 0.1 mg/g vaginal cream  Self No No   Sig: Apply a pea-sized amount to your finger and placed around the urethral opening and around the vaginal opening twice a week on Wednesday and Sunday   gabapentin (NEURONTIN) 100 mg capsule  Self No Yes   Sig: TAKE ONE CAPSULE BY MOUTH EVERY DAY AT BEDTIME   mirtazapine (REMERON) 7.5 MG tablet   No Yes   Sig: Take 1 tablet (7.5 mg total) by mouth daily at bedtime   omeprazole (PriLOSEC) 20 mg delayed release capsule   No Yes   Sig: Take 1 capsule (20 mg total) by mouth daily   rosuvastatin (CRESTOR) 5 mg tablet  Self No Yes   Sig: TAKE ONE TABLET BY MOUTH DAILY   terazosin (HYTRIN) 1 mg capsule   No No   Sig: Take 1 capsule (1 mg total) by mouth daily at bedtime      Facility-Administered Medications: None       Past Medical History:   Diagnosis Date    Age-related osteoporosis without current pathological fracture 7/25/2018    Class 1 obesity in adult 3/26/2018    Gastroesophageal reflux disease without esophagitis 3/26/2018    Hernia, hiatal     Insomnia     Osteoporosis     Recurrent UTI 5/3/2018    Stroke (HCC)     Ulcer, gastric, acute     Vitamin D deficiency        Past Surgical History:   Procedure Laterality Date    BREAST BIOPSY Left     1997    HERNIA REPAIR      umbilical hernia     TUBAL LIGATION         Family History   Problem Relation Age of Onset    Diabetes Mother     Heart disease Father     COPD Father     Prostate cancer Brother         age unknown    No Known Problems Sister     No Known Problems Daughter     No Known Problems Maternal Grandmother     No Known Problems Maternal Grandfather     No Known Problems Paternal Grandmother     No Known Problems Paternal Grandfather     No Known Problems Sister     No Known Problems Sister     No Known Problems Sister     No Known Problems Sister     No Known Problems Sister      I have reviewed and agree with the history as documented. E-Cigarette/Vaping    E-Cigarette Use Never User      E-Cigarette/Vaping Substances     Social History     Tobacco Use    Smoking status: Never    Smokeless tobacco: Never    Tobacco comments:     exposed to heavy smoke and chemicals   Vaping Use    Vaping Use: Never used   Substance Use Topics    Alcohol use: No    Drug use: No       Review of Systems   Constitutional:  Positive for chills. Negative for diaphoresis, fever and weight loss. HENT:  Negative for ear pain, rhinorrhea and sore throat. Eyes:  Negative for pain, discharge and visual disturbance. Respiratory:  Positive for cough. Negative for shortness of breath and wheezing. Cardiovascular:  Negative for chest pain and palpitations. Gastrointestinal:  Negative for abdominal pain and vomiting. Genitourinary:  Negative for dysuria and hematuria. Musculoskeletal:  Negative for arthralgias, back pain and myalgias. Skin:  Negative for color change and rash. Neurological:  Positive for headaches. Negative for seizures and syncope. All other systems reviewed and are negative. Physical Exam  Physical Exam  Vitals and nursing note reviewed. Constitutional:       General: She is not in acute distress. Appearance: She is well-developed. HENT:      Head: Normocephalic and atraumatic. Comments: Patient maintaining airway and secretions. No stridor . No brawniness under tongue. Mouth/Throat:      Mouth: Mucous membranes are moist.   Eyes:      Extraocular Movements: Extraocular movements intact.       Conjunctiva/sclera: Conjunctivae normal.      Pupils: Pupils are equal, round, and reactive to light. Cardiovascular:      Rate and Rhythm: Normal rate and regular rhythm. Pulses:           Radial pulses are 2+ on the right side and 2+ on the left side. Dorsalis pedis pulses are 2+ on the right side and 2+ on the left side. Heart sounds: Normal heart sounds, S1 normal and S2 normal. No murmur heard. Pulmonary:      Effort: Pulmonary effort is normal. No respiratory distress. Breath sounds: Examination of the right-lower field reveals decreased breath sounds. Examination of the left-lower field reveals decreased breath sounds. Decreased breath sounds present. Comments: No conversational dyspnea  Abdominal:      Palpations: Abdomen is soft. Tenderness: There is no abdominal tenderness. Musculoskeletal:         General: No swelling. Cervical back: Neck supple. Right lower leg: No edema. Left lower leg: No edema. Skin:     General: Skin is warm and dry. Capillary Refill: Capillary refill takes less than 2 seconds. Neurological:      General: No focal deficit present. Mental Status: She is alert and oriented to person, place, and time. GCS: GCS eye subscore is 4. GCS verbal subscore is 5. GCS motor subscore is 6. Cranial Nerves: Cranial nerves 2-12 are intact. Sensory: Sensation is intact. Motor: Motor function is intact. Coordination: Coordination is intact. Comments: No slurred speech. No facial asymmetry. No tongue deviation   Psychiatric:         Mood and Affect: Mood normal.         Behavior: Behavior normal.         Thought Content:  Thought content normal.         Judgment: Judgment normal.         Vital Signs  ED Triage Vitals   Temperature Pulse Respirations Blood Pressure SpO2   11/07/23 2206 11/07/23 2206 11/07/23 2206 11/07/23 2206 11/07/23 2206   99.4 °F (37.4 °C) 89 18 140/68 97 %      Temp Source Heart Rate Source Patient Position - Orthostatic VS BP Location FiO2 (%)   11/07/23 2206 11/07/23 2206 11/07/23 2206 11/07/23 2206 --   Oral Monitor Sitting Right arm       Pain Score       11/08/23 0034       No Pain           Vitals:    11/07/23 2206 11/08/23 0034   BP: 140/68 125/61   Pulse: 89 75   Patient Position - Orthostatic VS: Sitting Lying         Visual Acuity      ED Medications  Medications   acetaminophen (TYLENOL) tablet 650 mg (650 mg Oral Given 11/7/23 2355)   ipratropium-albuterol (DUO-NEB) 0.5-2.5 mg/3 mL inhalation solution 3 mL (3 mL Nebulization Given 11/7/23 2357)   predniSONE tablet 40 mg (40 mg Oral Given 11/7/23 2355)   benzonatate (TESSALON PERLES) capsule 100 mg (100 mg Oral Given 11/7/23 2355)       Diagnostic Studies  Results Reviewed       Procedure Component Value Units Date/Time    FLU/RSV/COVID - if FLU/RSV clinically relevant [[id number]]  (Abnormal) Collected: 11/07/23 2354    Lab Status: Final result Specimen: Nares from Nose Updated: 11/08/23 0154     SARS-CoV-2 Positive     INFLUENZA A PCR Negative     INFLUENZA B PCR Negative     RSV PCR Negative    Narrative:      FOR PEDIATRIC PATIENTS - copy/paste COVID Guidelines URL to browser: [email or web address]    SARS-CoV-2 assay is a Nucleic Acid Amplification assay intended for the  qualitative detection of nucleic acid from SARS-CoV-2 in nasopharyngeal  swabs. Results are for the presumptive identification of SARS-CoV-2 RNA. Positive results are indicative of infection with SARS-CoV-2, the virus  causing COVID-19, but do not rule out bacterial infection or co-infection  with other viruses. Laboratories within the [facility] and its  territories are required to report all positive results to the appropriate  public health authorities. Negative results do not preclude SARS-CoV-2  infection and should not be used as the sole basis for treatment or other  patient management decisions.  Negative results must be combined with  clinical observations, patient history, and epidemiological information. This test has not been FDA cleared or approved. This test has been authorized by FDA under an Emergency Use Authorization  (EUA). This test is only authorized for the duration of time the  declaration that circumstances exist justifying the authorization of the  emergency use of an in vitro diagnostic tests for detection of SARS-CoV-2  virus and/or diagnosis of COVID-19 infection under section 564(b)(1) of  the Act, 21 U. S.C. 030ZRJ-9(H)(2), unless the authorization is terminated  or revoked sooner. The test has been validated but independent review by FDA  and CLIA is pending. Test performed using [employer] Gene[employer]pert: This RT-PCR assay targets N2,  a region unique to SARS-CoV-2. A conserved region in the E-gene was chosen  for pan-Sarbecovirus detection which includes SARS-CoV-2. According to CMS-2020-01-R, this platform meets the definition of high-throughput technology. XR chest 2 views   ED Interpretation by Ifrah Bryant DO (11/07 2342)   Kyphosis. No acute pathology                 Procedures  Procedures         ED Course  ED Course as of 11/08/23 0219   Tue Nov 07, 2023   2288 Patient is a 68year old female coming in today with daughter at bedside for persistent cough that is progressively worsened. On exam she is not hypoxic, does not meet sepsis criteria. She is well-appearing in no acute respiratory distress. Will obtain chest x-ray, EKG flu COVID as well as give DuoNeb, prednisone as well as Tessalon Perles and Tylenol      Disclosure: Voice to text software was used in the preparation of this document and could have resulted in translational errors. Occasional wrong word or "sound a like" substitutions may have occurred due to the inherent limitations of voice recognition software. Read the chart carefully and recognize, using context, where substitutions have occurred.        I have independently reviewed external records are available to me to the level of detail possible within the time constraints of my patient care responsibilities in the ED.       2355 EKG without ischemia or arrhythmia chest x-ray clear. Wed Nov 08, 2023   [facility]. Patient is greater than 48 hours out. She is dynamically stable and not hypoxic. Will DC home. We did give her Tessalon Perles and steroid here. Will DC home with steroid, Tessalon Perles and MDI                               SBIRT 20yo+      Flowsheet Row Most Recent Value   Initial Alcohol Screen: US AUDIT-C     1. How often do you have a drink containing alcohol? 0 Filed at: 11/08/2023 0037   2. How many drinks containing alcohol do you have on a typical day you are drinking? 0 Filed at: 11/08/2023 0037   3a. Male UNDER 65: How often do you have five or more drinks on one occasion? 0 Filed at: 11/08/2023 0037   3b. FEMALE Any Age, or MALE 65+: How often do you have 4 or more drinks on one occassion? 0 Filed at: 11/08/2023 0037   Audit-C Score 0 Filed at: 11/08/2023 5120   RAY: How many times in the past year have you. .. Used an illegal drug or used a prescription medication for non-medical reasons? Never Filed at: 11/08/2023 0037                      Medical Decision Making  Differential diagnosis of cough includes but not limited to, influenza, parainfluenza, other viruses, pneumonia/bacterial pneumonia, bronchitis, medication related, toxic, asthma, asthma exacerbation, CHF, COPD, GERD acid reflux, postnasal drip, allergies foreign body aspiration, otitis media, pharyngitis viral and/or bacterial       EKG INTERPRETATION @ 2353  RHYTHM: Normal sinus rhythm at 72 bpm  AXIS: Left axis deviation  INTERVALS: TN interval measured at 144 ms  QRS COMPLEX: QRS measured at 78 ms  ST SEGMENT: Nonspecific ST segment changes. Diffuse artifact.   Diffuse T wave flattening  QT INTERVAL: QTc measured at 431 ms  COMPARED WITH PRIOR no old to compare  Interpretation by Terrie Huerta,       Amount and/or Complexity of Data Reviewed  Independent Historian: caregiver     Details: daughter  Radiology: ordered and independent interpretation performed. Decision-making details documented in ED Course. Details: CXR interpreted by myself as no acute pathology  ECG/medicine tests: ordered and independent interpretation performed. Decision-making details documented in ED Course. Details: No ischemia or arrhythmia    Risk  OTC drugs. Prescription drug management. Disposition  Final diagnoses:   Viral URI with cough   COVID     Time reflects when diagnosis was documented in both MDM as applicable and the Disposition within this note       Time User Action Codes Description Comment    11/8/2023  1:56 AM Aliyah Barreto Add [J06.9] Viral URI with cough     11/8/2023  1:56 AM Mary Lou, [address] [place] [address],[address] [U07.1] [facility]           ED Disposition       ED Disposition   Discharge    Condition   Stable    Date/Time   Wed Nov 8, 2023 [address] discharge to home/self care. Follow-up Information       Follow up With Specialties Details Why Contact Info    Roxanna Araujo, [address] [facility], Nurse Practitioner Schedule an appointment as soon as possible for a visit in 1 week  [address] [place] [id number] [phone]              Patient's Medications   Discharge Prescriptions    BENZONATATE (TESSALON PERLES) 100 MG CAPSULE    Take 1 capsule (100 mg total) by mouth every 8 (eight) hours       Start Date: 11/8/2023 End Date: --       Order Dose: 100 mg       Quantity: 21 capsule    Refills: 0    PREDNISONE 20 MG TABLET    Take 2 tablets (40 mg total) by mouth daily for 4 days       Start Date: 11/8/2023 End Date: 11/12/2023       Order Dose: 40 mg       Quantity: 8 tablet    Refills: 0       No discharge procedures on file.     PDMP Review       None            ED Provider  Electronically Signed by             Nazia Ibarra [address] [place] [address],   11/08/23 5555

## 2023-11-16 ENCOUNTER — HOSPITAL ENCOUNTER (EMERGENCY)
Facility: HOSPITAL | Age: 73
Discharge: HOME | DRG: 871 | End: 2023-11-16
Attending: EMERGENCY MEDICINE
Payer: COMMERCIAL

## 2023-11-16 ENCOUNTER — APPOINTMENT (EMERGENCY)
Dept: RADIOLOGY | Facility: HOSPITAL | Age: 73
DRG: 871 | End: 2023-11-16
Payer: COMMERCIAL

## 2023-11-16 VITALS
BODY MASS INDEX: 32.64 KG/M2 | WEIGHT: 140.43 LBS | TEMPERATURE: 99 F | RESPIRATION RATE: 18 BRPM | HEART RATE: 109 BPM | SYSTOLIC BLOOD PRESSURE: 185 MMHG | OXYGEN SATURATION: 97 % | DIASTOLIC BLOOD PRESSURE: 75 MMHG

## 2023-11-16 DIAGNOSIS — J06.9 UPPER RESPIRATORY TRACT INFECTION, UNSPECIFIED TYPE: Primary | ICD-10-CM

## 2023-11-16 PROCEDURE — 71045 X-RAY EXAM CHEST 1 VIEW: CPT

## 2023-11-16 PROCEDURE — 99284 EMERGENCY DEPT VISIT MOD MDM: CPT | Performed by: EMERGENCY MEDICINE

## 2023-11-16 PROCEDURE — 99283 EMERGENCY DEPT VISIT LOW MDM: CPT

## 2023-11-17 NOTE — ED PROVIDER NOTES
History  Chief Complaint   Patient presents with    Cold Like Symptoms     Pt reports sore throat, cough, back/rib pain, SOB, chills, weird taste in mouth, and not being able to smell anything for 2 days. Here 2 weeks ago and tx for URI but symptoms persist.      81-year-old female, presents with sore throat, cough, and chills. Patient states she had similar symptoms about 2 weeks ago and was seen here, symptoms slightly improved but worse over the past 2 days. History provided by:  Patient   used: No        Prior to Admission Medications   Prescriptions Last Dose Informant Patient Reported? Taking?    Bone Meal-Vitamin D (BONE MEAL PO)  Self Yes No   Sig: Take by mouth   Collagen Hydrolysate POWD  Self Yes No   Sig: Use     D-Mannose POWD  Self No No   Sig: Take 1 Dose by mouth in the morning   Multiple Vitamin (multivitamin) tablet  Self Yes No   Sig: Take 1 tablet by mouth daily   b complex vitamins tablet  Self Yes No   Sig: Take 1 tablet by mouth daily     benzonatate (TESSALON PERLES) 100 mg capsule   No No   Sig: Take 1 capsule (100 mg total) by mouth every 8 (eight) hours   calcium carbonate-vitamin D (OSCAL-D) 500 mg-200 units per tablet  Self Yes No   Sig: Take 1 tablet by mouth daily with breakfast   estradiol (ESTRACE) 0.1 mg/g vaginal cream  Self No No   Sig: Apply a pea-sized amount to your finger and placed around the urethral opening and around the vaginal opening twice a week on Wednesday and Sunday   gabapentin (NEURONTIN) 100 mg capsule  Self No No   Sig: TAKE ONE CAPSULE BY MOUTH EVERY DAY AT BEDTIME   mirtazapine (REMERON) 7.5 MG tablet   No No   Sig: Take 1 tablet (7.5 mg total) by mouth daily at bedtime   omeprazole (PriLOSEC) 20 mg delayed release capsule   No No   Sig: Take 1 capsule (20 mg total) by mouth daily   rosuvastatin (CRESTOR) 5 mg tablet  Self No No   Sig: TAKE ONE TABLET BY MOUTH DAILY   terazosin (HYTRIN) 1 mg capsule   No No   Sig: Take 1 capsule (1 mg total) by mouth daily at bedtime      Facility-Administered Medications: None       Past Medical History:   Diagnosis Date    Age-related osteoporosis without current pathological fracture 7/25/2018    Class 1 obesity in adult 3/26/2018    Gastroesophageal reflux disease without esophagitis 3/26/2018    Hernia, hiatal     Insomnia     Osteoporosis     Recurrent UTI 5/3/2018    Stroke ([address])     Ulcer, gastric, acute     Vitamin D deficiency        Past Surgical History:   Procedure Laterality Date    BREAST BIOPSY Left     1997    HERNIA REPAIR      umbilical hernia     TUBAL LIGATION         Family History   Problem Relation Age of Onset    Diabetes Mother     Heart disease Father     COPD Father     Prostate cancer Brother         age unknown    No Known Problems Sister     No Known Problems Daughter     No Known Problems Maternal Grandmother     No Known Problems Maternal Grandfather     No Known Problems Paternal Grandmother     No Known Problems Paternal Grandfather     No Known Problems Sister     No Known Problems Sister     No Known Problems Sister     No Known Problems Sister     No Known Problems Sister      I have reviewed and agree with the history as documented. E-Cigarette/Vaping    E-Cigarette Use Never User      E-Cigarette/Vaping Substances     Social History     Tobacco Use    Smoking status: Never    Smokeless tobacco: Never    Tobacco comments:     exposed to heavy smoke and chemicals   Vaping Use    Vaping Use: Never used   Substance Use Topics    Alcohol use: No    Drug use: No       Review of Systems   Constitutional:  Positive for chills. HENT:  Positive for congestion and sore throat. Eyes: Negative. Respiratory:  Positive for cough. Gastrointestinal: Negative. Skin: Negative. Neurological: Negative. Physical Exam  Physical Exam  Vitals and nursing note reviewed. Constitutional:       General: She is not in acute distress.   HENT:      Head: Normocephalic and atraumatic. Right Ear: Ear canal normal.      Left Ear: Ear canal normal.      Ears:      Comments: Clear fluid behind right TM, no erythema or bulging  Unable to visualize left TM due to cerumen     Mouth/Throat:      Mouth: Mucous membranes are moist.      Pharynx: Oropharynx is clear. No oropharyngeal exudate or posterior oropharyngeal erythema. Eyes:      Extraocular Movements: Extraocular movements intact. Conjunctiva/sclera: Conjunctivae normal.      Pupils: Pupils are equal, round, and reactive to light. Cardiovascular:      Rate and Rhythm: Regular rhythm. Tachycardia present. Pulmonary:      Effort: Pulmonary effort is normal.      Breath sounds: Normal breath sounds. No wheezing. Musculoskeletal:         General: Normal range of motion. Cervical back: Normal range of motion and neck supple. No rigidity. Skin:     General: Skin is warm and dry. Neurological:      General: No focal deficit present. Mental Status: She is alert and oriented to person, place, and time. Motor: No weakness. Gait: Gait normal.         Vital Signs  ED Triage Vitals [11/16/23 1935]   Temperature Pulse Respirations Blood Pressure SpO2   99 °F (37.2 °C) (!) 109 18 (!) 185/75 97 %      Temp Source Heart Rate Source Patient Position - Orthostatic VS BP Location FiO2 (%)   Oral Monitor Sitting Right arm --      Pain Score       --           Vitals:    11/16/23 1935   BP: (!) 185/75   Pulse: (!) 109   Patient Position - Orthostatic VS: Sitting         Visual Acuity      ED Medications  Medications - No data to display    Diagnostic Studies  Results Reviewed       None                   XR chest 1 view portable    (Results Pending)              Procedures  Procedures         ED Course  ED Course as of 11/16/23 2101   Thu Nov 16, 2023 2048 Chest x-ray reviewed and compared to previous, no infiltrate or effusion, no acute findings.    2100 Patient comfortable in ED, normal respiratory efforts, clear lungs.  Discussed with patient and daughter that her symptoms are likely prolonged viral illness or that she has developed a new viral URI. No indication for antibiotics or antiviral treatment at this time. Patient feels comfortable being discharged home, instructed to follow-up with primary doctor, follow-up or return for any worsening or new concerning symptoms. Medical Decision Making  44-year-old female, presenting with cough and congestion. Differential diagnosis includes pneumonia, bronchitis, URI among other diagnoses. Chest x-ray ordered. Amount and/or Complexity of Data Reviewed  External Data Reviewed: radiology and notes. Details: Previous ED visit and chest x-ray reviewed  Radiology: ordered. Disposition  Final diagnoses:   Upper respiratory tract infection, unspecified type     Time reflects when diagnosis was documented in both MDM as applicable and the Disposition within this note       Time User Action Codes Description Comment    11/16/2023  8:55 PM Omkar Duncan Add [J06.9] Upper respiratory tract infection, unspecified type           ED Disposition       ED Disposition   Discharge    Condition   Stable    Date/Time   Thu Nov 16, 2023  8:55 PM    Comment   Erick Boss discharge to home/self care. Follow-up Information       Follow up With Specialties Details Why [address] [facility], [facility], Nurse Practitioner   [address] [facility],First Floor  [address] [facility] [id number] [phone]              Patient's Medications   Discharge Prescriptions    No medications on file       No discharge procedures on file.     PDMP Review       None            ED Provider  Electronically Signed by             Kaylynn Asencio MD  11/16/23 2477

## 2023-11-18 ENCOUNTER — APPOINTMENT (EMERGENCY)
Dept: CT IMAGING | Facility: HOSPITAL | Age: 73
DRG: 871 | End: 2023-11-18
Payer: COMMERCIAL

## 2023-11-18 ENCOUNTER — HOSPITAL ENCOUNTER (INPATIENT)
Facility: HOSPITAL | Age: 73
LOS: 3 days | Discharge: HOME | DRG: 871 | End: 2023-11-22
Attending: EMERGENCY MEDICINE | Admitting: INTERNAL MEDICINE
Payer: COMMERCIAL

## 2023-11-18 DIAGNOSIS — N39.0 UTI (URINARY TRACT INFECTION): ICD-10-CM

## 2023-11-18 DIAGNOSIS — I26.93 SINGLE SUBSEGMENTAL PULMONARY EMBOLISM WITHOUT ACUTE COR PULMONALE (HCC): Primary | ICD-10-CM

## 2023-11-18 DIAGNOSIS — J06.9 VIRAL URI WITH COUGH: ICD-10-CM

## 2023-11-18 DIAGNOSIS — U07.1 COVID: ICD-10-CM

## 2023-11-18 DIAGNOSIS — R10.9 FLANK PAIN: ICD-10-CM

## 2023-11-18 DIAGNOSIS — I26.99 OTHER ACUTE PULMONARY EMBOLISM, UNSPECIFIED WHETHER ACUTE COR PULMONALE PRESENT (HCC): ICD-10-CM

## 2023-11-18 LAB
ALBUMIN SERPL BCP-MCNC: 4.1 G/DL (ref 3.5–5)
ALP SERPL-CCNC: 87 U/L (ref 34–104)
ALT SERPL W P-5'-P-CCNC: 15 U/L (ref 7–52)
ANION GAP SERPL CALCULATED.3IONS-SCNC: 9 MMOL/L
APTT PPP: 36 SECONDS (ref 23–37)
AST SERPL W P-5'-P-CCNC: 20 U/L (ref 13–39)
ATRIAL RATE: 77 BPM
BACTERIA UR QL AUTO: ABNORMAL /HPF
BASOPHILS # BLD AUTO: 0.02 THOUSANDS/ÂΜL (ref 0–0.1)
BASOPHILS NFR BLD AUTO: 0 % (ref 0–1)
BILIRUB SERPL-MCNC: 0.88 MG/DL (ref 0.2–1)
BILIRUB UR QL STRIP: NEGATIVE
BUDDING YEAST: PRESENT
BUN SERPL-MCNC: 15 MG/DL (ref 5–25)
CALCIUM SERPL-MCNC: 11.2 MG/DL (ref 8.4–10.2)
CARDIAC TROPONIN I PNL SERPL HS: 4 NG/L
CHLORIDE SERPL-SCNC: 100 MMOL/L (ref 96–108)
CLARITY UR: ABNORMAL
CO2 SERPL-SCNC: 23 MMOL/L (ref 21–32)
COLOR UR: ABNORMAL
CREAT SERPL-MCNC: 1 MG/DL (ref 0.6–1.3)
D DIMER PPP FEU-MCNC: 2.5 UG/ML FEU
EOSINOPHIL # BLD AUTO: 0.07 THOUSAND/ÂΜL (ref 0–0.61)
EOSINOPHIL NFR BLD AUTO: 1 % (ref 0–6)
ERYTHROCYTE [DISTWIDTH] IN BLOOD BY AUTOMATED COUNT: 14.2 % (ref 11.6–15.1)
GFR SERPL CREATININE-BSD FRML MDRD: 56 ML/MIN/1.73SQ M
GLUCOSE SERPL-MCNC: 108 MG/DL (ref 65–140)
GLUCOSE UR STRIP-MCNC: NEGATIVE MG/DL
HCT VFR BLD AUTO: 42.1 % (ref 34.8–46.1)
HGB BLD-MCNC: 14.1 G/DL (ref 11.5–15.4)
HGB UR QL STRIP.AUTO: ABNORMAL
HYALINE CASTS #/AREA URNS LPF: ABNORMAL /LPF
IMM GRANULOCYTES # BLD AUTO: 0.06 THOUSAND/UL (ref 0–0.2)
IMM GRANULOCYTES NFR BLD AUTO: 0 % (ref 0–2)
INR PPP: 1.05 (ref 0.84–1.19)
KETONES UR STRIP-MCNC: NEGATIVE MG/DL
LEUKOCYTE ESTERASE UR QL STRIP: ABNORMAL
LYMPHOCYTES # BLD AUTO: 1.1 THOUSANDS/ÂΜL (ref 0.6–4.47)
LYMPHOCYTES NFR BLD AUTO: 8 % (ref 14–44)
MCH RBC QN AUTO: 26.8 PG (ref 26.8–34.3)
MCHC RBC AUTO-ENTMCNC: 33.5 G/DL (ref 31.4–37.4)
MCV RBC AUTO: 80 FL (ref 82–98)
MONOCYTES # BLD AUTO: 1.1 THOUSAND/ÂΜL (ref 0.17–1.22)
MONOCYTES NFR BLD AUTO: 8 % (ref 4–12)
MUCOUS THREADS UR QL AUTO: ABNORMAL
NEUTROPHILS # BLD AUTO: 12.03 THOUSANDS/ÂΜL (ref 1.85–7.62)
NEUTS SEG NFR BLD AUTO: 83 % (ref 43–75)
NITRITE UR QL STRIP: POSITIVE
NON-SQ EPI CELLS URNS QL MICRO: ABNORMAL /HPF
NRBC BLD AUTO-RTO: 0 /100 WBCS
P AXIS: 50 DEGREES
PH UR STRIP.AUTO: 5.5 [PH]
PLATELET # BLD AUTO: 279 THOUSANDS/UL (ref 149–390)
PMV BLD AUTO: 8.5 FL (ref 8.9–12.7)
POTASSIUM SERPL-SCNC: 4.5 MMOL/L (ref 3.5–5.3)
PR INTERVAL: 124 MS
PROT SERPL-MCNC: 8.1 G/DL (ref 6.4–8.4)
PROT UR STRIP-MCNC: ABNORMAL MG/DL
PROTHROMBIN TIME: 13.9 SECONDS (ref 11.6–14.5)
QRS AXIS: -46 DEGREES
QRSD INTERVAL: 70 MS
QT INTERVAL: 356 MS
QTC INTERVAL: 402 MS
RBC # BLD AUTO: 5.26 MILLION/UL (ref 3.81–5.12)
RBC #/AREA URNS AUTO: ABNORMAL /HPF
SODIUM SERPL-SCNC: 132 MMOL/L (ref 135–147)
SP GR UR STRIP.AUTO: 1.01 (ref 1–1.03)
T WAVE AXIS: 61 DEGREES
UROBILINOGEN UR STRIP-ACNC: <2 MG/DL
VENTRICULAR RATE: 77 BPM
WBC # BLD AUTO: 14.38 THOUSAND/UL (ref 4.31–10.16)
WBC #/AREA URNS AUTO: ABNORMAL /HPF

## 2023-11-18 PROCEDURE — 81001 URINALYSIS AUTO W/SCOPE: CPT

## 2023-11-18 PROCEDURE — 87077 CULTURE AEROBIC IDENTIFY: CPT

## 2023-11-18 PROCEDURE — 85610 PROTHROMBIN TIME: CPT

## 2023-11-18 PROCEDURE — 85025 COMPLETE CBC W/AUTO DIFF WBC: CPT

## 2023-11-18 PROCEDURE — 36415 COLL VENOUS BLD VENIPUNCTURE: CPT

## 2023-11-18 PROCEDURE — 99285 EMERGENCY DEPT VISIT HI MDM: CPT

## 2023-11-18 PROCEDURE — 85730 THROMBOPLASTIN TIME PARTIAL: CPT

## 2023-11-18 PROCEDURE — 96374 THER/PROPH/DIAG INJ IV PUSH: CPT

## 2023-11-18 PROCEDURE — 87086 URINE CULTURE/COLONY COUNT: CPT

## 2023-11-18 PROCEDURE — 85379 FIBRIN DEGRADATION QUANT: CPT

## 2023-11-18 PROCEDURE — 99291 CRITICAL CARE FIRST HOUR: CPT

## 2023-11-18 PROCEDURE — 80053 COMPREHEN METABOLIC PANEL: CPT

## 2023-11-18 PROCEDURE — 87186 SC STD MICRODIL/AGAR DIL: CPT

## 2023-11-18 PROCEDURE — 84484 ASSAY OF TROPONIN QUANT: CPT

## 2023-11-18 PROCEDURE — 96375 TX/PRO/DX INJ NEW DRUG ADDON: CPT

## 2023-11-18 PROCEDURE — 93005 ELECTROCARDIOGRAM TRACING: CPT

## 2023-11-18 PROCEDURE — 71275 CT ANGIOGRAPHY CHEST: CPT

## 2023-11-18 PROCEDURE — G1004 CDSM NDSC: HCPCS

## 2023-11-18 PROCEDURE — 83880 ASSAY OF NATRIURETIC PEPTIDE: CPT | Performed by: INTERNAL MEDICINE

## 2023-11-18 RX ORDER — HEPARIN SODIUM 1000 [USP'U]/ML
2400 INJECTION, SOLUTION INTRAVENOUS; SUBCUTANEOUS EVERY 6 HOURS PRN
Status: DISCONTINUED | OUTPATIENT
Start: 2023-11-18 | End: 2023-11-22

## 2023-11-18 RX ORDER — HEPARIN SODIUM 10000 [USP'U]/100ML
3-30 INJECTION, SOLUTION INTRAVENOUS
Status: DISCONTINUED | OUTPATIENT
Start: 2023-11-18 | End: 2023-11-22

## 2023-11-18 RX ORDER — HEPARIN SODIUM 1000 [USP'U]/ML
4800 INJECTION, SOLUTION INTRAVENOUS; SUBCUTANEOUS ONCE
Status: COMPLETED | OUTPATIENT
Start: 2023-11-18 | End: 2023-11-18

## 2023-11-18 RX ORDER — CEFTRIAXONE 1 G/50ML
1000 INJECTION, SOLUTION INTRAVENOUS ONCE
Status: COMPLETED | OUTPATIENT
Start: 2023-11-18 | End: 2023-11-19

## 2023-11-18 RX ORDER — HEPARIN SODIUM 1000 [USP'U]/ML
4800 INJECTION, SOLUTION INTRAVENOUS; SUBCUTANEOUS EVERY 6 HOURS PRN
Status: DISCONTINUED | OUTPATIENT
Start: 2023-11-18 | End: 2023-11-22

## 2023-11-18 RX ORDER — LIDOCAINE 50 MG/G
1 PATCH TOPICAL ONCE
Status: COMPLETED | OUTPATIENT
Start: 2023-11-18 | End: 2023-11-19

## 2023-11-18 RX ADMIN — HEPARIN SODIUM 18 UNITS/KG/HR: 10000 INJECTION, SOLUTION INTRAVENOUS at 23:51

## 2023-11-18 RX ADMIN — CEFTRIAXONE 1000 MG: 1 INJECTION, SOLUTION INTRAVENOUS at 23:51

## 2023-11-18 RX ADMIN — IOHEXOL 80 ML: 350 INJECTION, SOLUTION INTRAVENOUS at 21:25

## 2023-11-18 RX ADMIN — LIDOCAINE 1 PATCH: 700 PATCH TOPICAL at 20:10

## 2023-11-18 RX ADMIN — HEPARIN SODIUM 4800 UNITS: 1000 INJECTION INTRAVENOUS; SUBCUTANEOUS at 23:50

## 2023-11-19 PROBLEM — U07.1 COVID: Status: ACTIVE | Noted: 2023-11-19

## 2023-11-19 PROBLEM — N30.01 ACUTE CYSTITIS WITH HEMATURIA: Status: ACTIVE | Noted: 2023-11-19

## 2023-11-19 PROBLEM — I26.99 PULMONARY EMBOLISM (HCC): Status: ACTIVE | Noted: 2023-11-19

## 2023-11-19 LAB
APTT PPP: 84 SECONDS (ref 23–37)
APTT PPP: 92 SECONDS (ref 23–37)
BASOPHILS # BLD AUTO: 0.02 THOUSANDS/ÂΜL (ref 0–0.1)
BASOPHILS NFR BLD AUTO: 0 % (ref 0–1)
BNP SERPL-MCNC: 10 PG/ML (ref 0–100)
EOSINOPHIL # BLD AUTO: 0.17 THOUSAND/ÂΜL (ref 0–0.61)
EOSINOPHIL NFR BLD AUTO: 2 % (ref 0–6)
ERYTHROCYTE [DISTWIDTH] IN BLOOD BY AUTOMATED COUNT: 14.1 % (ref 11.6–15.1)
HCT VFR BLD AUTO: 40.9 % (ref 34.8–46.1)
HGB BLD-MCNC: 13.8 G/DL (ref 11.5–15.4)
IMM GRANULOCYTES # BLD AUTO: 0.03 THOUSAND/UL (ref 0–0.2)
IMM GRANULOCYTES NFR BLD AUTO: 0 % (ref 0–2)
INR PPP: 1.17 (ref 0.84–1.19)
LYMPHOCYTES # BLD AUTO: 1.62 THOUSANDS/ÂΜL (ref 0.6–4.47)
LYMPHOCYTES NFR BLD AUTO: 15 % (ref 14–44)
MCH RBC QN AUTO: 27 PG (ref 26.8–34.3)
MCHC RBC AUTO-ENTMCNC: 33.7 G/DL (ref 31.4–37.4)
MCV RBC AUTO: 80 FL (ref 82–98)
MONOCYTES # BLD AUTO: 0.95 THOUSAND/ÂΜL (ref 0.17–1.22)
MONOCYTES NFR BLD AUTO: 9 % (ref 4–12)
NEUTROPHILS # BLD AUTO: 8.24 THOUSANDS/ÂΜL (ref 1.85–7.62)
NEUTS SEG NFR BLD AUTO: 74 % (ref 43–75)
NRBC BLD AUTO-RTO: 0 /100 WBCS
PLATELET # BLD AUTO: 266 THOUSANDS/UL (ref 149–390)
PMV BLD AUTO: 8.3 FL (ref 8.9–12.7)
PROTHROMBIN TIME: 15.1 SECONDS (ref 11.6–14.5)
RBC # BLD AUTO: 5.12 MILLION/UL (ref 3.81–5.12)
WBC # BLD AUTO: 11.03 THOUSAND/UL (ref 4.31–10.16)

## 2023-11-19 PROCEDURE — 85730 THROMBOPLASTIN TIME PARTIAL: CPT | Performed by: INTERNAL MEDICINE

## 2023-11-19 PROCEDURE — 85610 PROTHROMBIN TIME: CPT | Performed by: INTERNAL MEDICINE

## 2023-11-19 PROCEDURE — 85730 THROMBOPLASTIN TIME PARTIAL: CPT | Performed by: HOSPITALIST

## 2023-11-19 PROCEDURE — 85025 COMPLETE CBC W/AUTO DIFF WBC: CPT | Performed by: INTERNAL MEDICINE

## 2023-11-19 PROCEDURE — 99223 1ST HOSP IP/OBS HIGH 75: CPT | Performed by: INTERNAL MEDICINE

## 2023-11-19 RX ORDER — ACETAMINOPHEN 325 MG/1
650 TABLET ORAL EVERY 6 HOURS PRN
Status: DISCONTINUED | OUTPATIENT
Start: 2023-11-19 | End: 2023-11-22 | Stop reason: HOSPADM

## 2023-11-19 RX ORDER — PANTOPRAZOLE SODIUM 40 MG/1
40 TABLET, DELAYED RELEASE ORAL
Status: DISCONTINUED | OUTPATIENT
Start: 2023-11-19 | End: 2023-11-22 | Stop reason: HOSPADM

## 2023-11-19 RX ORDER — GUAIFENESIN 600 MG/1
600 TABLET, EXTENDED RELEASE ORAL EVERY 12 HOURS SCHEDULED
Status: DISCONTINUED | OUTPATIENT
Start: 2023-11-19 | End: 2023-11-22 | Stop reason: HOSPADM

## 2023-11-19 RX ORDER — TERAZOSIN 1 MG/1
1 CAPSULE ORAL
Status: DISCONTINUED | OUTPATIENT
Start: 2023-11-19 | End: 2023-11-22 | Stop reason: HOSPADM

## 2023-11-19 RX ORDER — PRAVASTATIN SODIUM 40 MG
40 TABLET ORAL
Status: DISCONTINUED | OUTPATIENT
Start: 2023-11-19 | End: 2023-11-22 | Stop reason: HOSPADM

## 2023-11-19 RX ORDER — MIRTAZAPINE 15 MG/1
7.5 TABLET, FILM COATED ORAL
Status: DISCONTINUED | OUTPATIENT
Start: 2023-11-19 | End: 2023-11-22 | Stop reason: HOSPADM

## 2023-11-19 RX ORDER — CEFTRIAXONE 1 G/50ML
1000 INJECTION, SOLUTION INTRAVENOUS EVERY 24 HOURS
Status: DISCONTINUED | OUTPATIENT
Start: 2023-11-19 | End: 2023-11-19

## 2023-11-19 RX ORDER — BENZONATATE 100 MG/1
100 CAPSULE ORAL EVERY 8 HOURS SCHEDULED
Status: DISCONTINUED | OUTPATIENT
Start: 2023-11-19 | End: 2023-11-22 | Stop reason: HOSPADM

## 2023-11-19 RX ORDER — GABAPENTIN 100 MG/1
100 CAPSULE ORAL
Status: DISCONTINUED | OUTPATIENT
Start: 2023-11-19 | End: 2023-11-22 | Stop reason: HOSPADM

## 2023-11-19 RX ADMIN — PANTOPRAZOLE SODIUM 40 MG: 40 TABLET, DELAYED RELEASE ORAL at 05:40

## 2023-11-19 RX ADMIN — TERAZOSIN HYDROCHLORIDE 1 MG: 1 CAPSULE ORAL at 02:22

## 2023-11-19 RX ADMIN — GABAPENTIN 100 MG: 100 CAPSULE ORAL at 02:09

## 2023-11-19 RX ADMIN — BENZONATATE 100 MG: 100 CAPSULE ORAL at 02:08

## 2023-11-19 RX ADMIN — MIRTAZAPINE 7.5 MG: 15 TABLET, FILM COATED ORAL at 22:04

## 2023-11-19 RX ADMIN — AZTREONAM 1000 MG: 1 INJECTION, POWDER, LYOPHILIZED, FOR SOLUTION INTRAMUSCULAR; INTRAVENOUS at 14:25

## 2023-11-19 RX ADMIN — GUAIFENESIN 600 MG: 600 TABLET, EXTENDED RELEASE ORAL at 22:04

## 2023-11-19 RX ADMIN — BENZONATATE 100 MG: 100 CAPSULE ORAL at 05:40

## 2023-11-19 RX ADMIN — MIRTAZAPINE 7.5 MG: 15 TABLET, FILM COATED ORAL at 02:08

## 2023-11-19 RX ADMIN — BENZONATATE 100 MG: 100 CAPSULE ORAL at 22:04

## 2023-11-19 RX ADMIN — PRAVASTATIN SODIUM 40 MG: 40 TABLET ORAL at 16:40

## 2023-11-19 RX ADMIN — AZTREONAM 1000 MG: 1 INJECTION, POWDER, LYOPHILIZED, FOR SOLUTION INTRAMUSCULAR; INTRAVENOUS at 05:39

## 2023-11-19 RX ADMIN — GUAIFENESIN 600 MG: 600 TABLET, EXTENDED RELEASE ORAL at 02:08

## 2023-11-19 RX ADMIN — AZTREONAM 1000 MG: 1 INJECTION, POWDER, LYOPHILIZED, FOR SOLUTION INTRAMUSCULAR; INTRAVENOUS at 22:04

## 2023-11-19 RX ADMIN — GABAPENTIN 100 MG: 100 CAPSULE ORAL at 22:04

## 2023-11-19 RX ADMIN — TERAZOSIN HYDROCHLORIDE 1 MG: 1 CAPSULE ORAL at 22:04

## 2023-11-19 RX ADMIN — GUAIFENESIN 600 MG: 600 TABLET, EXTENDED RELEASE ORAL at 09:46

## 2023-11-19 RX ADMIN — BENZONATATE 100 MG: 100 CAPSULE ORAL at 14:25

## 2023-11-19 RX ADMIN — ACETAMINOPHEN 325MG 650 MG: 325 TABLET ORAL at 02:08

## 2023-11-19 NOTE — ASSESSMENT & PLAN NOTE
Right upper back pain as well as pleuritic chest pain over the last several days and right calf pain  COVID-positive back on November 7  CTA PE protocol with subsegmental pulmonary embolus at the medial right lobe as well as stable hilar and bilateral mediastinal adenopathy  On room air with normal O2 sats  BNP ordered and pending  In addition to being COVID-positive, patient uses estradiol vaginal cream for recurrent UTIs; denies any recent long car trips or surgeries  Admit to medicine on telemetry for acute PE. Start heparin drip. Order 2D echo. Order bilateral venous duplex to assess for DVT. Continuous pulse ox. Current thought process that patient's acute pulmonary emboli provoked in the setting of COVID. Patient additionally uses estradiol cream for recurrent UTI.

## 2023-11-19 NOTE — ED PROVIDER NOTES
History  Chief Complaint   Patient presents with    Back Pain     Right sided back pain that radiates to the front of pt torso. Pt states pain is worse when she takes a deep breathe. The patient is a 41-year-old female with history of demyelination, stroke, GERD who presents to the ED for evaluation of right flank pain that radiates to her right upper abdomen. She reports it has been present for the past 4 days. She reports taking deep breaths it is difficult secondary to the pain. Of note, the patient had COVID in 11/7. She does report ongoing chills and cough following this. She also notes prior to symptom onset, she believes she may have lifted something heavy when at the supermarket. She also notes that 1 month ago, she did experience foul-smelling urine, however this resolved. She does have frequent UTIs. She did experienced right calf pain last which, however after she stopped taking Prednisone this resolved. She otherwise denies fever, leg swelling, recent travel, recent surgery, hemoptysis, history of DVT/PE, abdominal pain, nausea, vomiting, diarrhea, melena, hematochezia, dysuria, hematuria, low back pain. Prior to Admission Medications   Prescriptions Last Dose Informant Patient Reported? Taking?    Bone Meal-Vitamin D (BONE MEAL PO)  Self Yes No   Sig: Take by mouth   Collagen Hydrolysate POWD  Self Yes No   Sig: Use     D-Mannose POWD  Self No No   Sig: Take 1 Dose by mouth in the morning   Multiple Vitamin (multivitamin) tablet  Self Yes No   Sig: Take 1 tablet by mouth daily   b complex vitamins tablet  Self Yes No   Sig: Take 1 tablet by mouth daily     benzonatate (TESSALON PERLES) 100 mg capsule   No No   Sig: Take 1 capsule (100 mg total) by mouth every 8 (eight) hours   calcium carbonate-vitamin D (OSCAL-D) 500 mg-200 units per tablet  Self Yes No   Sig: Take 1 tablet by mouth daily with breakfast   estradiol (ESTRACE) 0.1 mg/g vaginal cream  Self No No   Sig: Apply a pea-sized amount to your finger and placed around the urethral opening and around the vaginal opening twice a week on Wednesday and Sunday   gabapentin (NEURONTIN) 100 mg capsule  Self No No   Sig: TAKE ONE CAPSULE BY MOUTH EVERY DAY AT BEDTIME   mirtazapine (REMERON) 7.5 MG tablet   No No   Sig: Take 1 tablet (7.5 mg total) by mouth daily at bedtime   omeprazole (PriLOSEC) 20 mg delayed release capsule   No No   Sig: Take 1 capsule (20 mg total) by mouth daily   rosuvastatin (CRESTOR) 5 mg tablet  Self No No   Sig: TAKE ONE TABLET BY MOUTH DAILY   terazosin (HYTRIN) 1 mg capsule   No No   Sig: Take 1 capsule (1 mg total) by mouth daily at bedtime      Facility-Administered Medications: None       Past Medical History:   Diagnosis Date    Age-related osteoporosis without current pathological fracture 7/25/2018    Class 1 obesity in adult 3/26/2018    Gastroesophageal reflux disease without esophagitis 3/26/2018    Hernia, hiatal     Insomnia     Osteoporosis     Recurrent UTI 5/3/2018    Stroke (HCC)     Ulcer, gastric, acute     Vitamin D deficiency        Past Surgical History:   Procedure Laterality Date    BREAST BIOPSY Left     1997    HERNIA REPAIR      umbilical hernia     TUBAL LIGATION         Family History   Problem Relation Age of Onset    Diabetes Mother     Heart disease Father     COPD Father     Prostate cancer Brother         age unknown    No Known Problems Sister     No Known Problems Daughter     No Known Problems Maternal Grandmother     No Known Problems Maternal Grandfather     No Known Problems Paternal Grandmother     No Known Problems Paternal Grandfather     No Known Problems Sister     No Known Problems Sister     No Known Problems Sister     No Known Problems Sister     No Known Problems Sister      I have reviewed and agree with the history as documented.     E-Cigarette/Vaping    E-Cigarette Use Never User      E-Cigarette/Vaping Substances     Social History     Tobacco Use    Smoking status: Never    Smokeless tobacco: Never    Tobacco comments:     exposed to heavy smoke and chemicals   Vaping Use    Vaping Use: Never used   Substance Use Topics    Alcohol use: No    Drug use: No       Review of Systems   Constitutional:  Positive for chills. Negative for fever. HENT:  Negative for congestion and rhinorrhea. Respiratory:  Positive for cough and shortness of breath. Cardiovascular:  Positive for chest pain. Negative for leg swelling. Gastrointestinal:  Negative for abdominal pain, constipation, diarrhea, nausea and vomiting. Genitourinary:  Negative for dysuria and flank pain. Musculoskeletal:  Positive for back pain (R upper). Negative for arthralgias and myalgias. Skin:  Negative for rash and wound. Neurological:  Negative for dizziness, weakness, numbness and headaches. Psychiatric/Behavioral:  Negative for behavioral problems. Physical Exam  Physical Exam  Vitals and nursing note reviewed. Constitutional:       General: She is not in acute distress. Appearance: She is well-developed. She is not toxic-appearing. HENT:      Head: Normocephalic and atraumatic. Eyes:      Conjunctiva/sclera: Conjunctivae normal.   Cardiovascular:      Rate and Rhythm: Normal rate and regular rhythm. Heart sounds: No murmur heard. Pulmonary:      Effort: Pulmonary effort is normal. No respiratory distress. Breath sounds: Normal breath sounds. No stridor. No wheezing, rhonchi or rales. Abdominal:      Palpations: Abdomen is soft. Tenderness: There is no abdominal tenderness. There is right CVA tenderness. There is no left CVA tenderness, guarding or rebound. Musculoskeletal:         General: No swelling or tenderness (no calf tenderness). Cervical back: Neck supple. Thoracic back: No bony tenderness. Back:       Right lower leg: No edema. Left lower leg: No edema. Skin:     General: Skin is warm and dry.       Capillary Refill: Capillary refill takes less than 2 seconds. Neurological:      Mental Status: She is alert.    Psychiatric:         Mood and Affect: Mood normal.         Vital Signs  ED Triage Vitals [11/18/23 1829]   Temperature Pulse Respirations Blood Pressure SpO2   99.2 °F (37.3 °C) 103 16 129/77 95 %      Temp Source Heart Rate Source Patient Position - Orthostatic VS BP Location FiO2 (%)   Oral Monitor Sitting Right arm --      Pain Score       --           Vitals:    11/18/23 1829 11/18/23 2152 11/19/23 0000 11/19/23 0030   BP: 129/77 141/84 151/73 136/92   Pulse: 103 83 98 92   Patient Position - Orthostatic VS: Sitting Sitting Sitting Lying         Visual Acuity      ED Medications  Medications   lidocaine (LIDODERM) 5 % patch 1 patch (1 patch Topical Medication Applied 11/18/23 2010)   heparin (porcine) 25,000 units in 0.45% NaCl 250 mL infusion (premix) (18 Units/kg/hr × 60 kg (Order-Specific) Intravenous New Bag 11/18/23 7985)   heparin (porcine) injection 4,800 Units (has no administration in time range)   heparin (porcine) injection 2,400 Units (has no administration in time range)   benzonatate (TESSALON PERLES) capsule 100 mg (has no administration in time range)   gabapentin (NEURONTIN) capsule 100 mg (has no administration in time range)   mirtazapine (REMERON) tablet 7.5 mg (has no administration in time range)   pantoprazole (PROTONIX) EC tablet 40 mg (has no administration in time range)   pravastatin (PRAVACHOL) tablet 40 mg (has no administration in time range)   terazosin (HYTRIN) capsule 1 mg (has no administration in time range)   acetaminophen (TYLENOL) tablet 650 mg (has no administration in time range)   cefTRIAXone (ROCEPHIN) IVPB (premix in dextrose) 1,000 mg 50 mL (has no administration in time range)   guaiFENesin (MUCINEX) 12 hr tablet 600 mg (has no administration in time range)   iohexol (OMNIPAQUE) 350 MG/ML injection (MULTI-DOSE) 80 mL (80 mL Intravenous Given 11/18/23 2125)   cefTRIAXone (ROCEPHIN) IVPB (premix in dextrose) 1,000 mg 50 mL (1,000 mg Intravenous New Bag 11/18/23 2351)   heparin (porcine) injection 4,800 Units (4,800 Units Intravenous Given 11/18/23 2350)       Diagnostic Studies  Results Reviewed       Procedure Component Value Units Date/Time    APTT [[id number]]     Lab Status: No result Specimen: Blood     Urine Microscopic [[id number]]  (Abnormal) Collected: 11/18/23 2154    Lab Status: Final result Specimen: Urine, Clean Catch Updated: 11/18/23 2237     RBC, UA 4-10 /hpf      WBC, UA Innumerable /hpf      Epithelial Cells Occasional /hpf      Bacteria, UA Innumerable /hpf      MUCUS THREADS Occasional     Hyaline Casts, UA 5-10 /lpf      Budding Yeast Present    Urine culture [[id number]] Collected: 11/18/23 2154    Lab Status:  In process Specimen: Urine, Clean Catch Updated: 11/18/23 2237    UA w Reflex to Microscopic w Reflex to Culture [[id number]]  (Abnormal) Collected: 11/18/23 2154    Lab Status: Final result Specimen: Urine, Clean Catch Updated: 11/18/23 2212     Color, UA Dark Yellow     Clarity, UA Turbid     Specific Gravity, UA 1.006     pH, UA 5.5     Leukocytes, UA Large     Nitrite, UA Positive     Protein, UA 30 (1+) mg/dl      Glucose, UA Negative mg/dl      Ketones, UA Negative mg/dl      Urobilinogen, UA <2.0 mg/dl      Bilirubin, UA Negative     Occult Blood, UA Trace    Comprehensive metabolic panel [[id number]]  (Abnormal) Collected: 11/18/23 2011    Lab Status: Final result Specimen: Blood from Arm, Left Updated: 11/18/23 2051     Sodium 132 mmol/L      Potassium 4.5 mmol/L      Chloride 100 mmol/L      CO2 23 mmol/L      ANION GAP 9 mmol/L      BUN 15 mg/dL      Creatinine 1.00 mg/dL      Glucose 108 mg/dL      Calcium 11.2 mg/dL      AST 20 U/L      ALT 15 U/L      Alkaline Phosphatase 87 U/L      Total Protein 8.1 g/dL      Albumin 4.1 g/dL      Total Bilirubin 0.88 mg/dL      eGFR 56 ml/min/1.73sq m     Narrative:      [facility] guidelines for Chronic Kidney Disease (CKD):     Stage 1 with normal or high GFR (GFR > 90 mL/min/1.73 square meters)    Stage 2 Mild CKD (GFR = 60-89 mL/min/1.73 square meters)    Stage 3A Moderate CKD (GFR = 45-59 mL/min/1.73 square meters)    Stage 3B Moderate CKD (GFR = 30-44 mL/min/1.73 square meters)    Stage 4 Severe CKD (GFR = 15-29 mL/min/1.73 square meters)    Stage 5 End Stage CKD (GFR <15 mL/min/1.73 square meters)  Note: GFR calculation is accurate only with a steady state creatinine    HS Troponin 0hr (reflex protocol) [[id number]]  (Normal) Collected: 11/18/23 2011    Lab Status: Final result Specimen: Blood from Arm, Left Updated: 11/18/23 2049     hs TnI 0hr 4 ng/L     D-Dimer [[id number]]  (Abnormal) Collected: 11/18/23 2011    Lab Status: Final result Specimen: Blood from Arm, Left Updated: 11/18/23 2039     D-Dimer, Quant 2.50 ug/ml FEU     Narrative: In the evaluation for possible pulmonary embolism, in the appropriate (Well's Score of 4 or less) patient, the age adjusted d-dimer cutoff for this patient can be calculated as:    Age x 0.01 (in ug/mL) for Age-adjusted D-dimer exclusion threshold for a patient over 50 years.     Protime-INR [[id number]]  (Normal) Collected: 11/18/23 2011    Lab Status: Final result Specimen: Blood from Arm, Left Updated: 11/18/23 2033     Protime 13.9 seconds      INR 1.05    APTT [[id number]]  (Normal) Collected: 11/18/23 2011    Lab Status: Final result Specimen: Blood from Arm, Left Updated: 11/18/23 2033     PTT 36 seconds     CBC and differential [[id number]]  (Abnormal) Collected: 11/18/23 2011    Lab Status: Final result Specimen: Blood from Arm, Left Updated: 11/18/23 2019     WBC 14.38 Thousand/uL      RBC 5.26 Million/uL      Hemoglobin 14.1 g/dL      Hematocrit 42.1 %      MCV 80 fL      MCH 26.8 pg      MCHC 33.5 g/dL      RDW 14.2 %      MPV 8.5 fL      Platelets 683 Thousands/uL      nRBC 0 /100 WBCs      Neutrophils Relative 83 %      Immat GRANS % 0 %      Lymphocytes Relative 8 %      Monocytes Relative 8 %      Eosinophils Relative 1 %      Basophils Relative 0 %      Neutrophils Absolute 12.03 Thousands/µL      Immature Grans Absolute 0.06 Thousand/uL      Lymphocytes Absolute 1.10 Thousands/µL      Monocytes Absolute 1.10 Thousand/µL      Eosinophils Absolute 0.07 Thousand/µL      Basophils Absolute 0.02 Thousands/µL                    CTA ED chest PE Study   Final Result by Suzanna Benjamin MD (11/18 2305)      Subsegmental pulmonary embolus at the medial right lower lobe      Stable hilar and bilateral mediastinal adenopathy. Findings discussed with Dr. Nneka Jenkins at 11:02 p.m., 11/18/2023      Workstation performed: [id number]         VAS lower limb venous duplex study, complete bilateral    (Results Pending)              Procedures  CriticalCare Time    Date/Time: 11/18/2023 11:10 PM    Performed by: Cami Moses PA-C  Authorized by: Cami Moses PA-C    Critical care provider statement:     Critical care time (minutes):  35    Critical care time was exclusive of:  Separately billable procedures and treating other patients    Critical care was time spent personally by me on the following activities:  Examination of patient, ordering and review of laboratory studies, ordering and review of radiographic studies, ordering and performing treatments and interventions, re-evaluation of patient's condition and evaluation of patient's response to treatment           ED Course  ED Course as of 11/19/23 0050   Sat Nov 18, 2023 2024 WBC(!): 14.38   2044 D-Dimer, Quant(!): 2.50  Will order CTA pending CMP    2303 Per radiology RLL subsegmental PE with no heart strain    2303 PROTIME: 13.9   2304 POCT INR: 1.05   2304 PTT: 36   2310 CTA ED chest PE Study     IMPRESSION:     Subsegmental pulmonary embolus at the medial right lower lobe     Stable hilar and bilateral mediastinal adenopathy.         2311 Nitrite, UA(!): Positive   2312 Leukocytes, UA(!): Large   2312 Bacteria, UA(!): Innumerable   2312 WBC, UA(!): Innumerable   2330 Heparin and Rocephin ordered       SBIRT 20yo+      Flowsheet Row Most Recent Value   Initial Alcohol Screen: US AUDIT-C     1. How often do you have a drink containing alcohol? 0 Filed at: 11/18/2023 1934   2. How many drinks containing alcohol do you have on a typical day you are drinking? 0 Filed at: 11/18/2023 1934   3a. Male UNDER 65: How often do you have five or more drinks on one occasion? 0 Filed at: 11/18/2023 1934   3b. FEMALE Any Age, or MALE 65+: How often do you have 4 or more drinks on one occassion? 0 Filed at: 11/18/2023 1934   Audit-C Score 0 Filed at: 11/18/2023 1934   RAY: How many times in the past year have you. .. Used an illegal drug or used a prescription medication for non-medical reasons? Never Filed at: 11/18/2023 1934          EKG: NSR at 77 BPM, , QTc 402, LAD,  no ST elevation or depression as interpreted by me     Medical Decision Making  DDx including but not limited to: PNA, PE, renal colic, pyelonephritis, UTI, GI etiology,  biliary colic, musculoskeletal pain. Will obtain EKG, troponin in setting of SOB. Will obtain D Dimer given recent COVID, tachycardia, spO2 < 96, and R calf pain. Will obtain CBC to evaluate for leukocytosis, anemia. Will obtain CMP to evaluate kidney function, for electrolyte disturbance. Will obtain UA. PE noted on CT. Heparin ordered. UA with innumerable WBC, bacteria. Rocephin ordered. Pt with leukocytosis, tachycardia. SIRS met however unclear if 2/2 PE versus UTI. Labs otherwise wo actionable derangement. Findings discussed with pt and family. Will admit    At the time of admission, the patient is in no acute distress. I discussed with the patient and family the diagnosis, treatment plan, and plan for admission; they were given the opportunity to ask questions and verbalized understanding. They agree with plan.     Problems Addressed:  Flank pain: acute illness or injury  Single subsegmental pulmonary embolism without acute cor pulmonale ([address]): acute illness or injury  UTI (urinary tract infection): acute illness or injury    Amount and/or Complexity of Data Reviewed  External Data Reviewed: labs, radiology, ECG and notes. Labs: ordered. Decision-making details documented in ED Course. Radiology: ordered. Decision-making details documented in ED Course. Risk  Prescription drug management. Decision regarding hospitalization. Disposition  Final diagnoses:   Single subsegmental pulmonary embolism without acute cor pulmonale (HCC)   UTI (urinary tract infection)   Flank pain     Time reflects when diagnosis was documented in both MDM as applicable and the Disposition within this note       Time User Action Codes Description Comment    11/18/2023 11:37 PM Duc Roach Add [I26.93] Single subsegmental pulmonary embolism without acute cor pulmonale ([address])     11/18/2023 11:37 PM [facility] Add [N39.0] UTI (urinary tract infection)     11/18/2023 11:39 PM [facility] Add [R10.9] Flank pain           ED Disposition       ED Disposition   Admit    Condition   Stable    Date/Time   Sun Nov 19, 2023 0002    Comment   Case was discussed with RANDA and the patient's admission status was agreed to be Admission Status: inpatient status to the service of Dr. Stuart Rosenberg . Follow-up Information    None         Patient's Medications   Discharge Prescriptions    No medications on file       No discharge procedures on file.     PDMP Review       None            ED Provider  Electronically Signed by             Francisco Forrest PA-C  11/19/23 0100

## 2023-11-19 NOTE — H&P
[address] [facility]  H&P  Name: Nathanael Hernandes 68 y.o. female I MRN: [id number]  Unit/Bed#: E5 -01 I Date of Admission: 11/18/2023   Date of Service: 11/19/2023 I Hospital Day: 0      Assessment/Plan   * Pulmonary embolism [facility])  Assessment & Plan  Right upper back pain as well as pleuritic chest pain over the last several days and right calf pain  COVID-positive back on November 7  CTA PE protocol with subsegmental pulmonary embolus at the medial right lobe as well as stable hilar and bilateral mediastinal adenopathy  On room air with normal O2 sats  BNP ordered and pending  In addition to being COVID-positive, patient uses estradiol vaginal cream for recurrent UTIs; denies any recent long car trips or surgeries  Admit to medicine on telemetry for acute PE. Start heparin drip. Order 2D echo. Order bilateral venous duplex to assess for DVT. Continuous pulse ox. Current thought process that patient's acute pulmonary emboli provoked in the setting of COVID. Patient additionally uses estradiol cream for recurrent UTI. COVID  Assessment & Plan  COVID positive back on November 7; on room air; no infiltrate noted on CT imaging  We will hold off of using dexamethasone or remdesivir for now and symptomatic treatment    Acute cystitis with hematuria  Assessment & Plan  UA nitrite positive, pyuria  History of multidrug-resistant UTI of Klebsiella as well as Morganella which were both sensitive to aztreonam  Start aztreonam and follow-up urine culture               VTE Prophylaxis: Heparin Drip  / sequential compression device and foot pump applied   Code Status: Level 1 - Full Code       Anticipated Length of Stay:  Patient will be admitted on an Inpatient basis with an anticipated length of stay of  > 2 midnights. Justification for Hospital Stay: Please see detailed plans noted above. Chief Complaint:     Acute pulmonary embolus, UTI  History of Present Illness:  Nathanael Hernandes is a 68 y.o. female who has past medical history significant for recurrent UTI, incomplete bladder emptying who presented to [facility] on the evening of 11/18 with symptoms of right-sided upper back pain as well as pleuritic chest pain that has been worsening over the last several days. Patient further reports right calf pain for about a week. Patient of note was diagnosed with COVID on 11/7 after she had symptoms of a headache, rhinorrhea as well as sore throat and chills. She reports that several days later she began to have right calf pain. She further reports that the symptoms related to pleuritic chest pain and right upper back pain started in the last couple days. She does report that her URI symptoms seem to have improved but continues to have the pleuritic chest pain. Patient denies any recent long car trips or surgeries.      Review of Systems:    Constitutional:  Denies fever or chills   Eyes:  Denies change in visual acuity   HENT:  Denies nasal congestion or sore throat   Respiratory: Positive for pleuritic chest pain  Cardiovascular: Positive for pleuritic chest pain  GI:  Denies abdominal pain or bloody stools  :  Denies dysuria   Musculoskeletal:  Denies back pain or joint pain   Integument:  Denies rash   Neurologic:  Denies headache or sensory changes   Endocrine:  Denies polyuria or polydipsia   Lymphatic:  Denies swollen glands   Psychiatric:  Denies depression or anxiety     Past Medical and Surgical History:   Past Medical History:   Diagnosis Date    Age-related osteoporosis without current pathological fracture 7/25/2018    Class 1 obesity in adult 3/26/2018    Gastroesophageal reflux disease without esophagitis 3/26/2018    Hernia, hiatal     Insomnia     Osteoporosis     Recurrent UTI 5/3/2018    Stroke ([address])     Ulcer, gastric, acute     Vitamin D deficiency      Past Surgical History:   Procedure Laterality Date    BREAST BIOPSY Left     1997    HERNIA REPAIR      umbilical hernia TUBAL LIGATION         Meds/Allergies:  Medications Prior to Admission   Medication Sig Dispense Refill Last Dose    b complex vitamins tablet Take 1 tablet by mouth daily         benzonatate (TESSALON PERLES) 100 mg capsule Take 1 capsule (100 mg total) by mouth every 8 (eight) hours 21 capsule 0     Bone Meal-Vitamin D (BONE MEAL PO) Take by mouth       calcium carbonate-vitamin D (OSCAL-D) 500 mg-200 units per tablet Take 1 tablet by mouth daily with breakfast       Collagen Hydrolysate POWD Use         D-Mannose POWD Take 1 Dose by mouth in the morning 100 g 1     estradiol (ESTRACE) 0.1 mg/g vaginal cream Apply a pea-sized amount to your finger and placed around the urethral opening and around the vaginal opening twice a week on Wednesday and Sunday 42.5 g 10     gabapentin (NEURONTIN) 100 mg capsule TAKE ONE CAPSULE BY MOUTH EVERY DAY AT BEDTIME 90 capsule 0     mirtazapine (REMERON) 7.5 MG tablet Take 1 tablet (7.5 mg total) by mouth daily at bedtime 30 tablet 6     Multiple Vitamin (multivitamin) tablet Take 1 tablet by mouth daily       omeprazole (PriLOSEC) 20 mg delayed release capsule Take 1 capsule (20 mg total) by mouth daily 90 capsule 1     rosuvastatin (CRESTOR) 5 mg tablet TAKE ONE TABLET BY MOUTH DAILY 90 tablet 1     terazosin (HYTRIN) 1 mg capsule Take 1 capsule (1 mg total) by mouth daily at bedtime 90 capsule 3        Allergies: Allergies   Allergen Reactions    Ciprofloxacin Shortness Of Breath    Sulfa Antibiotics Shortness Of Breath     Stress and rash.     Nitrofurantoin Nausea Only    Other Nausea Only     macrobid       History:  Marital Status: /Civil Union     Substance Use History:   Social History     Substance and Sexual Activity   Alcohol Use No     Social History     Tobacco Use   Smoking Status Never   Smokeless Tobacco Never   Tobacco Comments    exposed to heavy smoke and chemicals     Social History     Substance and Sexual Activity   Drug Use No       Family History:  Family History   Problem Relation Age of Onset    Diabetes Mother     Heart disease Father     COPD Father     Prostate cancer Brother         age unknown    No Known Problems Sister     No Known Problems Daughter     No Known Problems Maternal Grandmother     No Known Problems Maternal Grandfather     No Known Problems Paternal Grandmother     No Known Problems Paternal Grandfather     No Known Problems Sister     No Known Problems Sister     No Known Problems Sister     No Known Problems Sister     No Known Problems Sister        Physical Exam:     Vitals:   Blood Pressure: 143/85 (11/19/23 0106)  Pulse: 92 (11/19/23 0106)  Temperature: 99.3 °F (37.4 °C) (11/19/23 0106)  Temp Source: Oral (11/18/23 1829)  Respirations: 16 (11/19/23 0106)  Weight - Scale: 63.1 kg (139 lb 1.8 oz) (11/18/23 1829)  SpO2: 96 % (11/19/23 0106)    Constitutional: A&Ox4  Eyes:  EOMI, No scleral icterus   HENT:   oropharynx moist, external ears normal, external nose normal   Respiratory:  No respiratory distress, no wheezing   Cardiovascular:  tachycardia, no murmurs   GI:  Soft, nondistended, no guarding   :  No costovertebral angle tenderness   Musculoskeletal:  R calf tenderness  Integument:  no jaundice, no rash   Neurologic:  Alert &awake, communicative, CN 2-12 normal,  no focal deficits noted   Psychiatric:  Speech and behavior appropriate       Lab Results: I have personally reviewed pertinent reports.  , I have personally reviewed pertinent films in PACS, and I have personally reviewed pertinent films in PACS with a Radiologist.    Results from last 7 days   Lab Units 11/18/23 2011   WBC Thousand/uL 14.38*   HEMOGLOBIN g/dL 14.1   HEMATOCRIT % 42.1   PLATELETS Thousands/uL 279   NEUTROS PCT % 83*   LYMPHS PCT % 8*   MONOS PCT % 8   EOS PCT % 1     Results from last 7 days   Lab Units 11/18/23 2011   POTASSIUM mmol/L 4.5   CHLORIDE mmol/L 100   CO2 mmol/L 23   BUN mg/dL 15   CREATININE mg/dL 1.00   CALCIUM mg/dL 11.2* ALK PHOS U/L 87   ALT U/L 15   AST U/L 20     Results from last 7 days   Lab Units 11/18/23 2011   INR  1.05         Imaging: I have personally reviewed pertinent reports.  , I have personally reviewed pertinent films in PACS, and I have personally reviewed pertinent films in PACS with a Radiologist.    CTA ED chest PE Study    Result Date: 11/18/2023  Narrative: CTA - CHEST WITH IV CONTRAST - PULMONARY ANGIOGRAM INDICATION:   SOB, R flank pain, R calf pain, recent COVID, elevated Dimer. COMPARISON: 5/15/2023. TECHNIQUE: CTA examination of the chest was performed using angiographic technique according to a protocol specifically tailored to evaluate for pulmonary embolism. Multiplanar 2D reformatted images were created from the source data. In addition, coronal 3D MIP postprocessing was performed on the acquisition scanner. Radiation dose length product (DLP) for this visit:  177.53 mGy-cm . This examination, like all CT scans performed in the [facility], was performed utilizing techniques to minimize radiation dose exposure, including the use of iterative  reconstruction and automated exposure control. IV Contrast:  80 mL of iohexol (OMNIPAQUE) FINDINGS: PULMONARY ARTERIAL TREE: Subsegmental pulmonary embolus at the medial right lower lobe (series 2, image 113) LUNGS: Scattered pulmonary nodules, for instance measuring 5 mm in the left upper lobe (series 6, image 38). There is no tracheal or endobronchial lesion. PLEURA:  Unremarkable. HEART/GREAT VESSELS:  Unremarkable for patient's age. No thoracic aortic aneurysm. MEDIASTINUM AND YOLANDA: Stable hilar and bilateral mediastinal adenopathy. CHEST WALL AND LOWER NECK:   Unremarkable. VISUALIZED STRUCTURES IN THE UPPER ABDOMEN:  Unremarkable. OSSEOUS STRUCTURES:  No acute fracture or destructive osseous lesion. Chronic height loss involving several thoracic vertebral bodies.      Impression: Subsegmental pulmonary embolus at the medial right lower lobe Stable hilar and bilateral mediastinal adenopathy. Findings discussed with Dr. Diann South at 11:02 p.m., 11/18/2023 Workstation performed: [id number]     XR chest 1 view portable    Result Date: 11/17/2023  Narrative: CHEST INDICATION:   cough. COMPARISON: Radiograph November 7, 2023 EXAM PERFORMED/VIEWS:  XR CHEST PORTABLE FINDINGS: Cardiomediastinal silhouette is enlarged, unchanged. The lungs are clear. No pneumothorax or pleural effusion. Osseous structures appear within normal limits for patient age. Impression: No acute cardiopulmonary disease. Stable enlarged cardiac silhouette. Workstation performed: [id number]     XR chest 2 views    Result Date: 11/8/2023  Narrative: CHEST INDICATION:   Cough. COMPARISON: CT chest 5/15/2020 EXAM PERFORMED/VIEWS:  XR CHEST PA & LATERAL FINDINGS: Cardiomediastinal silhouette appears unremarkable. Low lung volumes with crowding of lung markings. Pulmonary nodules identified on prior CT are occult on this exam. No appreciable pneumothorax. Osseous structures appear within normal limits for patient age. Impression: Low lung volumes with crowding of lung markings. Pulmonary nodules characterized on prior CT exams are not clearly seen on this exam. Resident: Natanael Johnson, the attending radiologist, have reviewed the images and agree with the final report above. Workstation performed: [id number]       Total time for visit, including counseling/coordination of care: 60 minutes. Greater than 50% of this total time spent on direct patient counseling and coorination of care. Epic Records Reviewed as well as Records in Care Everywhere    ** Please Note: Dragon 360 Dictation voice to text software was used in the creation of this document.  **

## 2023-11-19 NOTE — ASSESSMENT & PLAN NOTE
COVID positive back on November 7; on room air; no infiltrate noted on CT imaging  We will hold off of using dexamethasone or remdesivir for now and symptomatic treatment

## 2023-11-19 NOTE — PROGRESS NOTES
Patient admitted by Dr. Jasmina Pool this am    A/p  Acute pulmonary emboli  In the setting of covid   On heparin drip  Echo pending    2. Covid positive  Patient is on room air  No indication for oxygen  No need for remdesevir nor decadron at this time.

## 2023-11-19 NOTE — PLAN OF CARE
Problem: Potential for Falls  Goal: Patient will remain free of falls  Description: INTERVENTIONS:  - Educate patient/family on patient safety including physical limitations  - Instruct patient to call for assistance with activity   - Consult OT/PT to assist with strengthening/mobility   - Keep Call bell within reach  - Keep bed low and locked with side rails adjusted as appropriate  - Keep care items and personal belongings within reach  - Initiate and maintain comfort rounds  - Make Fall Risk Sign visible to staff  - Apply yellow socks and bracelet for high fall risk patients  - Consider moving patient to room near nurses station  Outcome: Progressing     Problem: PAIN - ADULT  Goal: Verbalizes/displays adequate comfort level or baseline comfort level  Description: Interventions:  - Encourage patient to monitor pain and request assistance  - Assess pain using appropriate pain scale  - Administer analgesics based on type and severity of pain and evaluate response  - Implement non-pharmacological measures as appropriate and evaluate response  - Consider cultural and social influences on pain and pain management  - Notify physician/advanced practitioner if interventions unsuccessful or patient reports new pain  Outcome: Progressing     Problem: INFECTION - ADULT  Goal: Absence or prevention of progression during hospitalization  Description: INTERVENTIONS:  - Assess and monitor for signs and symptoms of infection  - Monitor lab/diagnostic results  - Monitor all insertion sites, i.e. indwelling lines, tubes, and drains  - Monitor endotracheal if appropriate and nasal secretions for changes in amount and color  - [place] appropriate cooling/warming therapies per order  - Administer medications as ordered  - Instruct and encourage patient and family to use good hand hygiene technique  - Identify and instruct in appropriate isolation precautions for identified infection/condition  Outcome: Progressing  Goal: Absence of fever/infection during neutropenic period  Description: INTERVENTIONS:  - Monitor WBC    Outcome: Progressing     Problem: SAFETY ADULT  Goal: Maintain or return to baseline ADL function  Description: INTERVENTIONS:  -  Assess patient's ability to carry out ADLs; assess patient's baseline for ADL function and identify physical deficits which impact ability to perform ADLs (bathing, care of mouth/teeth, toileting, grooming, dressing, etc.)  - Assess/evaluate cause of self-care deficits   - Assess range of motion  - Assess patient's mobility; develop plan if impaired  - Assess patient's need for assistive devices and provide as appropriate  - Encourage maximum independence but intervene and supervise when necessary  - Involve family in performance of ADLs  - Assess for home care needs following discharge   - Consider OT consult to assist with ADL evaluation and planning for discharge  - Provide patient education as appropriate  Outcome: Progressing  Goal: Maintains/Returns to pre admission functional level  Description: INTERVENTIONS:  - Perform AM-PAC 6 Click Basic Mobility/ Daily Activity assessment daily.  - Set and communicate daily mobility goal to care team and patient/family/caregiver.    - Collaborate with rehabilitation services on mobility goals if consulted  - Out of bed for toileting  - Record patient progress and toleration of activity level   Outcome: Progressing     Problem: DISCHARGE PLANNING  Goal: Discharge to home or other facility with appropriate resources  Description: INTERVENTIONS:  - Identify barriers to discharge w/patient and caregiver  - Arrange for needed discharge resources and transportation as appropriate  - Identify discharge learning needs (meds, wound care, etc.)  - Arrange for interpretive services to assist at discharge as needed  - Refer to Case Management Department for coordinating discharge planning if the patient needs post-hospital services based on physician/advanced practitioner order or complex needs related to functional status, cognitive ability, or social support system  Outcome: Progressing     Problem: Knowledge Deficit  Goal: Patient/family/caregiver demonstrates understanding of disease process, treatment plan, medications, and discharge instructions  Description: Complete learning assessment and assess knowledge base. Interventions:  - Provide teaching at level of understanding  - Provide teaching via preferred learning methods  Outcome: Progressing     Problem: Nutrition/Hydration-ADULT  Goal: Nutrient/Hydration intake appropriate for improving, restoring or maintaining nutritional needs  Description: Monitor and assess patient's nutrition/hydration status for malnutrition. Collaborate with interdisciplinary team and initiate plan and interventions as ordered. Monitor patient's weight and dietary intake as ordered or per policy. Utilize nutrition screening tool and intervene as necessary. Determine patient's food preferences and provide high-protein, high-caloric foods as appropriate.      INTERVENTIONS:  - Monitor oral intake, urinary output, labs, and treatment plans  - Assess nutrition and hydration status and recommend course of action  - Evaluate amount of meals eaten  - Assist patient with eating if necessary   - Allow adequate time for meals  - Recommend/ encourage appropriate diets, oral nutritional supplements, and vitamin/mineral supplements  - Order, calculate, and assess calorie counts as needed  - Recommend, monitor, and adjust tube feedings and TPN/PPN based on assessed needs  - Assess need for intravenous fluids  - Provide specific nutrition/hydration education as appropriate  - Include patient/family/caregiver in decisions related to nutrition  Outcome: Progressing     Problem: CARDIOVASCULAR - ADULT  Goal: Maintains optimal cardiac output and hemodynamic stability  Description: INTERVENTIONS:  - Monitor I/O, vital signs and rhythm  - Monitor for S/S and trends of decreased cardiac output  - Administer and titrate ordered vasoactive medications to optimize hemodynamic stability  - Assess quality of pulses, skin color and temperature  - Assess for signs of decreased coronary artery perfusion  - Instruct patient to report change in severity of symptoms  Outcome: Progressing  Goal: Absence of cardiac dysrhythmias or at baseline rhythm  Description: INTERVENTIONS:  - Continuous cardiac monitoring, vital signs, obtain 12 lead EKG if ordered  - Administer antiarrhythmic and heart rate control medications as ordered  - Monitor electrolytes and administer replacement therapy as ordered  Outcome: Progressing     Problem: RESPIRATORY - ADULT  Goal: Achieves optimal ventilation and oxygenation  Description: INTERVENTIONS:  - Assess for changes in respiratory status  - Assess for changes in mentation and behavior  - Position to facilitate oxygenation and minimize respiratory effort  - Oxygen administered by appropriate delivery if ordered  - Initiate smoking cessation education as indicated  - Encourage broncho-pulmonary hygiene including cough, deep breathe, Incentive Spirometry  - Assess the need for suctioning and aspirate as needed  - Assess and instruct to report SOB or any respiratory difficulty  - Respiratory Therapy support as indicated  Outcome: Progressing

## 2023-11-19 NOTE — PLAN OF CARE
Problem: Potential for Falls  Goal: Patient will remain free of falls  Description: INTERVENTIONS:  - Educate patient/family on patient safety including physical limitations  - Instruct patient to call for assistance with activity   - Consult OT/PT to assist with strengthening/mobility   - Keep Call bell within reach  - Keep bed low and locked with side rails adjusted as appropriate  - Keep care items and personal belongings within reach  - Initiate and maintain comfort rounds  - Make Fall Risk Sign visible to staff  - Offer Toileting every 2 Hours, in advance of need  - Initiate/Maintain chair alarm  - Obtain necessary fall risk management equipment:   - Apply yellow socks and bracelet for high fall risk patients  - Consider moving patient to room near nurses station  Outcome: Progressing     Problem: PAIN - ADULT  Goal: Verbalizes/displays adequate comfort level or baseline comfort level  Description: Interventions:  - Encourage patient to monitor pain and request assistance  - Assess pain using appropriate pain scale  - Administer analgesics based on type and severity of pain and evaluate response  - Implement non-pharmacological measures as appropriate and evaluate response  - Consider cultural and social influences on pain and pain management  - Notify physician/advanced practitioner if interventions unsuccessful or patient reports new pain  Outcome: Progressing     Problem: INFECTION - ADULT  Goal: Absence or prevention of progression during hospitalization  Description: INTERVENTIONS:  - Assess and monitor for signs and symptoms of infection  - Monitor lab/diagnostic results  - Monitor all insertion sites, i.e. indwelling lines, tubes, and drains  - Monitor endotracheal if appropriate and nasal secretions for changes in amount and color  - [place] appropriate cooling/warming therapies per order  - Administer medications as ordered  - Instruct and encourage patient and family to use good hand hygiene technique  - Identify and instruct in appropriate isolation precautions for identified infection/condition  Outcome: Progressing  Goal: Absence of fever/infection during neutropenic period  Description: INTERVENTIONS:  - Monitor WBC    Outcome: Progressing     Problem: SAFETY ADULT  Goal: Maintain or return to baseline ADL function  Description: INTERVENTIONS:  -  Assess patient's ability to carry out ADLs; assess patient's baseline for ADL function and identify physical deficits which impact ability to perform ADLs (bathing, care of mouth/teeth, toileting, grooming, dressing, etc.)  - Assess/evaluate cause of self-care deficits   - Assess range of motion  - Assess patient's mobility; develop plan if impaired  - Assess patient's need for assistive devices and provide as appropriate  - Encourage maximum independence but intervene and supervise when necessary  - Involve family in performance of ADLs  - Assess for home care needs following discharge   - Consider OT consult to assist with ADL evaluation and planning for discharge  - Provide patient education as appropriate  Outcome: Progressing  Goal: Maintains/Returns to pre admission functional level  Description: INTERVENTIONS:  - Perform AM-PAC 6 Click Basic Mobility/ Daily Activity assessment daily.  - Set and communicate daily mobility goal to care team and patient/family/caregiver. - Collaborate with rehabilitation services on mobility goals if consulted  - Perform Range of Motion 3 times a day. - Reposition patient every 2 hours.   - Dangle patient 3 times a day  - Stand patient 3 times a day  - Ambulate patient 3 times a day  - Out of bed to chair 3 times a day   - Out of bed for meals 3 times a day  - Out of bed for toileting  - Record patient progress and toleration of activity level   Outcome: Progressing     Problem: DISCHARGE PLANNING  Goal: Discharge to home or other facility with appropriate resources  Description: INTERVENTIONS:  - Identify barriers to discharge w/patient and caregiver  - Arrange for needed discharge resources and transportation as appropriate  - Identify discharge learning needs (meds, wound care, etc.)  - Arrange for interpretive services to assist at discharge as needed  - Refer to Case Management Department for coordinating discharge planning if the patient needs post-hospital services based on physician/advanced practitioner order or complex needs related to functional status, cognitive ability, or social support system  Outcome: Progressing     Problem: Knowledge Deficit  Goal: Patient/family/caregiver demonstrates understanding of disease process, treatment plan, medications, and discharge instructions  Description: Complete learning assessment and assess knowledge base. Interventions:  - Provide teaching at level of understanding  - Provide teaching via preferred learning methods  Outcome: Progressing     Problem: Nutrition/Hydration-ADULT  Goal: Nutrient/Hydration intake appropriate for improving, restoring or maintaining nutritional needs  Description: Monitor and assess patient's nutrition/hydration status for malnutrition. Collaborate with interdisciplinary team and initiate plan and interventions as ordered. Monitor patient's weight and dietary intake as ordered or per policy. Utilize nutrition screening tool and intervene as necessary. Determine patient's food preferences and provide high-protein, high-caloric foods as appropriate.      INTERVENTIONS:  - Monitor oral intake, urinary output, labs, and treatment plans  - Assess nutrition and hydration status and recommend course of action  - Evaluate amount of meals eaten  - Assist patient with eating if necessary   - Allow adequate time for meals  - Recommend/ encourage appropriate diets, oral nutritional supplements, and vitamin/mineral supplements  - Order, calculate, and assess calorie counts as needed  - Recommend, monitor, and adjust tube feedings and TPN/PPN based on assessed needs  - Assess need for intravenous fluids  - Provide specific nutrition/hydration education as appropriate  - Include patient/family/caregiver in decisions related to nutrition  Outcome: Progressing     Problem: CARDIOVASCULAR - ADULT  Goal: Maintains optimal cardiac output and hemodynamic stability  Description: INTERVENTIONS:  - Monitor I/O, vital signs and rhythm  - Monitor for S/S and trends of decreased cardiac output  - Administer and titrate ordered vasoactive medications to optimize hemodynamic stability  - Assess quality of pulses, skin color and temperature  - Assess for signs of decreased coronary artery perfusion  - Instruct patient to report change in severity of symptoms  Outcome: Progressing  Goal: Absence of cardiac dysrhythmias or at baseline rhythm  Description: INTERVENTIONS:  - Continuous cardiac monitoring, vital signs, obtain 12 lead EKG if ordered  - Administer antiarrhythmic and heart rate control medications as ordered  - Monitor electrolytes and administer replacement therapy as ordered  Outcome: Progressing     Problem: RESPIRATORY - ADULT  Goal: Achieves optimal ventilation and oxygenation  Description: INTERVENTIONS:  - Assess for changes in respiratory status  - Assess for changes in mentation and behavior  - Position to facilitate oxygenation and minimize respiratory effort  - Oxygen administered by appropriate delivery if ordered  - Initiate smoking cessation education as indicated  - Encourage broncho-pulmonary hygiene including cough, deep breathe, Incentive Spirometry  - Assess the need for suctioning and aspirate as needed  - Assess and instruct to report SOB or any respiratory difficulty  - Respiratory Therapy support as indicated  Outcome: Progressing

## 2023-11-19 NOTE — ASSESSMENT & PLAN NOTE
UA nitrite positive, pyuria  History of multidrug-resistant UTI of Klebsiella as well as Morganella which were both sensitive to aztreonam  Start aztreonam and follow-up urine culture

## 2023-11-20 ENCOUNTER — APPOINTMENT (INPATIENT)
Dept: NON INVASIVE DIAGNOSTICS | Facility: HOSPITAL | Age: 73
DRG: 871 | End: 2023-11-20
Payer: COMMERCIAL

## 2023-11-20 LAB
ALBUMIN SERPL BCP-MCNC: 3.6 G/DL (ref 3.5–5)
ALP SERPL-CCNC: 70 U/L (ref 34–104)
ALT SERPL W P-5'-P-CCNC: 13 U/L (ref 7–52)
ANION GAP SERPL CALCULATED.3IONS-SCNC: 8 MMOL/L
AORTIC ROOT: 3 CM
AORTIC VALVE MEAN VELOCITY: 8.8 M/S
APICAL FOUR CHAMBER EJECTION FRACTION: 58 %
APTT PPP: 104 SECONDS (ref 23–37)
APTT PPP: 73 SECONDS (ref 23–37)
APTT PPP: 76 SECONDS (ref 23–37)
AST SERPL W P-5'-P-CCNC: 15 U/L (ref 13–39)
ATRIAL RATE: 77 BPM
AV AREA BY CONTINUOUS VTI: 1.6 CM2
AV AREA PEAK VELOCITY: 1.5 CM2
AV LVOT MEAN GRADIENT: 2 MMHG
AV LVOT PEAK GRADIENT: 3 MMHG
AV MEAN GRADIENT: 4 MMHG
AV PEAK GRADIENT: 7 MMHG
AV REGURGITATION PRESSURE HALF TIME: 599 MS
AV VALVE AREA: 1.6 CM2
AV VELOCITY RATIO: 0.68
BILIRUB SERPL-MCNC: 0.81 MG/DL (ref 0.2–1)
BUN SERPL-MCNC: 13 MG/DL (ref 5–25)
CALCIUM SERPL-MCNC: 9.5 MG/DL (ref 8.4–10.2)
CHLORIDE SERPL-SCNC: 103 MMOL/L (ref 96–108)
CO2 SERPL-SCNC: 22 MMOL/L (ref 21–32)
CREAT SERPL-MCNC: 0.99 MG/DL (ref 0.6–1.3)
DOP CALC AO PEAK VEL: 1.31 M/S
DOP CALC AO VTI: 26.31 CM
DOP CALC LVOT AREA: 2.27 CM2
DOP CALC LVOT CARDIAC INDEX: 1.97 L/MIN/M2
DOP CALC LVOT CARDIAC OUTPUT: 2.95 L/MIN
DOP CALC LVOT DIAMETER: 1.7 CM
DOP CALC LVOT PEAK VEL VTI: 18.59 CM
DOP CALC LVOT PEAK VEL: 0.89 M/S
DOP CALC LVOT STROKE INDEX: 30 ML/M2
DOP CALC LVOT STROKE VOLUME: 42.17
E WAVE DECELERATION TIME: 217 MS
E/A RATIO: 0.62
FRACTIONAL SHORTENING: 33 (ref 28–44)
GFR SERPL CREATININE-BSD FRML MDRD: 57 ML/MIN/1.73SQ M
GLUCOSE SERPL-MCNC: 101 MG/DL (ref 65–140)
INTERVENTRICULAR SEPTUM IN DIASTOLE (PARASTERNAL SHORT AXIS VIEW): 1 CM
INTERVENTRICULAR SEPTUM: 1 CM (ref 0.6–1.1)
LAAS-AP4: 15.7 CM2
LEFT ATRIUM AREA SYSTOLE SINGLE PLANE A4C: 14.8 CM2
LEFT INTERNAL DIMENSION IN SYSTOLE: 2.9 CM (ref 2.1–4)
LEFT VENTRICULAR INTERNAL DIMENSION IN DIASTOLE: 4.3 CM (ref 3.5–6)
LEFT VENTRICULAR POSTERIOR WALL IN END DIASTOLE: 1 CM
LEFT VENTRICULAR STROKE VOLUME: 51 ML
LVSV (TEICH): 51 ML
MAGNESIUM SERPL-MCNC: 2 MG/DL (ref 1.9–2.7)
MV E'TISSUE VEL-LAT: 7 CM/S
MV E'TISSUE VEL-SEP: 4 CM/S
MV PEAK A VEL: 0.6 M/S
MV PEAK E VEL: 37 CM/S
MV STENOSIS PRESSURE HALF TIME: 63 MS
MV VALVE AREA P 1/2 METHOD: 3.49
P AXIS: 50 DEGREES
POTASSIUM SERPL-SCNC: 4.1 MMOL/L (ref 3.5–5.3)
PR INTERVAL: 124 MS
PROT SERPL-MCNC: 7.2 G/DL (ref 6.4–8.4)
QRS AXIS: -46 DEGREES
QRSD INTERVAL: 70 MS
QT INTERVAL: 356 MS
QTC INTERVAL: 402 MS
RIGHT ATRIUM AREA SYSTOLE A4C: 13.5 CM2
RIGHT VENTRICLE ID DIMENSION: 4.1 CM
SL CV AV DECELERATION TIME RETROGRADE: 2065 MS
SL CV AV PEAK GRADIENT RETROGRADE: 55 MMHG
SL CV LV EF: 54
SL CV PED ECHO LEFT VENTRICLE DIASTOLIC VOLUME (MOD BIPLANE) 2D: 85 ML
SL CV PED ECHO LEFT VENTRICLE SYSTOLIC VOLUME (MOD BIPLANE) 2D: 33 ML
SODIUM SERPL-SCNC: 133 MMOL/L (ref 135–147)
T WAVE AXIS: 61 DEGREES
TR MAX PG: 14 MMHG
TR PEAK VELOCITY: 1.9 M/S
TRICUSPID ANNULAR PLANE SYSTOLIC EXCURSION: 1.7 CM
TRICUSPID VALVE PEAK REGURGITATION VELOCITY: 1.87 M/S
VENTRICULAR RATE: 77 BPM

## 2023-11-20 PROCEDURE — 99232 SBSQ HOSP IP/OBS MODERATE 35: CPT

## 2023-11-20 PROCEDURE — 85730 THROMBOPLASTIN TIME PARTIAL: CPT | Performed by: INTERNAL MEDICINE

## 2023-11-20 PROCEDURE — 93010 ELECTROCARDIOGRAM REPORT: CPT | Performed by: INTERNAL MEDICINE

## 2023-11-20 PROCEDURE — 83735 ASSAY OF MAGNESIUM: CPT | Performed by: HOSPITALIST

## 2023-11-20 PROCEDURE — 93306 TTE W/DOPPLER COMPLETE: CPT

## 2023-11-20 PROCEDURE — 93306 TTE W/DOPPLER COMPLETE: CPT | Performed by: INTERNAL MEDICINE

## 2023-11-20 PROCEDURE — 93970 EXTREMITY STUDY: CPT | Performed by: SURGERY

## 2023-11-20 PROCEDURE — 93970 EXTREMITY STUDY: CPT

## 2023-11-20 PROCEDURE — 85730 THROMBOPLASTIN TIME PARTIAL: CPT | Performed by: HOSPITALIST

## 2023-11-20 PROCEDURE — 80053 COMPREHEN METABOLIC PANEL: CPT | Performed by: HOSPITALIST

## 2023-11-20 RX ADMIN — PANTOPRAZOLE SODIUM 40 MG: 40 TABLET, DELAYED RELEASE ORAL at 05:51

## 2023-11-20 RX ADMIN — GUAIFENESIN 600 MG: 600 TABLET, EXTENDED RELEASE ORAL at 10:25

## 2023-11-20 RX ADMIN — ACETAMINOPHEN 325MG 650 MG: 325 TABLET ORAL at 16:33

## 2023-11-20 RX ADMIN — BENZONATATE 100 MG: 100 CAPSULE ORAL at 21:41

## 2023-11-20 RX ADMIN — GUAIFENESIN 600 MG: 600 TABLET, EXTENDED RELEASE ORAL at 21:41

## 2023-11-20 RX ADMIN — BENZONATATE 100 MG: 100 CAPSULE ORAL at 14:51

## 2023-11-20 RX ADMIN — AZTREONAM 1000 MG: 1 INJECTION, POWDER, LYOPHILIZED, FOR SOLUTION INTRAMUSCULAR; INTRAVENOUS at 21:41

## 2023-11-20 RX ADMIN — AZTREONAM 1000 MG: 1 INJECTION, POWDER, LYOPHILIZED, FOR SOLUTION INTRAMUSCULAR; INTRAVENOUS at 05:51

## 2023-11-20 RX ADMIN — PRAVASTATIN SODIUM 40 MG: 40 TABLET ORAL at 16:30

## 2023-11-20 RX ADMIN — MIRTAZAPINE 7.5 MG: 15 TABLET, FILM COATED ORAL at 21:41

## 2023-11-20 RX ADMIN — ACETAMINOPHEN 325MG 650 MG: 325 TABLET ORAL at 06:11

## 2023-11-20 RX ADMIN — BENZONATATE 100 MG: 100 CAPSULE ORAL at 05:51

## 2023-11-20 RX ADMIN — HEPARIN SODIUM 16 UNITS/KG/HR: 10000 INJECTION, SOLUTION INTRAVENOUS at 03:57

## 2023-11-20 RX ADMIN — AZTREONAM 1000 MG: 1 INJECTION, POWDER, LYOPHILIZED, FOR SOLUTION INTRAMUSCULAR; INTRAVENOUS at 14:51

## 2023-11-20 RX ADMIN — GABAPENTIN 100 MG: 100 CAPSULE ORAL at 21:41

## 2023-11-20 NOTE — CASE MANAGEMENT
Case Management Assessment & Discharge Planning Note    Patient name Nathanael Hernandes  Location [address] [place] [address]-* MRN [id number]  : 1950 Date 2023       Current Admission Date: 2023  Current Admission Diagnosis:Pulmonary embolism [facility])   Patient Active Problem List    Diagnosis Date Noted    Acute cystitis with hematuria 2023    Pulmonary embolism ([address]) 2023    COVID 2023    SOB (shortness of breath) on exertion 2023    Left arm pain 2023    Mixed hyperlipidemia 2022    Sleep disturbance 2022    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 2022    Other hydronephrosis 2021    Incomplete bladder emptying 2021    Pelvic floor dysfunction 2021    OAB (overactive bladder) 09/15/2020    Bilirubin in urine 2020    Proteinuria 2020    Hilar adenopathy 01/15/2020    Vitamin D deficiency 2018    Age-related osteoporosis without current pathological fracture 2018    Incidental lung nodule, > 3mm and < 8mm 2018    Recurrent UTI 2018    Gastroesophageal reflux disease without esophagitis 2018    Class 1 obesity in adult 2018    Alopecia areata 2018      LOS (days): 1  Geometric Mean LOS (GMLOS) (days):   Days to GMLOS:     OBJECTIVE:    Risk of Unplanned Readmission Score: 10.41         Current admission status: Inpatient       Preferred Pharmacy:   [place] [id number]  Phone: [phone] Fax: [phone]    Primary Care Provider: [facility]    Primary Insurance: [facility] REP  Secondary Insurance:     ASSESSMENT:  [place] Proxies    There are no active Health Care Proxies on file. Patient Information  Admitted from[de-identified] Home  Mental Status: Alert  During Assessment patient was accompanied by: Daughter  Support Systems: Daughter, Family members  Home entry access options.  Select all that apply.: Stairs  Number of steps to enter home.: 6  Type of Current Residence: Apartment  Floor Level: 1  Living Arrangements: Lives w/ Daughter  Is patient a ?: No    Activities of Daily Living Prior to Admission  Functional Status: Independent  Completes ADLs independently?: Yes  Ambulates independently?: Yes  Does patient use assisted devices?: No  Does patient currently own DME?: Yes  What DME does the patient currently own?: Shayy Mendoza  Does patient have a history of Outpatient Therapy (PT/OT)?: No  Does the patient have a history of Short-Term Rehab?: No  Does patient have a history of HHC?: No  Does patient currently have [address]?: No         Patient Information Continued  Income Source: Pension/jail         Means of Transportation  Means of Transport to Appts[de-identified] Family transport      Housing Stability: Not on file   Food Insecurity: Not on file   Transportation Needs: No Transportation Needs (8/1/2023)    PRAPARE - Transportation     Lack of Transportation (Medical): No     Lack of Transportation (Non-Medical): No   Utilities: Not on file       DISCHARGE DETAILS:    Discharge planning discussed with[de-identified] Iliana Horner  Daughter  [phone]  Freedom of Choice: Yes     CM contacted family/caregiver?: Yes  Were Treatment Team discharge recommendations reviewed with patient/caregiver?: Yes  Did patient/caregiver verbalize understanding of patient care needs?: Yes  Were patient/caregiver advised of the risks associated with not following Treatment Team discharge recommendations?: Yes    Contacts  Patient ContactsIliana Jeffries  [phone]  Relationship to Patient[de-identified] Family  Reason/Outcome: Emergency Contact, Discharge Planning         Additional Comments:  spoke to patient's daughter. No CM needs noted at this time. Patient lives with family and is fully independent.

## 2023-11-20 NOTE — PLAN OF CARE
Problem: Potential for Falls  Goal: Patient will remain free of falls  Description: INTERVENTIONS:  - Educate patient/family on patient safety including physical limitations  - Instruct patient to call for assistance with activity   - Consult OT/PT to assist with strengthening/mobility   - Keep Call bell within reach  - Keep bed low and locked with side rails adjusted as appropriate  - Keep care items and personal belongings within reach  - Initiate and maintain comfort rounds  - Make Fall Risk Sign visible to staff  - Offer Toileting every 2 Hours, in advance of need  - Initiate/Maintain bed alarm  - Obtain necessary fall risk management equipment:   - Apply yellow socks and bracelet for high fall risk patients  - Consider moving patient to room near nurses station  Outcome: Progressing     Problem: PAIN - ADULT  Goal: Verbalizes/displays adequate comfort level or baseline comfort level  Description: Interventions:  - Encourage patient to monitor pain and request assistance  - Assess pain using appropriate pain scale  - Administer analgesics based on type and severity of pain and evaluate response  - Implement non-pharmacological measures as appropriate and evaluate response  - Consider cultural and social influences on pain and pain management  - Notify physician/advanced practitioner if interventions unsuccessful or patient reports new pain  Outcome: Progressing     Problem: INFECTION - ADULT  Goal: Absence or prevention of progression during hospitalization  Description: INTERVENTIONS:  - Assess and monitor for signs and symptoms of infection  - Monitor lab/diagnostic results  - Monitor all insertion sites, i.e. indwelling lines, tubes, and drains  - Monitor endotracheal if appropriate and nasal secretions for changes in amount and color  - [place] appropriate cooling/warming therapies per order  - Administer medications as ordered  - Instruct and encourage patient and family to use good hand hygiene technique  - Identify and instruct in appropriate isolation precautions for identified infection/condition  Outcome: Progressing  Goal: Absence of fever/infection during neutropenic period  Description: INTERVENTIONS:  - Monitor WBC    Outcome: Progressing     Problem: SAFETY ADULT  Goal: Maintain or return to baseline ADL function  Description: INTERVENTIONS:  -  Assess patient's ability to carry out ADLs; assess patient's baseline for ADL function and identify physical deficits which impact ability to perform ADLs (bathing, care of mouth/teeth, toileting, grooming, dressing, etc.)  - Assess/evaluate cause of self-care deficits   - Assess range of motion  - Assess patient's mobility; develop plan if impaired  - Assess patient's need for assistive devices and provide as appropriate  - Encourage maximum independence but intervene and supervise when necessary  - Involve family in performance of ADLs  - Assess for home care needs following discharge   - Consider OT consult to assist with ADL evaluation and planning for discharge  - Provide patient education as appropriate  Outcome: Progressing  Goal: Maintains/Returns to pre admission functional level  Description: INTERVENTIONS:  - Perform AM-PAC 6 Click Basic Mobility/ Daily Activity assessment daily.  - Set and communicate daily mobility goal to care team and patient/family/caregiver. - Collaborate with rehabilitation services on mobility goals if consulted  - Perform Range of Motion 3 times a day. - Reposition patient every 2 hours.   - Dangle patient 3 times a day  - Stand patient 3 times a day  - Ambulate patient 3 times a day  - Out of bed to chair 3 times a day   - Out of bed for meals 3 times a day  - Out of bed for toileting  - Record patient progress and toleration of activity level   Outcome: Progressing     Problem: DISCHARGE PLANNING  Goal: Discharge to home or other facility with appropriate resources  Description: INTERVENTIONS:  - Identify barriers to discharge w/patient and caregiver  - Arrange for needed discharge resources and transportation as appropriate  - Identify discharge learning needs (meds, wound care, etc.)  - Arrange for interpretive services to assist at discharge as needed  - Refer to Case Management Department for coordinating discharge planning if the patient needs post-hospital services based on physician/advanced practitioner order or complex needs related to functional status, cognitive ability, or social support system  Outcome: Progressing     Problem: Knowledge Deficit  Goal: Patient/family/caregiver demonstrates understanding of disease process, treatment plan, medications, and discharge instructions  Description: Complete learning assessment and assess knowledge base. Interventions:  - Provide teaching at level of understanding  - Provide teaching via preferred learning methods  Outcome: Progressing     Problem: Nutrition/Hydration-ADULT  Goal: Nutrient/Hydration intake appropriate for improving, restoring or maintaining nutritional needs  Description: Monitor and assess patient's nutrition/hydration status for malnutrition. Collaborate with interdisciplinary team and initiate plan and interventions as ordered. Monitor patient's weight and dietary intake as ordered or per policy. Utilize nutrition screening tool and intervene as necessary. Determine patient's food preferences and provide high-protein, high-caloric foods as appropriate.      INTERVENTIONS:  - Monitor oral intake, urinary output, labs, and treatment plans  - Assess nutrition and hydration status and recommend course of action  - Evaluate amount of meals eaten  - Assist patient with eating if necessary   - Allow adequate time for meals  - Recommend/ encourage appropriate diets, oral nutritional supplements, and vitamin/mineral supplements  - Order, calculate, and assess calorie counts as needed  - Recommend, monitor, and adjust tube feedings and TPN/PPN based on assessed needs  - Assess need for intravenous fluids  - Provide specific nutrition/hydration education as appropriate  - Include patient/family/caregiver in decisions related to nutrition  Outcome: Progressing     Problem: CARDIOVASCULAR - ADULT  Goal: Maintains optimal cardiac output and hemodynamic stability  Description: INTERVENTIONS:  - Monitor I/O, vital signs and rhythm  - Monitor for S/S and trends of decreased cardiac output  - Administer and titrate ordered vasoactive medications to optimize hemodynamic stability  - Assess quality of pulses, skin color and temperature  - Assess for signs of decreased coronary artery perfusion  - Instruct patient to report change in severity of symptoms  Outcome: Progressing  Goal: Absence of cardiac dysrhythmias or at baseline rhythm  Description: INTERVENTIONS:  - Continuous cardiac monitoring, vital signs, obtain 12 lead EKG if ordered  - Administer antiarrhythmic and heart rate control medications as ordered  - Monitor electrolytes and administer replacement therapy as ordered  Outcome: Progressing     Problem: RESPIRATORY - ADULT  Goal: Achieves optimal ventilation and oxygenation  Description: INTERVENTIONS:  - Assess for changes in respiratory status  - Assess for changes in mentation and behavior  - Position to facilitate oxygenation and minimize respiratory effort  - Oxygen administered by appropriate delivery if ordered  - Initiate smoking cessation education as indicated  - Encourage broncho-pulmonary hygiene including cough, deep breathe, Incentive Spirometry  - Assess the need for suctioning and aspirate as needed  - Assess and instruct to report SOB or any respiratory difficulty  - Respiratory Therapy support as indicated  Outcome: Progressing

## 2023-11-20 NOTE — ASSESSMENT & PLAN NOTE
UA nitrite positive, pyuria  Urine culture growing gram negative rods  History of multidrug-resistant UTI of Klebsiella as well as Morganella which were both sensitive to aztreonam  continue aztreonam day 2 and follow-up urine culture

## 2023-11-20 NOTE — ASSESSMENT & PLAN NOTE
Right upper back pain as well as pleuritic chest pain over the last several days and right calf pain  COVID-positive back on November 7  CTA PE protocol with subsegmental pulmonary embolus at the medial right lobe as well as stable hilar and bilateral mediastinal adenopathy  On room air with normal O2 sats  BNP unremarkable   In addition to being COVID-positive, patient used estradiol vaginal cream for recurrent UTIs; denies any recent long car trips or surgeries  Continue telemetry   Continue heparin drip  2D echo pendng   bilateral venous duplex to assess for DVT pending

## 2023-11-20 NOTE — ASSESSMENT & PLAN NOTE
COVID positive back on November 7; on room air; no infiltrate noted on CT imaging  hold off of using dexamethasone or remdesivir for now  Continue symptomatic treatment

## 2023-11-20 NOTE — UTILIZATION REVIEW
Initial Clinical Review    ED TREATMENT TIME  11/18 @ 2000    Admission: Date/Time/Statement:   Admission Orders (From admission, onward)       Ordered        11/19/23 0003  INPATIENT ADMISSION  Once                          Orders Placed This Encounter   Procedures    INPATIENT ADMISSION     Standing Status:   Standing     Number of Occurrences:   1     Order Specific Question:   Level of Care     Answer:   Med Surg [16]     Order Specific Question:   Estimated length of stay     Answer:   More than 2 Midnights     Order Specific Question:   Certification     Answer:   I certify that inpatient services are medically necessary for this patient for a duration of greater than two midnights. See H&P and MD Progress Notes for additional information about the patient's course of treatment. ED Arrival Information       Expected   11/18/2023     Arrival   11/18/2023 18:18    Acuity   Urgent              Means of arrival   Walk-In    Escorted by   Family Member    Service   Hospitalist    Admission type   Emergency              Arrival complaint   Back Pain             Chief Complaint   Patient presents with    Back Pain     Right sided back pain that radiates to the front of pt torso. Pt states pain is worse when she takes a deep breathe. Initial Presentation: 68 y.o. female presents to ED from [facility] with right sided upper back pain, pleuritic chest pain, worsening over the past several days. Has right calf for about  1 week. Diagnosed with COVID  on  11/7, symptoms  headache, rhinorrhea, chills and sore throat. Several days later started with right calf pain. Pleuritic  chest pain  and right upper back pain  started in the past  couple days. URI symptoms improved, still with pleuritic chest  pain. No recent  long car trips. U/A  positive  nitrite/pyuria. NO infiltrate on  Ct scan. Continue symptomatic treatment for   COVID, hold  on  decadron and remdesivir. CTA  chest shows  PE.    Admit  Ip with Pulmonary Embolism, COVID, Acute cystitis with hematuria and plan is  tele, IV  heparin, 2 DE,  B/L  VDE, STEPHANIE, monitor labs, urine culture and monitor  O2 sats. Date:  11/20    Day 3: Has surpassed a 2nd midnight with active treatments and services, which include . Remains  on  IV  heparin/STEPHANIE. Monitor respiratory status.           ED Triage Vitals   Temperature Pulse Respirations Blood Pressure SpO2   11/18/23 1829 11/18/23 1829 11/18/23 1829 11/18/23 1829 11/18/23 1829   99.2 °F (37.3 °C) 103 16 129/77 95 %      Temp Source Heart Rate Source Patient Position - Orthostatic VS BP Location FiO2 (%)   11/18/23 1829 11/18/23 1829 11/18/23 1829 11/18/23 1829 --   Oral Monitor Sitting Right arm       Pain Score       11/19/23 0112       6          Wt Readings from Last 1 Encounters:   11/20/23 63 kg (139 lb)     Additional Vital Signs:   98.8 °F (37.1 °C) 65 18 108/70 83 94 % None (Room air) Lying    11/19/23 23:21:31 99.3 °F (37.4 °C) 87 16 122/83 96 94 % None (Room air) Lying   11/19/23 22:07:01 -- 83 -- 113/71 85 96 % -- --   11/19/23 2204 -- -- -- 113/71 -- -- -- --   11/19/23 18:29:23 100 °F (37.8 °C) 86 -- -- -- 95 % -- --   11/19/23 16:24:28 100.9 °F (38.3 °C) Abnormal  85 -- -- -- 95 % -- --   11/19/23 15:49:17 102.1 °F (38.9 °C) Abnormal  89 -- 126/78 94 93 % -- --   11/19/23 15:48:14 102.1 °F (38.9 °C) Abnormal  84 -- 126/78 94 94 % -- --   11/19/23 07:43:31 99.9 °F (37.7 °C) 69 17 119/75 90 94 % None (Room air) Sitting   11/19/23 02:17:58 -- 89 -- 157/83 108 94 % -- --   11/19/23 01:06:36 99.3 °F (37.4 °C) 92 16 143/85 104 96 % -- --   11/19/23 0030 -- 92 22 136/92 110 97 % None (Room air) Lying   11/19/23 0000 -- 98 20 151/73 -- 97 % None (Room air) Sitting   11/18/23 2300 -- -- -- -- -- -- None (Room air) --   11/18/23 2152 -- 83 18 141/84 108 96 % None (Room air) Sitting   11/18/23 1829 99.2 °F (37.3 °C) 103 16 129/77 -- 95 % None (Room air) Sitting     Pertinent Labs/Diagnostic Test Results:   CTA ED chest PE Study   Final Result by Nura Parsons MD (11/18 2305)      Subsegmental pulmonary embolus at the medial right lower lobe      Stable hilar and bilateral mediastinal adenopathy.       Findings discussed with Dr. Diann South at 11:02 p.m., 11/18/2023      Workstation performed: [id number]         VAS lower limb venous duplex study, complete bilateral    (Results Pending)         Results from last 7 days   Lab Units 11/19/23  0651 11/18/23 2011   WBC Thousand/uL 11.03* 14.38*   HEMOGLOBIN g/dL 13.8 14.1   HEMATOCRIT % 40.9 42.1   PLATELETS Thousands/uL 266 279   NEUTROS ABS Thousands/µL 8.24* 12.03*         Results from last 7 days   Lab Units 11/20/23  0405 11/18/23 2011   SODIUM mmol/L 133* 132*   POTASSIUM mmol/L 4.1 4.5   CHLORIDE mmol/L 103 100   CO2 mmol/L 22 23   ANION GAP mmol/L 8 9   BUN mg/dL 13 15   CREATININE mg/dL 0.99 1.00   EGFR ml/min/1.73sq m 57 56   CALCIUM mg/dL 9.5 11.2*   MAGNESIUM mg/dL 2.0  --      Results from last 7 days   Lab Units 11/20/23  0405 11/18/23 2011   AST U/L 15 20   ALT U/L 13 15   ALK PHOS U/L 70 87   TOTAL PROTEIN g/dL 7.2 8.1   ALBUMIN g/dL 3.6 4.1   TOTAL BILIRUBIN mg/dL 0.81 0.88         Results from last 7 days   Lab Units 11/20/23  0405 11/18/23 2011   GLUCOSE RANDOM mg/dL 101 108           Results from last 7 days   Lab Units 11/18/23 2011   HS TNI 0HR ng/L 4     Results from last 7 days   Lab Units 11/18/23 2011   D-DIMER QUANTITATIVE ug/ml FEU 2.50*     Results from last 7 days   Lab Units 11/20/23  1054 11/20/23  0405 11/19/23  2032 11/19/23  0809 11/18/23 2011   PROTIME seconds  --   --   --  15.1* 13.9   INR   --   --   --  1.17 1.05   PTT seconds 76* 104* 84* 92* 36                         Results from last 7 days   Lab Units 11/18/23 2011   BNP pg/mL 10               Results from last 7 days   Lab Units 11/18/23  2154   CLARITY UA  Turbid   COLOR UA  Dark Yellow   SPEC GRAV UA  1.006   PH UA  5.5   GLUCOSE UA mg/dl Negative   KETONES UA mg/dl Negative BLOOD UA  Trace*   PROTEIN UA mg/dl 30 (1+)*   NITRITE UA  Positive*   BILIRUBIN UA  Negative   UROBILINOGEN UA (BE) mg/dl <2.0   LEUKOCYTES UA  Large*   WBC UA /hpf Innumerable*   RBC UA /hpf 4-10*   BACTERIA UA /hpf Innumerable*   EPITHELIAL CELLS WET PREP /hpf Occasional   MUCUS THREADS  Occasional*                                 Results from last 7 days   Lab Units 11/18/23 2154   URINE CULTURE  >100,000 cfu/ml Gram Negative Mak Enteric Like*                   ED Treatment:   Medication Administration from 11/18/2023 1817 to 11/19/2023 0104         Date/Time Order Dose Route Action Comments     11/18/2023 2010 EST lidocaine (LIDODERM) 5 % patch 1 patch 1 patch Topical Medication Applied --     11/18/2023 2125 EST iohexol (OMNIPAQUE) 350 MG/ML injection (MULTI-DOSE) 80 mL 80 mL Intravenous Given --     11/18/2023 2351 EST cefTRIAXone (ROCEPHIN) IVPB (premix in dextrose) 1,000 mg 50 mL 1,000 mg Intravenous New Bag --     11/18/2023 2350 EST heparin (porcine) injection 4,800 Units 4,800 Units Intravenous Given --     11/18/2023 2351 EST heparin (porcine) 25,000 units in 0.45% NaCl 250 mL infusion (premix) 18 Units/kg/hr Intravenous New Bag --            Present on Admission:   Acute cystitis with hematuria   Pulmonary embolism ([address])   COVID      Admitting Diagnosis: Back pain [M54.9]  UTI (urinary tract infection) [N39.0]  Flank pain [R10.9]  Single subsegmental pulmonary embolism without acute cor pulmonale (HCC) [I26.93]  Age/Sex: 68 y.o. female  Admission Orders:  Scheduled Medications:  aztreonam, 1,000 mg, Intravenous, Q8H  benzonatate, 100 mg, Oral, Q8H JESSICA  gabapentin, 100 mg, Oral, HS  guaiFENesin, 600 mg, Oral, Q12H JESSICA  mirtazapine, 7.5 mg, Oral, HS  pantoprazole, 40 mg, Oral, Early Morning  pravastatin, 40 mg, Oral, Daily With Dinner  terazosin, 1 mg, Oral, HS      Continuous IV Infusions:  heparin (porcine), 3-30 Units/kg/hr (Order-Specific), Intravenous, Titrated      PRN Meds:  acetaminophen, 650 mg, Oral, Q6H PRN  heparin (porcine), 2,400 Units, Intravenous, Q6H PRN  heparin (porcine), 4,800 Units, Intravenous, Q6H PRN          Network Utilization Review Department  ATTENTION: Please call with any questions or concerns to [phone] and carefully listen to the prompts so that you are directed to the right person. All voicemails are confidential.   For Discharge needs, contact Care Management DC Support Team at [phone] opt. [phone]  Send all requests for admission clinical reviews, approved or denied determinations and any other requests to dedicated fax number below belonging to the campus where the patient is receiving treatment.  List of dedicated fax numbers for the Facilities:  [place] DENIALS (Administrative/Medical Necessity) [phone]   DISCHARGE SUPPORT TEAM (NETWORK) [id number] (Maternity/NICU/Pediatrics) [phone]   [address] 1521 [facility] 1000 [facility] [phone]   [address] [place] [address] [address] [address] [facility] [address] [facility] [address] [phone]

## 2023-11-20 NOTE — CASE MANAGEMENT
Case Management Assessment & Discharge Planning Note    Patient name Read Arpan  Location [address] [facility] 5 [address] [facility] [place] [address]-* MRN [id number]  : 1950 Date 2023       Current Admission Date: 2023  Current Admission Diagnosis:Pulmonary embolism [facility])   Patient Active Problem List    Diagnosis Date Noted    Acute cystitis with hematuria 2023    Pulmonary embolism ([address]) 2023    COVID 2023    SOB (shortness of breath) on exertion 2023    Left arm pain 2023    Mixed hyperlipidemia 2022    Sleep disturbance 2022    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 2022    Other hydronephrosis 2021    Incomplete bladder emptying 2021    Pelvic floor dysfunction 2021    OAB (overactive bladder) 09/15/2020    Bilirubin in urine 2020    Proteinuria 2020    Hilar adenopathy 01/15/2020    Vitamin D deficiency 2018    Age-related osteoporosis without current pathological fracture 2018    Incidental lung nodule, > 3mm and < 8mm 2018    Recurrent UTI 2018    Gastroesophageal reflux disease without esophagitis 2018    Class 1 obesity in adult 2018    Alopecia areata 2018      LOS (days): 1  Geometric Mean LOS (GMLOS) (days):   Days to GMLOS:     OBJECTIVE:    Risk of Unplanned Readmission Score: 10.41         Current admission status: Inpatient       Preferred Pharmacy:   [facility], [address] [place] [address]  Phone: [phone] Fax: [phone]    Primary Care Provider: [facility] Florez    Primary Insurance: [employer] [address] [facility]  Secondary Insurance:     ASSESSMENT:  [place] Proxies    There are no active Health Care Proxies on file. Patient Information  Admitted from[de-identified] Home  Mental Status: Alert  During Assessment patient was accompanied by: Daughter  Support Systems: Daughter, Family members  Home entry access options.  Select all that apply.: Stairs  Number of steps to enter home.: 6  Type of Current Residence: Apartment  Floor Level: 1  Living Arrangements: Lives w/ Daughter  Is patient a ?: No    Activities of Daily Living Prior to Admission  Functional Status: Independent  Completes ADLs independently?: Yes  Ambulates independently?: Yes  Does patient use assisted devices?: No  Does patient currently own DME?: Yes  What DME does the patient currently own?: Bud Abdalla  Does patient have a history of Outpatient Therapy (PT/OT)?: No  Does the patient have a history of Short-Term Rehab?: No  Does patient have a history of HHC?: No  Does patient currently have [address]?: No         Patient Information Continued  Income Source: Pension/long-term         Means of [employer] Insurance of Transport to Appts[de-identified] Family transport      Housing Stability: [address]  (11/20/2023)    Housing Stability Vital Sign     Unable to Pay for Housing in the Last Year: No     Number of Places Lived in the Last Year: 1     Unstable Housing in the Last Year: No   Food Insecurity: No Food Insecurity (11/20/2023)    Hunger Vital Sign     Worried About Running Out of Food in the Last Year: Never true     Ran Out of Food in the Last Year: Never true   Transportation Needs: No Transportation Needs (11/20/2023)    PRAPARE - Transportation     Lack of Transportation (Medical): No     Lack of Transportation (Non-Medical):  No   Utilities: Not At Risk (11/20/2023)    C Utilities     Threatened with loss of utilities: No       DISCHARGE DETAILS:    Discharge planning discussed with[de-identified] Miguelangel Fofana  Daughter  [phone]  Freedom of Choice: Yes     CM contacted family/caregiver?: Yes  Were Treatment Team discharge recommendations reviewed with patient/caregiver?: Yes  Did patient/caregiver verbalize understanding of patient care needs?: Yes  Were patient/caregiver advised of the risks associated with not following Treatment Team discharge recommendations?: Yes    Contacts  Patient ContactsIliana Horner  Daughter  [phone]  Relationship to Patient[de-identified] Family  Reason/Outcome: Emergency Contact, Discharge Planning                                                                     Additional Comments:  spoke to patient's daughter. No CM needs noted at this time. Patient lives with family and is fully independent.

## 2023-11-20 NOTE — PROGRESS NOTES
[facility]  Progress Note  Name: Alona Engle  MRN: [id number]  Unit/Bed#: E5 -01 I Date of Admission: 11/18/2023   Date of Service: 11/20/2023 I Hospital Day: 1    Assessment/Plan   * Pulmonary embolism [facility])  Assessment & Plan  Right upper back pain as well as pleuritic chest pain over the last several days and right calf pain  COVID-positive back on November 7  CTA PE protocol with subsegmental pulmonary embolus at the medial right lobe as well as stable hilar and bilateral mediastinal adenopathy  On room air with normal O2 sats  BNP unremarkable   In addition to being COVID-positive, patient used estradiol vaginal cream for recurrent UTIs; denies any recent long car trips or surgeries  Continue telemetry   Continue heparin drip  2D echo pendng   bilateral venous duplex to assess for DVT pending    Acute cystitis with hematuria  Assessment & Plan  UA nitrite positive, pyuria  Urine culture growing gram negative rods  History of multidrug-resistant UTI of Klebsiella as well as Morganella which were both sensitive to aztreonam  continue aztreonam day 2 and follow-up urine culture    COVID  Assessment & Plan  COVID positive back on November 7; on room air; no infiltrate noted on CT imaging  hold off of using dexamethasone or remdesivir for now  Continue symptomatic treatment               VTE Pharmacologic Prophylaxis: VTE Score: 6 High Risk (Score >/= 5) - Pharmacological DVT Prophylaxis Ordered: heparin drip. Sequential Compression Devices Ordered. Patient Centered Rounds: I performed bedside rounds with nursing staff today. Discussions with Specialists or Other Care Team Provider: CM    Education and Discussions with Family / Patient: Updated  (daughter) at bedside. Total Time Spent on Date of Encounter in care of patient: 30 mins.  This time was spent on one or more of the following: performing physical exam; counseling and coordination of care; obtaining or reviewing history; documenting in the medical record; reviewing/ordering tests, medications or procedures; communicating with other healthcare professionals and discussing with patient's family/caregivers. Current Length of Stay: 1 day(s)  Current Patient Status: Inpatient   Certification Statement: The patient will continue to require additional inpatient hospital stay due to PE pending work up  Discharge Plan: Anticipate discharge in 24-48 hrs to home. Code Status: Level 1 - Full Code    Subjective:   Patient was seen laying in bed today. She reports pleuritic chest pain. She report shortness of breath with movement. She reports cough. She denies any other acute complaints. Objective:     Vitals:   Temp (24hrs), Av.5 °F (38.1 °C), Min:98.8 °F (37.1 °C), Max:102.1 °F (38.9 °C)    Temp:  [98.8 °F (37.1 °C)-102.1 °F (38.9 °C)] 98.8 °F (37.1 °C)  HR:  [65-89] 75  Resp:  [16-18] 18  BP: (108-126)/(70-83) 108/70  SpO2:  [93 %-96 %] 94 %  Body mass index is 32.31 kg/m². Input and Output Summary (last 24 hours):   No intake or output data in the 24 hours ending 23 1317    Physical Exam:   Physical Exam  Vitals and nursing note reviewed. Constitutional:       Appearance: Normal appearance. HENT:      Head: Normocephalic and atraumatic. Eyes:      General: No scleral icterus. Cardiovascular:      Rate and Rhythm: Normal rate and regular rhythm. Pulmonary:      Effort: Pulmonary effort is normal.      Breath sounds: Normal breath sounds. Abdominal:      General: Abdomen is flat. Bowel sounds are normal.      Palpations: Abdomen is soft. Tenderness: There is no abdominal tenderness. Musculoskeletal:      Right lower leg: No edema. Left lower leg: No edema. Skin:     General: Skin is warm and dry. Neurological:      Mental Status: She is alert. Mental status is at baseline.    Psychiatric:         Mood and Affect: Mood normal.       Additional Data: Labs:  Results from last 7 days   Lab Units 11/19/23  0651   WBC Thousand/uL 11.03*   HEMOGLOBIN g/dL 13.8   HEMATOCRIT % 40.9   PLATELETS Thousands/uL 266   NEUTROS PCT % 74   LYMPHS PCT % 15   MONOS PCT % 9   EOS PCT % 2     Results from last 7 days   Lab Units 11/20/23  0405   SODIUM mmol/L 133*   POTASSIUM mmol/L 4.1   CHLORIDE mmol/L 103   CO2 mmol/L 22   BUN mg/dL 13   CREATININE mg/dL 0.99   ANION GAP mmol/L 8   CALCIUM mg/dL 9.5   ALBUMIN g/dL 3.6   TOTAL BILIRUBIN mg/dL 0.81   ALK PHOS U/L 70   ALT U/L 13   AST U/L 15   GLUCOSE RANDOM mg/dL 101     Results from last 7 days   Lab Units 11/19/23  0809   INR  1.17                   Lines/Drains:  Invasive Devices       Peripheral Intravenous Line  Duration             Peripheral IV 11/18/23 Left Antecubital 1 day                      Telemetry:  Telemetry Orders (From admission, onward)               24 Hour Telemetry Monitoring  Continuous x 24 Hours (Telem)        Question:  Reason for 24 Hour Telemetry  Answer:  Pulmonary Embolism                     Telemetry Reviewed: Normal Sinus Rhythm  Indication for Continued Telemetry Use: PE             Imaging: No pertinent imaging reviewed.     Recent Cultures (last 7 days):   Results from last 7 days   Lab Units 11/18/23  2154   URINE CULTURE  >100,000 cfu/ml Gram Negative Mak Enteric Like*       Last 24 Hours Medication List:   Current Facility-Administered Medications   Medication Dose Route Frequency Provider Last Rate    acetaminophen  650 mg Oral Q6H PRN Rebbecca Faulkner, DO      aztreonam  1,000 mg Intravenous Q8H Rebbecca Faulkner, DO 1,000 mg (11/20/23 0551)    benzonatate  100 mg Oral Q8H [address] Rebbecca Faulkner, DO      gabapentin  100 mg Oral HS Rebbecca Faulkner, DO      guaiFENesin  600 mg Oral Q12H [address] Rebbecca Faulkner, DO      heparin (porcine)  3-30 Units/kg/hr (Order-Specific) Intravenous Titrated Sam Gannon PA-C 14 Units/kg/hr (11/20/23 0747)    heparin (porcine)  2,400 Units Intravenous Q6H PRN Quirino Daley PA-C      heparin (porcine)  4,800 Units Intravenous Q6H PRN Peri Sahra JagdeepMackkeyla      mirtazapine  7.5 mg Oral HS Lazarus Corine, DO      pantoprazole  40 mg Oral Early Morning Lazarus Corine, DO      pravastatin  40 mg Oral Daily With Dinner Lazarus Corien, DO      terazosin  1 mg Oral HS Lazarus Corine, DO          Today, Patient Was Seen By: Shalonda Smith PA-C    **Please Note: This note may have been constructed using a voice recognition system. **

## 2023-11-20 NOTE — PLAN OF CARE
Problem: Potential for Falls  Goal: Patient will remain free of falls  Description: INTERVENTIONS:  - Educate patient/family on patient safety including physical limitations  - Instruct patient to call for assistance with activity   - Consult OT/PT to assist with strengthening/mobility   - Keep Call bell within reach  - Keep bed low and locked with side rails adjusted as appropriate  - Keep care items and personal belongings within reach  - Initiate and maintain comfort rounds  - Make Fall Risk Sign visible to staff  - Offer Toileting every 2 Hours, in advance of need  - Apply yellow socks and bracelet for high fall risk patients  - Consider moving patient to room near nurses station  Outcome: Progressing     Problem: PAIN - ADULT  Goal: Verbalizes/displays adequate comfort level or baseline comfort level  Description: Interventions:  - Encourage patient to monitor pain and request assistance  - Assess pain using appropriate pain scale  - Administer analgesics based on type and severity of pain and evaluate response  - Implement non-pharmacological measures as appropriate and evaluate response  - Consider cultural and social influences on pain and pain management  - Notify physician/advanced practitioner if interventions unsuccessful or patient reports new pain  Outcome: Progressing     Problem: INFECTION - ADULT  Goal: Absence or prevention of progression during hospitalization  Description: INTERVENTIONS:  - Assess and monitor for signs and symptoms of infection  - Monitor lab/diagnostic results  - Monitor all insertion sites, i.e. indwelling lines, tubes, and drains  - Monitor endotracheal if appropriate and nasal secretions for changes in amount and color  - Monitor appropriate cooling/warming therapies per order  - Administer medications as ordered  - Instruct and encourage patient and family to use good hand hygiene technique  - Identify and instruct in appropriate isolation precautions for identified infection/condition  Outcome: Progressing  Goal: Absence of fever/infection during neutropenic period  Description: INTERVENTIONS:  - Monitor WBC    Outcome: Progressing     Problem: SAFETY ADULT  Goal: Maintain or return to baseline ADL function  Description: INTERVENTIONS:  -  Assess patient's ability to carry out ADLs; assess patient's baseline for ADL function and identify physical deficits which impact ability to perform ADLs (bathing, care of mouth/teeth, toileting, grooming, dressing, etc.)  - Assess/evaluate cause of self-care deficits   - Assess range of motion  - Assess patient's mobility; develop plan if impaired  - Assess patient's need for assistive devices and provide as appropriate  - Encourage maximum independence but intervene and supervise when necessary  - Involve family in performance of ADLs  - Assess for home care needs following discharge   - Consider OT consult to assist with ADL evaluation and planning for discharge  - Provide patient education as appropriate  Outcome: Progressing  Goal: Maintains/Returns to pre admission functional level  Description: INTERVENTIONS:  - Perform AM-PAC 6 Click Basic Mobility/ Daily Activity assessment daily.  - Set and communicate daily mobility goal to care team and patient/family/caregiver. - Collaborate with rehabilitation services on mobility goals if consulted  - Perform Range of Motion 3 times a day. - Reposition patient every 2 hours.   - Dangle patient 3 times a day  - Stand patient 3 times a day  - Ambulate patient 3 times a day  - Out of bed to chair 3 times a day   - Out of bed for meals 3 times a day  - Out of bed for toileting  - Record patient progress and toleration of activity level   Outcome: Progressing     Problem: DISCHARGE PLANNING  Goal: Discharge to home or other facility with appropriate resources  Description: INTERVENTIONS:  - Identify barriers to discharge w/patient and caregiver  - Arrange for needed discharge resources and transportation as appropriate  - Identify discharge learning needs (meds, wound care, etc.)  - Arrange for interpretive services to assist at discharge as needed  - Refer to Case Management Department for coordinating discharge planning if the patient needs post-hospital services based on physician/advanced practitioner order or complex needs related to functional status, cognitive ability, or social support system  Outcome: Progressing     Problem: Knowledge Deficit  Goal: Patient/family/caregiver demonstrates understanding of disease process, treatment plan, medications, and discharge instructions  Description: Complete learning assessment and assess knowledge base. Interventions:  - Provide teaching at level of understanding  - Provide teaching via preferred learning methods  Outcome: Progressing     Problem: Nutrition/Hydration-ADULT  Goal: Nutrient/Hydration intake appropriate for improving, restoring or maintaining nutritional needs  Description: Monitor and assess patient's nutrition/hydration status for malnutrition. Collaborate with interdisciplinary team and initiate plan and interventions as ordered. Monitor patient's weight and dietary intake as ordered or per policy. Utilize nutrition screening tool and intervene as necessary. Determine patient's food preferences and provide high-protein, high-caloric foods as appropriate.      INTERVENTIONS:  - Monitor oral intake, urinary output, labs, and treatment plans  - Assess nutrition and hydration status and recommend course of action  - Evaluate amount of meals eaten  - Assist patient with eating if necessary   - Allow adequate time for meals  - Recommend/ encourage appropriate diets, oral nutritional supplements, and vitamin/mineral supplements  - Order, calculate, and assess calorie counts as needed  - Recommend, monitor, and adjust tube feedings and TPN/PPN based on assessed needs  - Assess need for intravenous fluids  - Provide specific nutrition/hydration education as appropriate  - Include patient/family/caregiver in decisions related to nutrition  Outcome: Progressing     Problem: CARDIOVASCULAR - ADULT  Goal: Maintains optimal cardiac output and hemodynamic stability  Description: INTERVENTIONS:  - Monitor I/O, vital signs and rhythm  - Monitor for S/S and trends of decreased cardiac output  - Administer and titrate ordered vasoactive medications to optimize hemodynamic stability  - Assess quality of pulses, skin color and temperature  - Assess for signs of decreased coronary artery perfusion  - Instruct patient to report change in severity of symptoms  Outcome: Progressing  Goal: Absence of cardiac dysrhythmias or at baseline rhythm  Description: INTERVENTIONS:  - Continuous cardiac monitoring, vital signs, obtain 12 lead EKG if ordered  - Administer antiarrhythmic and heart rate control medications as ordered  - Monitor electrolytes and administer replacement therapy as ordered  Outcome: Progressing     Problem: RESPIRATORY - ADULT  Goal: Achieves optimal ventilation and oxygenation  Description: INTERVENTIONS:  - Assess for changes in respiratory status  - Assess for changes in mentation and behavior  - Position to facilitate oxygenation and minimize respiratory effort  - Oxygen administered by appropriate delivery if ordered  - Initiate smoking cessation education as indicated  - Encourage broncho-pulmonary hygiene including cough, deep breathe, Incentive Spirometry  - Assess the need for suctioning and aspirate as needed  - Assess and instruct to report SOB or any respiratory difficulty  - Respiratory Therapy support as indicated  Outcome: Progressing

## 2023-11-21 LAB
ALBUMIN SERPL BCP-MCNC: 3.5 G/DL (ref 3.5–5)
ALP SERPL-CCNC: 73 U/L (ref 34–104)
ALT SERPL W P-5'-P-CCNC: 15 U/L (ref 7–52)
ANION GAP SERPL CALCULATED.3IONS-SCNC: 8 MMOL/L
APTT PPP: 62 SECONDS (ref 23–37)
AST SERPL W P-5'-P-CCNC: 16 U/L (ref 13–39)
BACTERIA UR CULT: ABNORMAL
BASOPHILS # BLD AUTO: 0.03 THOUSANDS/ÂΜL (ref 0–0.1)
BASOPHILS NFR BLD AUTO: 0 % (ref 0–1)
BILIRUB SERPL-MCNC: 0.55 MG/DL (ref 0.2–1)
BUN SERPL-MCNC: 15 MG/DL (ref 5–25)
CALCIUM SERPL-MCNC: 9.5 MG/DL (ref 8.4–10.2)
CHLORIDE SERPL-SCNC: 105 MMOL/L (ref 96–108)
CO2 SERPL-SCNC: 22 MMOL/L (ref 21–32)
CREAT SERPL-MCNC: 0.8 MG/DL (ref 0.6–1.3)
EOSINOPHIL # BLD AUTO: 0.19 THOUSAND/ÂΜL (ref 0–0.61)
EOSINOPHIL NFR BLD AUTO: 3 % (ref 0–6)
ERYTHROCYTE [DISTWIDTH] IN BLOOD BY AUTOMATED COUNT: 14.1 % (ref 11.6–15.1)
GFR SERPL CREATININE-BSD FRML MDRD: 73 ML/MIN/1.73SQ M
GLUCOSE SERPL-MCNC: 92 MG/DL (ref 65–140)
HCT VFR BLD AUTO: 35.7 % (ref 34.8–46.1)
HGB BLD-MCNC: 11.6 G/DL (ref 11.5–15.4)
IMM GRANULOCYTES # BLD AUTO: 0.02 THOUSAND/UL (ref 0–0.2)
IMM GRANULOCYTES NFR BLD AUTO: 0 % (ref 0–2)
LYMPHOCYTES # BLD AUTO: 0.95 THOUSANDS/ÂΜL (ref 0.6–4.47)
LYMPHOCYTES NFR BLD AUTO: 14 % (ref 14–44)
MAGNESIUM SERPL-MCNC: 2.1 MG/DL (ref 1.9–2.7)
MCH RBC QN AUTO: 26.2 PG (ref 26.8–34.3)
MCHC RBC AUTO-ENTMCNC: 32.5 G/DL (ref 31.4–37.4)
MCV RBC AUTO: 81 FL (ref 82–98)
MONOCYTES # BLD AUTO: 0.58 THOUSAND/ÂΜL (ref 0.17–1.22)
MONOCYTES NFR BLD AUTO: 8 % (ref 4–12)
NEUTROPHILS # BLD AUTO: 5.1 THOUSANDS/ÂΜL (ref 1.85–7.62)
NEUTS SEG NFR BLD AUTO: 75 % (ref 43–75)
NRBC BLD AUTO-RTO: 0 /100 WBCS
PLATELET # BLD AUTO: 273 THOUSANDS/UL (ref 149–390)
PMV BLD AUTO: 8.8 FL (ref 8.9–12.7)
POTASSIUM SERPL-SCNC: 3.9 MMOL/L (ref 3.5–5.3)
PROT SERPL-MCNC: 7.1 G/DL (ref 6.4–8.4)
RBC # BLD AUTO: 4.43 MILLION/UL (ref 3.81–5.12)
SODIUM SERPL-SCNC: 135 MMOL/L (ref 135–147)
WBC # BLD AUTO: 6.87 THOUSAND/UL (ref 4.31–10.16)

## 2023-11-21 PROCEDURE — 80053 COMPREHEN METABOLIC PANEL: CPT

## 2023-11-21 PROCEDURE — 99232 SBSQ HOSP IP/OBS MODERATE 35: CPT | Performed by: PHYSICIAN ASSISTANT

## 2023-11-21 PROCEDURE — 83735 ASSAY OF MAGNESIUM: CPT

## 2023-11-21 PROCEDURE — 85730 THROMBOPLASTIN TIME PARTIAL: CPT | Performed by: INTERNAL MEDICINE

## 2023-11-21 PROCEDURE — 85025 COMPLETE CBC W/AUTO DIFF WBC: CPT

## 2023-11-21 RX ORDER — CEFAZOLIN SODIUM 2 G/50ML
2000 SOLUTION INTRAVENOUS EVERY 8 HOURS
Status: DISCONTINUED | OUTPATIENT
Start: 2023-11-21 | End: 2023-11-22 | Stop reason: HOSPADM

## 2023-11-21 RX ADMIN — TERAZOSIN HYDROCHLORIDE 1 MG: 1 CAPSULE ORAL at 23:56

## 2023-11-21 RX ADMIN — HEPARIN SODIUM 14 UNITS/KG/HR: 10000 INJECTION, SOLUTION INTRAVENOUS at 09:57

## 2023-11-21 RX ADMIN — BENZONATATE 100 MG: 100 CAPSULE ORAL at 05:51

## 2023-11-21 RX ADMIN — PANTOPRAZOLE SODIUM 40 MG: 40 TABLET, DELAYED RELEASE ORAL at 05:52

## 2023-11-21 RX ADMIN — AZTREONAM 1000 MG: 1 INJECTION, POWDER, LYOPHILIZED, FOR SOLUTION INTRAMUSCULAR; INTRAVENOUS at 05:51

## 2023-11-21 RX ADMIN — CEFAZOLIN SODIUM 2000 MG: 2 SOLUTION INTRAVENOUS at 23:55

## 2023-11-21 RX ADMIN — PRAVASTATIN SODIUM 40 MG: 40 TABLET ORAL at 16:08

## 2023-11-21 RX ADMIN — GABAPENTIN 100 MG: 100 CAPSULE ORAL at 23:55

## 2023-11-21 RX ADMIN — BENZONATATE 100 MG: 100 CAPSULE ORAL at 23:55

## 2023-11-21 RX ADMIN — BENZONATATE 100 MG: 100 CAPSULE ORAL at 14:50

## 2023-11-21 RX ADMIN — GUAIFENESIN 600 MG: 600 TABLET, EXTENDED RELEASE ORAL at 09:47

## 2023-11-21 RX ADMIN — MIRTAZAPINE 7.5 MG: 15 TABLET, FILM COATED ORAL at 23:55

## 2023-11-21 RX ADMIN — GUAIFENESIN 600 MG: 600 TABLET, EXTENDED RELEASE ORAL at 23:55

## 2023-11-21 RX ADMIN — CEFAZOLIN SODIUM 2000 MG: 2 SOLUTION INTRAVENOUS at 14:49

## 2023-11-21 NOTE — UTILIZATION REVIEW
Brief additional clinical    11/21    Remains on  IV  heparin for  PE. Continue for another 24 hours. Urine culture growing  E coli, susceptible to IV  ancef, continue  and d/c  IV  aztreonam. Remains on tele. Continue symptomatic treatment for   COVID. Still short of breath with ambulation. Sats  today 90  % RA. Continue current meds/treatment plan.

## 2023-11-21 NOTE — PROGRESS NOTES
[facility]  Progress Note  Name: Valarie Younger  MRN: [id number]  Unit/Bed#: E5 -01 I Date of Admission: 11/18/2023   Date of Service: 11/21/2023 I Hospital Day: 2    Assessment/Plan   * Pulmonary embolism [facility])  Assessment & Plan  Presented with right upper back pain / right calf pain - as well as pleuritic chest pain over the last several days  COVID-positive back on November 7  CTA PE protocol with subsegmental pulmonary embolus at the medial right lobe as well as stable hilar and bilateral mediastinal adenopathy  On room air with normal O2 sats  BNP unremarkable   In addition to being COVID-positive, patient used estradiol vaginal cream for recurrent UTIs  Denies any recent long car trips or surgeries  Continue telemetry   Bilateral venous duplex to assess for DVT negative  Echocardiogram: EF 96%, grade 1 diastolic dysfunction, mild aortic regurgitation, aortic valve sclerosis, trace tricuspid regurgitation  Still with ongoing pleuritic chest pain and shortness of breath  Continue heparin drip for another 24 hrs   Price check Eliquis today and expect transition to oral anticoagulant tomorrow    Acute cystitis with hematuria  Assessment & Plan  UA nitrite positive, pyuria  Urine culture growing gram negative rods  History of multidrug-resistant UTI of Klebsiella as well as Morganella which were both sensitive to aztreonam  Received aztreonam x2 days  Urine culture now growing E. coli susceptible to cefazolin- De-escalate antibiotics    COVID  Assessment & Plan  COVID positive back on November 7; on room air; no infiltrate noted on CT imaging  Hold off of using dexamethasone or remdesivir for now  Continue symptomatic treatment         VTE Pharmacologic Prophylaxis:   Pharmacologic: Heparin Drip  Mechanical VTE Prophylaxis in Place: Yes    Discharge Plan: With need for continued inpatient stay for heparin drip. Anticipate transition to oral anticoagulant tomorrow.     Discussions with Specialists or Other Care Team Provider: Nursing,     Education and Discussions with Family / Patient: Patient, son at bedside-updated    Time Spent for Care: This time was spent on one or more of the following: performing physical exam; counseling and coordination of care; obtaining or reviewing history; documenting in the medical record; reviewing/ordering tests, medications, or procedures; communicating with other healthcare professionals and discussing with patient's family/caregivers. Current Length of Stay: 2 day(s)  Current Patient Status: Inpatient   Code Status: Level 1 - Full Code    Subjective:   Patient resting in bed. She complains of persistent right-sided flank/chest pain. Still with shortness of breath especially with any type of ambulation. Discussed current recommendations of anticoagulation as well as transition to oral anticoagulant tomorrow. Remains stable on room air despite symptoms. Tolerating antibiotics. Eating and drinking okay. Objective:     Vitals:   Temp (24hrs), Av.6 °F (37 °C), Min:98.4 °F (36.9 °C), Max:98.7 °F (37.1 °C)    Temp:  [98.4 °F (36.9 °C)-98.7 °F (37.1 °C)] 98.7 °F (37.1 °C)  HR:  [72-85] 72  Resp:  [17-18] 18  BP: (113-130)/(61-80) 113/67  SpO2:  [90 %-96 %] 90 %  Body mass index is 32.31 kg/m². Input and Output Summary (last 24 hours): Intake/Output Summary (Last 24 hours) at 2023 1407  Last data filed at 2023 0916  Gross per 24 hour   Intake 180 ml   Output --   Net 180 ml       Physical Exam:     Physical Exam  Vitals and nursing note reviewed. Constitutional:       General: She is not in acute distress. Appearance: Normal appearance. She is normal weight. She is not ill-appearing, toxic-appearing or diaphoretic. HENT:      Head: Normocephalic and atraumatic. Eyes:      General: No scleral icterus. Cardiovascular:      Rate and Rhythm: Normal rate and regular rhythm.    Pulmonary:      Effort: Pulmonary effort is normal. No respiratory distress. Breath sounds: Normal breath sounds. No stridor. No wheezing or rhonchi. Abdominal:      General: Bowel sounds are normal. There is no distension. Palpations: Abdomen is soft. There is no mass. Tenderness: There is no abdominal tenderness. Hernia: No hernia is present. Musculoskeletal:         General: No swelling. Cervical back: Neck supple. Skin:     General: Skin is warm and dry. Neurological:      Mental Status: She is oriented to person, place, and time. Mental status is at baseline. Psychiatric:         Mood and Affect: Mood normal.         Behavior: Behavior normal.         Additional Data:     Labs:    Results from last 7 days   Lab Units 11/21/23  0436   WBC Thousand/uL 6.87   HEMOGLOBIN g/dL 11.6   HEMATOCRIT % 35.7   PLATELETS Thousands/uL 273   NEUTROS PCT % 75   LYMPHS PCT % 14   MONOS PCT % 8   EOS PCT % 3     Results from last 7 days   Lab Units 11/21/23  0436   POTASSIUM mmol/L 3.9   CHLORIDE mmol/L 105   CO2 mmol/L 22   BUN mg/dL 15   CREATININE mg/dL 0.80   CALCIUM mg/dL 9.5   ALK PHOS U/L 73   ALT U/L 15   AST U/L 16     Results from last 7 days   Lab Units 11/19/23  0809   INR  1.17       * I Have Reviewed All Lab Data Listed Above. * Additional Pertinent Lab Tests Reviewed:  [facility] Admission Reviewed    Imaging:    Imaging Reports Reviewed Today Include:   Imaging Personally Reviewed by Myself Includes:      Recent Cultures (last 7 days):     Results from last 7 days   Lab Units 11/18/23  2154   URINE CULTURE  >100,000 cfu/ml Escherichia coli*       Lines/Drains:  Invasive Devices       Peripheral Intravenous Line  Duration             Peripheral IV 11/18/23 Left Antecubital 2 days                    Last 24 Hours Medication List:   Current Facility-Administered Medications   Medication Dose Route Frequency Provider Last Rate    acetaminophen  650 mg Oral Q6H PRN Marija Ramirez DO benzonatate  100 mg Oral Q8H [facility], DO      cefazolin  2,000 mg Intravenous Q8H Isha Suggs PA-C      gabapentin  100 mg Oral HS [facility], DO      guaiFENesin  600 mg Oral Q12H [facility], DO      heparin (porcine)  3-30 Units/kg/hr (Order-Specific) Intravenous Titrated Celeste Nicholson 14 Units/kg/hr (11/21/23 0957)    heparin (porcine)  2,400 Units Intravenous Q6H PRN Jan Jackson PA-C      heparin (porcine)  4,800 Units Intravenous Q6H PRN Jan Jackson PA-C      mirtazapine  7.5 mg Oral HS [facility], DO      pantoprazole  40 mg Oral Early Morning [facility], DO      pravastatin  40 mg Oral Daily With Dinner [facility], DO      terazosin  1 mg Oral HS [facility], DO          Today, Patient Was Seen By: Wilner Khan PA-C    ** Please Note: This note has been constructed using a voice recognition system.  **

## 2023-11-21 NOTE — ASSESSMENT & PLAN NOTE
UA nitrite positive, pyuria  Urine culture growing gram negative rods  History of multidrug-resistant UTI of Klebsiella as well as Morganella which were both sensitive to aztreonam  Received aztreonam x2 days  Urine culture now growing E. coli susceptible to cefazolin- De-escalate antibiotics

## 2023-11-21 NOTE — PLAN OF CARE
Problem: Potential for Falls  Goal: Patient will remain free of falls  Description: INTERVENTIONS:  - Educate patient/family on patient safety including physical limitations  - Instruct patient to call for assistance with activity   - Consult OT/PT to assist with strengthening/mobility   - Keep Call bell within reach  - Keep bed low and locked with side rails adjusted as appropriate  - Keep care items and personal belongings within reach  - Initiate and maintain comfort rounds  - Make Fall Risk Sign visible to staff  -- Apply yellow socks and bracelet for high fall risk patients  - Consider moving patient to room near nurses station  Outcome: Progressing     Problem: PAIN - ADULT  Goal: Verbalizes/displays adequate comfort level or baseline comfort level  Description: Interventions:  - Encourage patient to monitor pain and request assistance  - Assess pain using appropriate pain scale  - Administer analgesics based on type and severity of pain and evaluate response  - Implement non-pharmacological measures as appropriate and evaluate response  - Consider cultural and social influences on pain and pain management  - Notify physician/advanced practitioner if interventions unsuccessful or patient reports new pain  Outcome: Progressing     Problem: INFECTION - ADULT  Goal: Absence or prevention of progression during hospitalization  Description: INTERVENTIONS:  - Assess and monitor for signs and symptoms of infection  - Monitor lab/diagnostic results  - Monitor all insertion sites, i.e. indwelling lines, tubes, and drains  - Monitor endotracheal if appropriate and nasal secretions for changes in amount and color  - [place] appropriate cooling/warming therapies per order  - Administer medications as ordered  - Instruct and encourage patient and family to use good hand hygiene technique  - Identify and instruct in appropriate isolation precautions for identified infection/condition  Outcome: Progressing  Goal: Absence of fever/infection during neutropenic period  Description: INTERVENTIONS:  - Monitor WBC    Outcome: Progressing     Problem: SAFETY ADULT  Goal: Maintain or return to baseline ADL function  Description: INTERVENTIONS:  -  Assess patient's ability to carry out ADLs; assess patient's baseline for ADL function and identify physical deficits which impact ability to perform ADLs (bathing, care of mouth/teeth, toileting, grooming, dressing, etc.)  - Assess/evaluate cause of self-care deficits   - Assess range of motion  - Assess patient's mobility; develop plan if impaired  - Assess patient's need for assistive devices and provide as appropriate  - Encourage maximum independence but intervene and supervise when necessary  - Involve family in performance of ADLs  - Assess for home care needs following discharge   - Consider OT consult to assist with ADL evaluation and planning for discharge  - Provide patient education as appropriate  Outcome: Progressing  Goal: Maintains/Returns to pre admission functional level  Description: INTERVENTIONS:  - Perform AM-PAC 6 Click Basic Mobility/ Daily Activity assessment daily.  - Set and communicate daily mobility goal to care team and patient/family/caregiver.    - Collaborate with rehabilitation services on mobility goals if consulted  - - Out of bed for toileting  - Record patient progress and toleration of activity level   Outcome: Progressing     Problem: DISCHARGE PLANNING  Goal: Discharge to home or other facility with appropriate resources  Description: INTERVENTIONS:  - Identify barriers to discharge w/patient and caregiver  - Arrange for needed discharge resources and transportation as appropriate  - Identify discharge learning needs (meds, wound care, etc.)  - Arrange for interpretive services to assist at discharge as needed  - Refer to Case Management Department for coordinating discharge planning if the patient needs post-hospital services based on physician/advanced practitioner order or complex needs related to functional status, cognitive ability, or social support system  Outcome: Progressing     Problem: Knowledge Deficit  Goal: Patient/family/caregiver demonstrates understanding of disease process, treatment plan, medications, and discharge instructions  Description: Complete learning assessment and assess knowledge base. Interventions:  - Provide teaching at level of understanding  - Provide teaching via preferred learning methods  Outcome: Progressing     Problem: Nutrition/Hydration-ADULT  Goal: Nutrient/Hydration intake appropriate for improving, restoring or maintaining nutritional needs  Description: Monitor and assess patient's nutrition/hydration status for malnutrition. Collaborate with interdisciplinary team and initiate plan and interventions as ordered. Monitor patient's weight and dietary intake as ordered or per policy. Utilize nutrition screening tool and intervene as necessary. Determine patient's food preferences and provide high-protein, high-caloric foods as appropriate.      INTERVENTIONS:  - Monitor oral intake, urinary output, labs, and treatment plans  - Assess nutrition and hydration status and recommend course of action  - Evaluate amount of meals eaten  - Assist patient with eating if necessary   - Allow adequate time for meals  - Recommend/ encourage appropriate diets, oral nutritional supplements, and vitamin/mineral supplements  - Order, calculate, and assess calorie counts as needed  - Recommend, monitor, and adjust tube feedings and TPN/PPN based on assessed needs  - Assess need for intravenous fluids  - Provide specific nutrition/hydration education as appropriate  - Include patient/family/caregiver in decisions related to nutrition  Outcome: Progressing     Problem: CARDIOVASCULAR - ADULT  Goal: Maintains optimal cardiac output and hemodynamic stability  Description: INTERVENTIONS:  - Monitor I/O, vital signs and rhythm  - Monitor for S/S and trends of decreased cardiac output  - Administer and titrate ordered vasoactive medications to optimize hemodynamic stability  - Assess quality of pulses, skin color and temperature  - Assess for signs of decreased coronary artery perfusion  - Instruct patient to report change in severity of symptoms  Outcome: Progressing  Goal: Absence of cardiac dysrhythmias or at baseline rhythm  Description: INTERVENTIONS:  - Continuous cardiac monitoring, vital signs, obtain 12 lead EKG if ordered  - Administer antiarrhythmic and heart rate control medications as ordered  - Monitor electrolytes and administer replacement therapy as ordered  Outcome: Progressing     Problem: RESPIRATORY - ADULT  Goal: Achieves optimal ventilation and oxygenation  Description: INTERVENTIONS:  - Assess for changes in respiratory status  - Assess for changes in mentation and behavior  - Position to facilitate oxygenation and minimize respiratory effort  - Oxygen administered by appropriate delivery if ordered  - Initiate smoking cessation education as indicated  - Encourage broncho-pulmonary hygiene including cough, deep breathe, Incentive Spirometry  - Assess the need for suctioning and aspirate as needed  - Assess and instruct to report SOB or any respiratory difficulty  - Respiratory Therapy support as indicated  Outcome: Progressing

## 2023-11-21 NOTE — ASSESSMENT & PLAN NOTE
Presented with right upper back pain / right calf pain - as well as pleuritic chest pain over the last several days  COVID-positive back on November 7  CTA PE protocol with subsegmental pulmonary embolus at the medial right lobe as well as stable hilar and bilateral mediastinal adenopathy  On room air with normal O2 sats  BNP unremarkable   In addition to being COVID-positive, patient used estradiol vaginal cream for recurrent UTIs  Denies any recent long car trips or surgeries  Continue telemetry   Bilateral venous duplex to assess for DVT negative  Echocardiogram: EF 44%, grade 1 diastolic dysfunction, mild aortic regurgitation, aortic valve sclerosis, trace tricuspid regurgitation  Still with ongoing pleuritic chest pain and shortness of breath  Continue heparin drip for another 24 hrs   Price check Eliquis today and expect transition to oral anticoagulant tomorrow

## 2023-11-21 NOTE — CASE MANAGEMENT
Case Management Discharge Planning Note    Patient name Read Arpan  Location [address] [facility] 5 [address] [facility] [address]-* MRN [id number]  : 1950 Date 2023       Current Admission Date: 2023  Current Admission Diagnosis:Pulmonary embolism [facility])   Patient Active Problem List    Diagnosis Date Noted    Acute cystitis with hematuria 2023    Pulmonary embolism ([address]) 2023    COVID 2023    SOB (shortness of breath) on exertion 2023    Left arm pain 2023    Mixed hyperlipidemia 2022    Sleep disturbance 2022    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 2022    Other hydronephrosis 2021    Incomplete bladder emptying 2021    Pelvic floor dysfunction 2021    OAB (overactive bladder) 09/15/2020    Bilirubin in urine 2020    Proteinuria 2020    Hilar adenopathy 01/15/2020    Vitamin D deficiency 2018    Age-related osteoporosis without current pathological fracture 2018    Incidental lung nodule, > 3mm and < 8mm 2018    Recurrent UTI 2018    Gastroesophageal reflux disease without esophagitis 2018    Class 1 obesity in adult 2018    Alopecia areata 2018      LOS (days): 2  Geometric Mean LOS (GMLOS) (days): 4.00  Days to GMLOS:1.4     OBJECTIVE:  Risk of Unplanned Readmission Score: 10.58         Current admission status: Inpatient   Preferred Pharmacy:   [address], [address] [place] [address]  Phone: [phone] Fax: [address] [place] [address],  [address] [place] [address],  [address] [place] [address]  Phone: [phone] Fax: [phone]    Primary Care Provider: WAI Florez    [facility] [employer] [facility] [facility]  Secondary Insurance:     DISCHARGE DETAILS:                     Additional Comments: CM spoke with [facility] pharmacy, Eliquis is $47 per month after insurance.  Prosper Age Mauricio Suggs notified.

## 2023-11-22 ENCOUNTER — TRANSITIONAL CARE MANAGEMENT (OUTPATIENT)
Dept: FAMILY MEDICINE CLINIC | Facility: CLINIC | Age: 73
End: 2023-11-22

## 2023-11-22 VITALS
RESPIRATION RATE: 18 BRPM | HEART RATE: 61 BPM | HEIGHT: 55 IN | WEIGHT: 139 LBS | TEMPERATURE: 97.7 F | SYSTOLIC BLOOD PRESSURE: 110 MMHG | BODY MASS INDEX: 32.17 KG/M2 | DIASTOLIC BLOOD PRESSURE: 56 MMHG | OXYGEN SATURATION: 94 %

## 2023-11-22 PROBLEM — A41.9 SEPSIS (HCC): Status: ACTIVE | Noted: 2023-11-22

## 2023-11-22 LAB — APTT PPP: 82 SECONDS (ref 23–37)

## 2023-11-22 PROCEDURE — 99239 HOSP IP/OBS DSCHRG MGMT >30: CPT | Performed by: PHYSICIAN ASSISTANT

## 2023-11-22 PROCEDURE — 85730 THROMBOPLASTIN TIME PARTIAL: CPT | Performed by: INTERNAL MEDICINE

## 2023-11-22 RX ORDER — CEPHALEXIN 500 MG/1
500 CAPSULE ORAL EVERY 6 HOURS SCHEDULED
Qty: 8 CAPSULE | Refills: 0 | Status: SHIPPED | OUTPATIENT
Start: 2023-11-22 | End: 2023-11-24

## 2023-11-22 RX ADMIN — CEFAZOLIN SODIUM 2000 MG: 2 SOLUTION INTRAVENOUS at 06:56

## 2023-11-22 RX ADMIN — CEFAZOLIN SODIUM 2000 MG: 2 SOLUTION INTRAVENOUS at 13:00

## 2023-11-22 RX ADMIN — ACETAMINOPHEN 325MG 650 MG: 325 TABLET ORAL at 00:00

## 2023-11-22 RX ADMIN — BENZONATATE 100 MG: 100 CAPSULE ORAL at 06:56

## 2023-11-22 RX ADMIN — PANTOPRAZOLE SODIUM 40 MG: 40 TABLET, DELAYED RELEASE ORAL at 06:56

## 2023-11-22 RX ADMIN — BENZONATATE 100 MG: 100 CAPSULE ORAL at 13:00

## 2023-11-22 RX ADMIN — APIXABAN 10 MG: 5 TABLET, FILM COATED ORAL at 13:00

## 2023-11-22 RX ADMIN — GUAIFENESIN 600 MG: 600 TABLET, EXTENDED RELEASE ORAL at 08:23

## 2023-11-22 NOTE — ASSESSMENT & PLAN NOTE
COVID positive back on November 7; on room air; no infiltrate noted on CT imaging  Outside treatment window and appearing stable  Hold off of using dexamethasone or remdesivir  Continue symptomatic treatment

## 2023-11-22 NOTE — PLAN OF CARE
Problem: Potential for Falls  Goal: Patient will remain free of falls  Description: INTERVENTIONS:  - Educate patient/family on patient safety including physical limitations  - Instruct patient to call for assistance with activity   - Consult OT/PT to assist with strengthening/mobility   - Keep Call bell within reach  - Keep bed low and locked with side rails adjusted as appropriate  - Keep care items and personal belongings within reach  - Initiate and maintain comfort rounds  - Make Fall Risk Sign visible to staff  - Apply yellow socks and bracelet for high fall risk patients  - Consider moving patient to room near nurses station  Outcome: Progressing     Problem: PAIN - ADULT  Goal: Verbalizes/displays adequate comfort level or baseline comfort level  Description: Interventions:  - Encourage patient to monitor pain and request assistance  - Assess pain using appropriate pain scale  - Administer analgesics based on type and severity of pain and evaluate response  - Implement non-pharmacological measures as appropriate and evaluate response  - Consider cultural and social influences on pain and pain management  - Notify physician/advanced practitioner if interventions unsuccessful or patient reports new pain  Outcome: Progressing     Problem: INFECTION - ADULT  Goal: Absence or prevention of progression during hospitalization  Description: INTERVENTIONS:  - Assess and monitor for signs and symptoms of infection  - Monitor lab/diagnostic results  - Monitor all insertion sites, i.e. indwelling lines, tubes, and drains  - Monitor endotracheal if appropriate and nasal secretions for changes in amount and color  - [place] appropriate cooling/warming therapies per order  - Administer medications as ordered  - Instruct and encourage patient and family to use good hand hygiene technique  - Identify and instruct in appropriate isolation precautions for identified infection/condition  Outcome: Progressing  Goal: Absence of fever/infection during neutropenic period  Description: INTERVENTIONS:  - Monitor WBC    Outcome: Progressing     Problem: SAFETY ADULT  Goal: Maintain or return to baseline ADL function  Description: INTERVENTIONS:  -  Assess patient's ability to carry out ADLs; assess patient's baseline for ADL function and identify physical deficits which impact ability to perform ADLs (bathing, care of mouth/teeth, toileting, grooming, dressing, etc.)  - Assess/evaluate cause of self-care deficits   - Assess range of motion  - Assess patient's mobility; develop plan if impaired  - Assess patient's need for assistive devices and provide as appropriate  - Encourage maximum independence but intervene and supervise when necessary  - Involve family in performance of ADLs  - Assess for home care needs following discharge   - Consider OT consult to assist with ADL evaluation and planning for discharge  - Provide patient education as appropriate  Outcome: Progressing  Goal: Maintains/Returns to pre admission functional level  Description: INTERVENTIONS:  - Perform AM-PAC 6 Click Basic Mobility/ Daily Activity assessment daily.  - Set and communicate daily mobility goal to care team and patient/family/caregiver. - Collaborate with rehabilitation services on mobility goals if consulted  - Perform Range of Motion 3 times a day. - Reposition patient every 2 hours.   - Dangle patient 3 times a day  - Stand patient 3 times a day  - Ambulate patient 3 times a day  - Out of bed to chair 3 times a day   - Out of bed for meals 3 times a day  - Out of bed for toileting  - Record patient progress and toleration of activity level   Outcome: Progressing     Problem: DISCHARGE PLANNING  Goal: Discharge to home or other facility with appropriate resources  Description: INTERVENTIONS:  - Identify barriers to discharge w/patient and caregiver  - Arrange for needed discharge resources and transportation as appropriate  - Identify discharge learning needs (meds, wound care, etc.)  - Arrange for interpretive services to assist at discharge as needed  - Refer to Case Management Department for coordinating discharge planning if the patient needs post-hospital services based on physician/advanced practitioner order or complex needs related to functional status, cognitive ability, or social support system  Outcome: Progressing     Problem: Knowledge Deficit  Goal: Patient/family/caregiver demonstrates understanding of disease process, treatment plan, medications, and discharge instructions  Description: Complete learning assessment and assess knowledge base. Interventions:  - Provide teaching at level of understanding  - Provide teaching via preferred learning methods  Outcome: Progressing     Problem: Nutrition/Hydration-ADULT  Goal: Nutrient/Hydration intake appropriate for improving, restoring or maintaining nutritional needs  Description: Monitor and assess patient's nutrition/hydration status for malnutrition. Collaborate with interdisciplinary team and initiate plan and interventions as ordered. Monitor patient's weight and dietary intake as ordered or per policy. Utilize nutrition screening tool and intervene as necessary. Determine patient's food preferences and provide high-protein, high-caloric foods as appropriate.      INTERVENTIONS:  - Monitor oral intake, urinary output, labs, and treatment plans  - Assess nutrition and hydration status and recommend course of action  - Evaluate amount of meals eaten  - Assist patient with eating if necessary   - Allow adequate time for meals  - Recommend/ encourage appropriate diets, oral nutritional supplements, and vitamin/mineral supplements  - Order, calculate, and assess calorie counts as needed  - Recommend, monitor, and adjust tube feedings and TPN/PPN based on assessed needs  - Assess need for intravenous fluids  - Provide specific nutrition/hydration education as appropriate  - Include patient/family/caregiver in decisions related to nutrition  Outcome: Progressing     Problem: CARDIOVASCULAR - ADULT  Goal: Maintains optimal cardiac output and hemodynamic stability  Description: INTERVENTIONS:  - Monitor I/O, vital signs and rhythm  - Monitor for S/S and trends of decreased cardiac output  - Administer and titrate ordered vasoactive medications to optimize hemodynamic stability  - Assess quality of pulses, skin color and temperature  - Assess for signs of decreased coronary artery perfusion  - Instruct patient to report change in severity of symptoms  Outcome: Progressing  Goal: Absence of cardiac dysrhythmias or at baseline rhythm  Description: INTERVENTIONS:  - Continuous cardiac monitoring, vital signs, obtain 12 lead EKG if ordered  - Administer antiarrhythmic and heart rate control medications as ordered  - Monitor electrolytes and administer replacement therapy as ordered  Outcome: Progressing     Problem: RESPIRATORY - ADULT  Goal: Achieves optimal ventilation and oxygenation  Description: INTERVENTIONS:  - Assess for changes in respiratory status  - Assess for changes in mentation and behavior  - Position to facilitate oxygenation and minimize respiratory effort  - Oxygen administered by appropriate delivery if ordered  - Initiate smoking cessation education as indicated  - Encourage broncho-pulmonary hygiene including cough, deep breathe, Incentive Spirometry  - Assess the need for suctioning and aspirate as needed  - Assess and instruct to report SOB or any respiratory difficulty  - Respiratory Therapy support as indicated  Outcome: Progressing

## 2023-11-22 NOTE — PLAN OF CARE
Problem: Potential for Falls  Goal: Patient will remain free of falls  Description: INTERVENTIONS:  - Educate patient/family on patient safety including physical limitations  - Instruct patient to call for assistance with activity   - Consult OT/PT to assist with strengthening/mobility   - Keep Call bell within reach  - Keep bed low and locked with side rails adjusted as appropriate  - Keep care items and personal belongings within reach  - Initiate and maintain comfort rounds  - Make Fall Risk Sign visible to staff  - Apply yellow socks and bracelet for high fall risk patients  - Consider moving patient to room near nurses station  11/22/2023 1327 by Jean Carlos Shepard RN  Outcome: Adequate for Discharge  11/22/2023 0738 by Jean Carlos Shepard RN  Outcome: Progressing     Problem: PAIN - ADULT  Goal: Verbalizes/displays adequate comfort level or baseline comfort level  Description: Interventions:  - Encourage patient to monitor pain and request assistance  - Assess pain using appropriate pain scale  - Administer analgesics based on type and severity of pain and evaluate response  - Implement non-pharmacological measures as appropriate and evaluate response  - Consider cultural and social influences on pain and pain management  - Notify physician/advanced practitioner if interventions unsuccessful or patient reports new pain  11/22/2023 1327 by Jean Carlos Shepard RN  Outcome: Adequate for Discharge  11/22/2023 0738 by Jean Carlos Shepard RN  Outcome: Progressing     Problem: INFECTION - ADULT  Goal: Absence or prevention of progression during hospitalization  Description: INTERVENTIONS:  - Assess and monitor for signs and symptoms of infection  - Monitor lab/diagnostic results  - Monitor all insertion sites, i.e. indwelling lines, tubes, and drains  - Monitor endotracheal if appropriate and nasal secretions for changes in amount and color  - [place] appropriate cooling/warming therapies per order  - Administer medications as ordered  - Instruct and encourage patient and family to use good hand hygiene technique  - Identify and instruct in appropriate isolation precautions for identified infection/condition  11/22/2023 1327 by Cristal Wood RN  Outcome: Adequate for Discharge  11/22/2023 0738 by Cristal Wood RN  Outcome: Progressing  Goal: Absence of fever/infection during neutropenic period  Description: INTERVENTIONS:  - Monitor WBC    11/22/2023 1327 by Cristal Wood RN  Outcome: Adequate for Discharge  11/22/2023 0738 by Cristal Wood RN  Outcome: Progressing     Problem: SAFETY ADULT  Goal: Maintain or return to baseline ADL function  Description: INTERVENTIONS:  -  Assess patient's ability to carry out ADLs; assess patient's baseline for ADL function and identify physical deficits which impact ability to perform ADLs (bathing, care of mouth/teeth, toileting, grooming, dressing, etc.)  - Assess/evaluate cause of self-care deficits   - Assess range of motion  - Assess patient's mobility; develop plan if impaired  - Assess patient's need for assistive devices and provide as appropriate  - Encourage maximum independence but intervene and supervise when necessary  - Involve family in performance of ADLs  - Assess for home care needs following discharge   - Consider OT consult to assist with ADL evaluation and planning for discharge  - Provide patient education as appropriate  11/22/2023 1327 by Cristal Wood RN  Outcome: Adequate for Discharge  11/22/2023 0738 by Cristal Wood RN  Outcome: Progressing  Goal: Maintains/Returns to pre admission functional level  Description: INTERVENTIONS:  - Perform AM-PAC 6 Click Basic Mobility/ Daily Activity assessment daily.  - Set and communicate daily mobility goal to care team and patient/family/caregiver. - Collaborate with rehabilitation services on mobility goals if consulted  - Perform Range of Motion 3 times a day.   - Reposition patient every 3 hours.  - Dangle patient 3 times a day  - Stand patient 3 times a day  - Ambulate patient 3 times a day  - Out of bed to chair 3 times a day   - Out of bed for meals 3 times a day  - Out of bed for toileting  - Record patient progress and toleration of activity level   11/22/2023 1327 by Ledon Sever, RN  Outcome: Adequate for Discharge  11/22/2023 0738 by Ledon Sever, RN  Outcome: Progressing     Problem: DISCHARGE PLANNING  Goal: Discharge to home or other facility with appropriate resources  Description: INTERVENTIONS:  - Identify barriers to discharge w/patient and caregiver  - Arrange for needed discharge resources and transportation as appropriate  - Identify discharge learning needs (meds, wound care, etc.)  - Arrange for interpretive services to assist at discharge as needed  - Refer to Case Management Department for coordinating discharge planning if the patient needs post-hospital services based on physician/advanced practitioner order or complex needs related to functional status, cognitive ability, or social support system  11/22/2023 1327 by Ledon Sever, RN  Outcome: Adequate for Discharge  11/22/2023 0738 by Ledon Sever, RN  Outcome: Progressing     Problem: Knowledge Deficit  Goal: Patient/family/caregiver demonstrates understanding of disease process, treatment plan, medications, and discharge instructions  Description: Complete learning assessment and assess knowledge base. Interventions:  - Provide teaching at level of understanding  - Provide teaching via preferred learning methods  11/22/2023 1327 by Ledon Sever, RN  Outcome: Adequate for Discharge  11/22/2023 0738 by Ledon Sever, RN  Outcome: Progressing     Problem: Nutrition/Hydration-ADULT  Goal: Nutrient/Hydration intake appropriate for improving, restoring or maintaining nutritional needs  Description: Monitor and assess patient's nutrition/hydration status for malnutrition.  Collaborate with interdisciplinary team and initiate plan and interventions as ordered. Monitor patient's weight and dietary intake as ordered or per policy. Utilize nutrition screening tool and intervene as necessary. Determine patient's food preferences and provide high-protein, high-caloric foods as appropriate.      INTERVENTIONS:  - Monitor oral intake, urinary output, labs, and treatment plans  - Assess nutrition and hydration status and recommend course of action  - Evaluate amount of meals eaten  - Assist patient with eating if necessary   - Allow adequate time for meals  - Recommend/ encourage appropriate diets, oral nutritional supplements, and vitamin/mineral supplements  - Order, calculate, and assess calorie counts as needed  - Recommend, monitor, and adjust tube feedings and TPN/PPN based on assessed needs  - Assess need for intravenous fluids  - Provide specific nutrition/hydration education as appropriate  - Include patient/family/caregiver in decisions related to nutrition  11/22/2023 1327 by Ledon Sever, RN  Outcome: Adequate for Discharge  11/22/2023 0738 by Ledon Sever, RN  Outcome: Progressing     Problem: CARDIOVASCULAR - ADULT  Goal: Maintains optimal cardiac output and hemodynamic stability  Description: INTERVENTIONS:  - Monitor I/O, vital signs and rhythm  - Monitor for S/S and trends of decreased cardiac output  - Administer and titrate ordered vasoactive medications to optimize hemodynamic stability  - Assess quality of pulses, skin color and temperature  - Assess for signs of decreased coronary artery perfusion  - Instruct patient to report change in severity of symptoms  11/22/2023 1327 by Ledon Sever, RN  Outcome: Adequate for Discharge  11/22/2023 0738 by Ledon Sever, RN  Outcome: Progressing  Goal: Absence of cardiac dysrhythmias or at baseline rhythm  Description: INTERVENTIONS:  - Continuous cardiac monitoring, vital signs, obtain 12 lead EKG if ordered  - Administer antiarrhythmic and heart rate control medications as ordered  - Monitor electrolytes and administer replacement therapy as ordered  11/22/2023 1327 by Sultana Bryan RN  Outcome: Adequate for Discharge  11/22/2023 7498 by Sultana Bryan RN  Outcome: Progressing     Problem: RESPIRATORY - ADULT  Goal: Achieves optimal ventilation and oxygenation  Description: INTERVENTIONS:  - Assess for changes in respiratory status  - Assess for changes in mentation and behavior  - Position to facilitate oxygenation and minimize respiratory effort  - Oxygen administered by appropriate delivery if ordered  - Initiate smoking cessation education as indicated  - Encourage broncho-pulmonary hygiene including cough, deep breathe, Incentive Spirometry  - Assess the need for suctioning and aspirate as needed  - Assess and instruct to report SOB or any respiratory difficulty  - Respiratory Therapy support as indicated  11/22/2023 1327 by Sultana Bryan RN  Outcome: Adequate for Discharge  11/22/2023 0738 by Sultana Bryan RN  Outcome: Progressing

## 2023-11-22 NOTE — DISCHARGE SUMMARY
[address] [facility]  Discharge- Pranay Rumf 1950, 68 y.o. female MRN: [id number]  Unit/Bed#: E5 -01 Encounter: [id number]  Primary Care Provider: Alvin Phelps   Date and time admitted to hospital: 11/18/2023  7:00 PM    * Pulmonary embolism [facility])  Assessment & Plan  Presented with right upper back pain / right calf pain - as well as pleuritic chest pain over the last several days  Tested positive for COVID on 11/7/2023  CTA PE protocol: subsegmental pulmonary embolus at medial right lobe, stable hilar and bilateral mediastinal adenopathy  On room air with normal O2 sats  BNP unremarkable   In addition to being COVID-positive, patient used estradiol vaginal cream for recurrent UTIs  Denies any recent long car trips or surgeries  Bilateral venous duplex to assess for DVT -negative  Echocardiogram: EF 42%, grade 1 diastolic dysfunction, mild aortic regurgitation, aortic valve sclerosis, trace tricuspid regurg  S/p heparin drip --transition to oral Eliquis loading dose     Acute cystitis with hematuria  Assessment & Plan  UA nitrite positive, pyuria  Urine culture growing gram negative rods  History of multidrug-resistant UTI of Klebsiella as well as Morganella which were both sensitive to aztreonam  Received aztreonam x2 days  Urine culture now growing E. coli susceptible to cefazolin- De-escalate antibiotics    Sepsis ([address])  Assessment & Plan  As evidenced tachycardia, leukocytosis   Source - UTI   Treated with IV antibiotics  Transition to orals to complete course for UTI    COVID  Assessment & Plan  COVID positive back on November 7; on room air; no infiltrate noted on CT imaging  Outside treatment window and appearing stable  Hold off of using dexamethasone or remdesivir  Continue symptomatic treatment        Consultations During Hospital Stay:  None    Procedures Performed:   CTA ED chest PE Study  Result Date: 11/18/2023  Impression: Subsegmental pulmonary embolus at the medial right lower lobe Stable hilar and bilateral mediastinal adenopathy. Findings discussed with Dr. Krunal Rosario at 11:02 p.m., 11/18/2023 Workstation performed: [id number]      Significant Findings / Test Results:   Acute pulmonary embolism  Acute cystitis  Sepsis    Incidental Findings:   Covid     Test Results Pending at Discharge (will require follow up): None     Outpatient Followup Requested:  PCP- 1 week    Complications:  none     Hospital Course: Trevin Panchal is a 68 y.o. female patient with a PMH of CVA, recurrent UTI, osteoporosis, insomnia, GERD, obesity, hyperlipidemia. She originally presented to the hospital on 11/18/2023 due to right upper sided back pain as well as pleuritic chest pain for the past few days. Patient underwent workup and was found to have acute PE on CTA of chest.  Her venous duplex was negative. She was started on heparin drip. Her oxygen remained stable and she did not require supplemental oxygen here. She was also noted to have been COVID positive dating back to 11/7/23 - she did not require covid treatment as she was outside the treatment window. She was also found to to be septic and source was secondary to acute urinary tract infection. Urine analysis obtained on arrival indicating acute infection. Urine culture ultimately growing gram negative rods consistent with E. coli susceptible to cefazolin. Initially started on IV aztreonam according to previous cultures however antibiotics were de-escalated to cefazolin once urine culture resulted. Patient was transitioned to oral Eliquis with loading dose. She will also be transitioned to oral Keflex to complete antibiotic course of 7 days. Patient has been ambulating in the room with nursing, O2 saturation does not drop below 90s with ambulation. She is independent with no needs, lives at home with family, and son will provide transportation home. In conclusion, patient is stable for discharge at this time.     Condition at Discharge:  stable    Discharge Day Visit / Exam:     Subjective: In bathroom upon arrival.  Patient reports her breathing is slowly improving. Discussed with son at bedside. Will transition to oral Eliquis today with a loading dose. Discussed importance of outpatient follow-up. Vitals: Blood Pressure: 110/56 (11/22/23 0725)  Pulse: 61 (11/22/23 0725)  Temperature: 97.7 °F (36.5 °C) (11/22/23 0725)  Temp Source: Oral (11/22/23 0725)  Respirations: 18 (11/22/23 0725)  Height: 4' 7" (139.7 cm) (11/20/23 1145)  Weight - Scale: 63 kg (139 lb) (11/20/23 1145)  SpO2: 94 % (11/22/23 0725)    Exam:   Physical Exam  Vitals and nursing note reviewed. Constitutional:       General: She is not in acute distress. Appearance: Normal appearance. She is normal weight. She is not ill-appearing, toxic-appearing or diaphoretic. HENT:      Head: Normocephalic and atraumatic. Eyes:      General: No scleral icterus. Cardiovascular:      Rate and Rhythm: Normal rate and regular rhythm. Pulmonary:      Effort: Pulmonary effort is normal. No respiratory distress. Breath sounds: Normal breath sounds. No stridor. No wheezing or rhonchi. Abdominal:      General: Bowel sounds are normal. There is no distension. Palpations: Abdomen is soft. There is no mass. Tenderness: There is no abdominal tenderness. Hernia: No hernia is present. Musculoskeletal:         General: No swelling. Cervical back: Neck supple. Skin:     General: Skin is warm and dry. Neurological:      Mental Status: She is oriented to person, place, and time. Mental status is at baseline. Psychiatric:         Mood and Affect: Mood normal.         Behavior: Behavior normal.       Discussion with Family: son at bedside    Discharge instructions/Information to patient and family:   See after visit summary for information provided to patient and family.       Provisions for Follow-Up Care:  See after visit summary for information related to follow-up care and any pertinent home health orders. Planned Readmission: no     Discharge Statement: This time was spent on the day of discharge. I had direct contact with the patient on the day of discharge. Greater than 50% of the total time was spent examining patient, answering all patient questions, arranging and discussing plan of care with patient as well as directly providing post-discharge instructions. Additional time then spent on discharge activities. Discharge Medications:  See after visit summary for reconciled discharge medications provided to patient and family.       ** Please Note: This note has been constructed using a voice recognition system **

## 2023-11-22 NOTE — DISCHARGE INSTRUCTIONS
Dear Pranay Vargas,     It was our pleasure to care for you here at [facility], [facility]. It is our hope that we were always able to exceed the expected standards for your care during your stay. You were hospitalized due to acute blood clot in your lungs and urinary tract infection. You were cared for on the 5th floor by Shanae Rizo PA-C under the service of Annamaria Layton DO with the [facility] who covers for your primary care physician (PCP), Alvin Phelps, while you were hospitalized. If you have any questions or concerns related to this hospitalization, you may contact us at [phone]. For follow up as well as any medication refills, we recommend that you follow up with your primary care physician. A registered nurse will reach out to you by phone within a few days after your discharge to answer any additional questions that you may have after going home. However, at this time we provide for you here, the most important instructions / recommendations at discharge:       Notable Medication Adjustments -   You were started on a blood thinner because you are found to have a blood clot in your lung. It is important that you take this medication every day and follow-up with your primary care provider for refills. Your first 30 days of this medication are for free. You are also found to have a urinary tract infection and received IV antibiotics during her hospital stay. You will be transition to oral antibiotics that you should complete at home. Important follow up information -   You will need to see your primary care physician in 1 week's time. Please review this entire after visit summary as additional general instructions including medication list, appointments, activity, diet, any pertinent wound care, and other additional recommendations from your care team that may be provided for you.       Sincerely,     Shanae Rizo PA-C

## 2023-11-22 NOTE — ASSESSMENT & PLAN NOTE
Presented with right upper back pain / right calf pain - as well as pleuritic chest pain over the last several days  Tested positive for COVID on 11/7/2023  CTA PE protocol: subsegmental pulmonary embolus at medial right lobe, stable hilar and bilateral mediastinal adenopathy  On room air with normal O2 sats  BNP unremarkable   In addition to being COVID-positive, patient used estradiol vaginal cream for recurrent UTIs  Denies any recent long car trips or surgeries  Bilateral venous duplex to assess for DVT -negative  Echocardiogram: EF 91%, grade 1 diastolic dysfunction, mild aortic regurgitation, aortic valve sclerosis, trace tricuspid regurg  S/p heparin drip --transition to oral Eliquis loading dose

## 2023-11-22 NOTE — PLAN OF CARE
Problem: Potential for Falls  Goal: Patient will remain free of falls  Description: INTERVENTIONS:  - Educate patient/family on patient safety including physical limitations  - Instruct patient to call for assistance with activity   - Consult OT/PT to assist with strengthening/mobility   - Keep Call bell within reach  - Keep bed low and locked with side rails adjusted as appropriate  - Keep care items and personal belongings within reach  - Initiate and maintain comfort rounds  - Make Fall Risk Sign visible to staff  - Apply yellow socks and bracelet for high fall risk patients  - Consider moving patient to room near nurses station  Outcome: Progressing     Problem: PAIN - ADULT  Goal: Verbalizes/displays adequate comfort level or baseline comfort level  Description: Interventions:  - Encourage patient to monitor pain and request assistance  - Assess pain using appropriate pain scale  - Administer analgesics based on type and severity of pain and evaluate response  - Implement non-pharmacological measures as appropriate and evaluate response  - Consider cultural and social influences on pain and pain management  - Notify physician/advanced practitioner if interventions unsuccessful or patient reports new pain  Outcome: Progressing     Problem: INFECTION - ADULT  Goal: Absence or prevention of progression during hospitalization  Description: INTERVENTIONS:  - Assess and monitor for signs and symptoms of infection  - Monitor lab/diagnostic results  - Monitor all insertion sites, i.e. indwelling lines, tubes, and drains  - Monitor endotracheal if appropriate and nasal secretions for changes in amount and color  - Adger appropriate cooling/warming therapies per order  - Administer medications as ordered  - Instruct and encourage patient and family to use good hand hygiene technique  - Identify and instruct in appropriate isolation precautions for identified infection/condition  Outcome: Progressing  Goal: Absence of fever/infection during neutropenic period  Description: INTERVENTIONS:  - Monitor WBC    Outcome: Progressing     Problem: SAFETY ADULT  Goal: Maintain or return to baseline ADL function  Description: INTERVENTIONS:  -  Assess patient's ability to carry out ADLs; assess patient's baseline for ADL function and identify physical deficits which impact ability to perform ADLs (bathing, care of mouth/teeth, toileting, grooming, dressing, etc.)  - Assess/evaluate cause of self-care deficits   - Assess range of motion  - Assess patient's mobility; develop plan if impaired  - Assess patient's need for assistive devices and provide as appropriate  - Encourage maximum independence but intervene and supervise when necessary  - Involve family in performance of ADLs  - Assess for home care needs following discharge   - Consider OT consult to assist with ADL evaluation and planning for discharge  - Provide patient education as appropriate  Outcome: Progressing  Goal: Maintains/Returns to pre admission functional level  Description: INTERVENTIONS:  - Perform AM-PAC 6 Click Basic Mobility/ Daily Activity assessment daily.  - Set and communicate daily mobility goal to care team and patient/family/caregiver. - Collaborate with rehabilitation services on mobility goals if consulted  - Perform Range of Motion 3 times a day. - Reposition patient every 3 hours.   - Dangle patient 3 times a day  - Stand patient 3 times a day  - Ambulate patient 3 times a day  - Out of bed to chair 3 times a day   - Out of bed for meals 3 times a day  - Out of bed for toileting  - Record patient progress and toleration of activity level   Outcome: Progressing     Problem: DISCHARGE PLANNING  Goal: Discharge to home or other facility with appropriate resources  Description: INTERVENTIONS:  - Identify barriers to discharge w/patient and caregiver  - Arrange for needed discharge resources and transportation as appropriate  - Identify discharge learning needs (meds, wound care, etc.)  - Arrange for interpretive services to assist at discharge as needed  - Refer to Case Management Department for coordinating discharge planning if the patient needs post-hospital services based on physician/advanced practitioner order or complex needs related to functional status, cognitive ability, or social support system  Outcome: Progressing     Problem: Knowledge Deficit  Goal: Patient/family/caregiver demonstrates understanding of disease process, treatment plan, medications, and discharge instructions  Description: Complete learning assessment and assess knowledge base. Interventions:  - Provide teaching at level of understanding  - Provide teaching via preferred learning methods  Outcome: Progressing     Problem: Nutrition/Hydration-ADULT  Goal: Nutrient/Hydration intake appropriate for improving, restoring or maintaining nutritional needs  Description: Monitor and assess patient's nutrition/hydration status for malnutrition. Collaborate with interdisciplinary team and initiate plan and interventions as ordered. Monitor patient's weight and dietary intake as ordered or per policy. Utilize nutrition screening tool and intervene as necessary. Determine patient's food preferences and provide high-protein, high-caloric foods as appropriate.      INTERVENTIONS:  - Monitor oral intake, urinary output, labs, and treatment plans  - Assess nutrition and hydration status and recommend course of action  - Evaluate amount of meals eaten  - Assist patient with eating if necessary   - Allow adequate time for meals  - Recommend/ encourage appropriate diets, oral nutritional supplements, and vitamin/mineral supplements  - Order, calculate, and assess calorie counts as needed  - Recommend, monitor, and adjust tube feedings and TPN/PPN based on assessed needs  - Assess need for intravenous fluids  - Provide specific nutrition/hydration education as appropriate  - Include patient/family/caregiver in decisions related to nutrition  Outcome: Progressing     Problem: CARDIOVASCULAR - ADULT  Goal: Maintains optimal cardiac output and hemodynamic stability  Description: INTERVENTIONS:  - Monitor I/O, vital signs and rhythm  - Monitor for S/S and trends of decreased cardiac output  - Administer and titrate ordered vasoactive medications to optimize hemodynamic stability  - Assess quality of pulses, skin color and temperature  - Assess for signs of decreased coronary artery perfusion  - Instruct patient to report change in severity of symptoms  Outcome: Progressing  Goal: Absence of cardiac dysrhythmias or at baseline rhythm  Description: INTERVENTIONS:  - Continuous cardiac monitoring, vital signs, obtain 12 lead EKG if ordered  - Administer antiarrhythmic and heart rate control medications as ordered  - Monitor electrolytes and administer replacement therapy as ordered  Outcome: Progressing     Problem: RESPIRATORY - ADULT  Goal: Achieves optimal ventilation and oxygenation  Description: INTERVENTIONS:  - Assess for changes in respiratory status  - Assess for changes in mentation and behavior  - Position to facilitate oxygenation and minimize respiratory effort  - Oxygen administered by appropriate delivery if ordered  - Initiate smoking cessation education as indicated  - Encourage broncho-pulmonary hygiene including cough, deep breathe, Incentive Spirometry  - Assess the need for suctioning and aspirate as needed  - Assess and instruct to report SOB or any respiratory difficulty  - Respiratory Therapy support as indicated  Outcome: Progressing

## 2023-11-22 NOTE — ASSESSMENT & PLAN NOTE
UA nitrite positive, pyuria  Urine culture growing gram negative rods  History of multidrug-resistant UTI of Klebsiella as well as Morganella which were both sensitive to aztreonam  Received aztreonam x2 days  Urine culture now growing E. coli susceptible to cefazolin- De-escalate antibiotics  Transition to oral keflex to complete 7 day abx course

## 2023-11-22 NOTE — ASSESSMENT & PLAN NOTE
As evidenced tachycardia, leukocytosis   Source - UTI   Treated with IV antibiotics  Transition to orals to complete course for UTI

## 2023-11-24 ENCOUNTER — TELEPHONE (OUTPATIENT)
Dept: FAMILY MEDICINE CLINIC | Facility: CLINIC | Age: 73
End: 2023-11-24

## 2023-11-25 NOTE — TELEPHONE ENCOUNTER
----- Message from Alfonzo Aden PA-C sent at 11/22/2023  1:09 PM EST -----  Regarding: hospital stay  Thank you for allowing us to participate in the care of your patient, Ani Roberts, who was hospitalized from 11/18/2023 through 11/22/2023 with the admitting diagnosis of sepsis, acute UTI, and acute PE. Patient was treated with antibiotics and transitioned to oral antibiotics to complete a 7-day antibiotic course. Patient was also treated for acute PE with heparin drip. She was ultimately transition to oral Eliquis and will be discharged home on loading dose. She should follow-up with you in 1 week's time. She did not require home supplemental oxygen as her O2 levels remained stable while hospitalized. If you have any additional questions or would like to discuss further, please feel free to contact me.     Alfonzo Aden PA-C  [facility], Hospitalist  [phone]

## 2023-11-27 ENCOUNTER — OFFICE VISIT (OUTPATIENT)
Dept: FAMILY MEDICINE CLINIC | Facility: CLINIC | Age: 73
End: 2023-11-27
Payer: COMMERCIAL

## 2023-11-27 VITALS
OXYGEN SATURATION: 99 % | TEMPERATURE: 97.4 F | SYSTOLIC BLOOD PRESSURE: 120 MMHG | BODY MASS INDEX: 33.06 KG/M2 | DIASTOLIC BLOOD PRESSURE: 70 MMHG | WEIGHT: 142.25 LBS | HEART RATE: 77 BPM

## 2023-11-27 DIAGNOSIS — R06.02 SOB (SHORTNESS OF BREATH) ON EXERTION: ICD-10-CM

## 2023-11-27 DIAGNOSIS — I26.99 OTHER ACUTE PULMONARY EMBOLISM, UNSPECIFIED WHETHER ACUTE COR PULMONALE PRESENT (HCC): Primary | ICD-10-CM

## 2023-11-27 DIAGNOSIS — R59.0 HILAR ADENOPATHY: ICD-10-CM

## 2023-11-27 DIAGNOSIS — N39.0 RECURRENT UTI: ICD-10-CM

## 2023-11-27 DIAGNOSIS — U07.1 COVID: ICD-10-CM

## 2023-11-27 DIAGNOSIS — A41.9 SEPSIS, DUE TO UNSPECIFIED ORGANISM, UNSPECIFIED WHETHER ACUTE ORGAN DYSFUNCTION PRESENT (HCC): ICD-10-CM

## 2023-11-27 PROCEDURE — 99496 TRANSJ CARE MGMT HIGH F2F 7D: CPT | Performed by: NURSE PRACTITIONER

## 2023-11-27 NOTE — PROGRESS NOTES
Assessment & Plan     1. Other acute pulmonary embolism, unspecified whether acute cor pulmonale present [facility])  Assessment & Plan:  Patient with positive PE. Now on eliquis. Will have her follow up with pulmonary and hematology     Orders:  -     Ambulatory Referral to Pulmonology; Future  -     apixaban (Eliquis) 5 mg; Take 1 tablet (5 mg total) by mouth 2 (two) times a day  -     AMB E-CONSULT TO HEMATOLOGY    2. COVID  Assessment & Plan:  Patient positive 11/7. Now with PE. Orders:  -     AMB E-CONSULT TO HEMATOLOGY    3. Hilar adenopathy  Assessment & Plan:  Stable on CT. Referral to pulmonary ordered     Orders:  -     Ambulatory Referral to Pulmonology; Future    4. Recurrent UTI  Assessment & Plan:  Patient is on estradiol cream with covid positive PE. Should hold estradiol cream until consult with hematology       5. Sepsis, due to unspecified organism, unspecified whether acute organ dysfunction present [facility])  Assessment & Plan:  Resolved       6. SOB (shortness of breath) on exertion  Assessment & Plan:  Still having chai symptoms. She still having pleuritic plain but improving     Orders:  -     Ambulatory Referral to Pulmonology; Future         Subjective     Transitional Care Management Review:   Glen Santamaria is a 68 y.o. female here for TCM follow up. During the TCM phone call patient stated:  TCM Call     Date and time call was made  11/22/2023  4:44 PM    Date of Admission  11/18/22    Date of discharge  11/23/22    Diagnosis  Pulmonary embolism    Disposition  Home    Current Symptoms  None      TCM Call     Post hospital issues  None    Scheduled for follow up? Yes    I have advised the patient to call PCP with any new or worsening symptoms  Anthonycodie Camilojesus REYES        Patient presents today for hospital follow up. She was tested positive for covid 11/7 when she went to the ER for uri symptoms. She was outside treatment window for paxlovid. Went back to the ER for worsening symptoms on 11/16.  On 11/18 patient developed shortness of breath and chest pain. She was found to have PE. Duplex was negative. Loaded with oral eliquis. She is doing breathing exercises but still having right sided pleuritic pain when she takes deep breaths. Review of Systems   Constitutional:  Negative for diaphoresis, fatigue and fever. Respiratory:  Positive for shortness of breath. Cardiovascular:  Positive for chest pain. Neurological:  Negative for dizziness and headaches. Objective     /70 (BP Location: Left arm, Patient Position: Sitting, Cuff Size: Standard)   Pulse 77   Temp (!) 97.4 °F (36.3 °C) (Temporal)   Wt 64.5 kg (142 lb 4 oz)   SpO2 99%   BMI 33.06 kg/m²      Physical Exam  Vitals and nursing note reviewed. Constitutional:       General: She is not in acute distress. Appearance: Normal appearance. She is obese. She is not ill-appearing or diaphoretic. HENT:      Head: Normocephalic and atraumatic. Right Ear: External ear normal.      Left Ear: External ear normal.   Eyes:      Extraocular Movements: Extraocular movements intact. Conjunctiva/sclera: Conjunctivae normal.   Cardiovascular:      Rate and Rhythm: Normal rate and regular rhythm. Heart sounds: Normal heart sounds, S1 normal and S2 normal. No murmur heard. Comments: Prominent varicose veins and spider veins to bilateral lower extremities   Pulmonary:      Effort: Pulmonary effort is normal.      Breath sounds: Normal breath sounds. Musculoskeletal:      Right lower leg: No edema. Left lower leg: No edema. Skin:     Coloration: Skin is not pale. Neurological:      Mental Status: She is alert and oriented to person, place, and time. Psychiatric:         Mood and Affect: Mood normal.         Behavior: Behavior normal.         Thought Content:  Thought content normal.         Judgment: Judgment normal.         Medications have been reviewed by provider in current encounter    Shanti Mane WAI           E-Consult Consent: Patient aware and consented that a consult is being performed on their behalf to hematology. The patient is aware that they may not see the provider face to face, but the provider may review their medical record and give recommendations. The patient may elect to see the provider in person if requested.

## 2023-11-27 NOTE — ASSESSMENT & PLAN NOTE
Patient is on estradiol cream with covid positive PE.  Should hold estradiol cream until consult with hematology

## 2023-11-28 ENCOUNTER — E-CONSULT (OUTPATIENT)
Dept: OTHER | Facility: HOSPITAL | Age: 73
End: 2023-11-28
Payer: COMMERCIAL

## 2023-11-28 DIAGNOSIS — I26.99 OTHER ACUTE PULMONARY EMBOLISM, UNSPECIFIED WHETHER ACUTE COR PULMONALE PRESENT (HCC): Primary | ICD-10-CM

## 2023-11-28 PROCEDURE — 99447 NTRPROF PH1/NTRNET/EHR 11-20: CPT | Performed by: NURSE PRACTITIONER

## 2023-11-28 NOTE — PROGRESS NOTES
E-Consult  Lilo Dobbs 68 y.o. female MRN: [id number]  Encounter Date: 11/28/23      Reason for Consult / Principal Problem: DVT management, PE    Consulting Provider: Kamlesh Glass    Requesting Provider: WAI Lemon       ASSESSMENT:  Patient is a 56-year-old female with a history of recurrent UTI, osteoporosis, GERD who presented to [facility] ED for evaluation of pleuritic chest pain on 11/18/2023. She was seen previously in ED on 11/7/2023 for symptoms of persistent cough and flulike symptoms. She was found to be COVID-positive at this time. Work-up for pleuritic chest pain included CTA of chest PE study. This was positive for subsegmental pulmonary embolus at the medial right lower lobe  VAS bilateral lower limb venous duplex study was negative for any evidence of acute or chronic DVT in either right or left lower limb  Echocardiogram demonstrated EF 13%, grade 1 diastolic dysfunction, mild aortic regurgitation, aortic valve sclerosis, trace tricuspid regurg     She was initially anticoagulated with heparin drip and transition to Eliquis on discharge    This was patient's first VTE. She was on estradiol cream at time of clotting event    RECOMMENDATIONS:  I suspect patient's recent PE was provoked in the setting of COVID. Covid is associated with hypercoagulability. It is unlikely that use of estradiol cream had a role in her clotting event. She is on anticoagulation with Eliquis. Recommend she continue for minimum of 6 months. Would recommend repeating CTA Chest in 6 months to check for clot resolution. Please reach out with any questions. Total time spent 11-20 minutes, >50% of the total time devoted to medical consultative verbal/EMR discussion between providers. Written report will be generated in the EMR. Liza Maki

## 2023-11-30 NOTE — UTILIZATION REVIEW
NOTIFICATION OF ADMISSION DISCHARGE   This is a Notification of Discharge from [address] [facility]. Please be advised that this patient has been discharge from our facility. Below you will find the admission and discharge date and time including the patient’s disposition. UTILIZATION REVIEW CONTACT:  Rachele Warner  Utilization   Network Utilization Review Department  Phone: [phone] x carefully listen to the prompts. All voicemails are confidential.  Email: [email or web address]     ADMISSION INFORMATION  PRESENTATION DATE: 11/18/2023  7:00 PM  OBERVATION ADMISSION DATE:   INPATIENT ADMISSION DATE: 11/19/23 12:03 AM   DISCHARGE DATE: 11/22/2023  3:00 PM   DISPOSITION:Home/Self Care    Network Utilization Review Department  ATTENTION: Please call with any questions or concerns to [phone] and carefully listen to the prompts so that you are directed to the right person. All voicemails are confidential.   For Discharge needs, contact Care Management DC Support Team at [phone] opt. 2  Send all requests for admission clinical reviews, approved or denied determinations and any other requests to dedicated fax number below belonging to the campus where the patient is receiving treatment.  List of dedicated fax numbers for the Facilities:  [place] DENIALS (Administrative/Medical Necessity) [phone]   DISCHARGE SUPPORT TEAM (Network) [phone] [address] [facility] (Maternity/NICU/Pediatrics) [phone]   [address] [facility] [address] [phone] [address] [facility] [phone]   [address] [place] [address]  [address]  [phone]

## 2023-12-04 ENCOUNTER — TELEPHONE (OUTPATIENT)
Dept: FAMILY MEDICINE CLINIC | Facility: CLINIC | Age: 73
End: 2023-12-04

## 2023-12-04 NOTE — TELEPHONE ENCOUNTER
Portia Chavez from [facility] called to verify that patient was seen for her hospital follow up. Portia Chavez was advised that patient was discharged 11/22/23 and was seen in the office 11/27/23. As per Portia Chavez they receive a delayed notification.

## 2024-01-04 ENCOUNTER — CONSULT (OUTPATIENT)
Dept: PULMONOLOGY | Facility: CLINIC | Age: 74
End: 2024-01-04
Payer: COMMERCIAL

## 2024-01-04 VITALS
HEIGHT: 55 IN | SYSTOLIC BLOOD PRESSURE: 118 MMHG | HEART RATE: 65 BPM | BODY MASS INDEX: 32.86 KG/M2 | OXYGEN SATURATION: 97 % | WEIGHT: 142 LBS | TEMPERATURE: 97.3 F | DIASTOLIC BLOOD PRESSURE: 80 MMHG

## 2024-01-04 DIAGNOSIS — Z23 ENCOUNTER FOR IMMUNIZATION: ICD-10-CM

## 2024-01-04 DIAGNOSIS — R06.09 DOE (DYSPNEA ON EXERTION): ICD-10-CM

## 2024-01-04 DIAGNOSIS — R59.0 HILAR ADENOPATHY: ICD-10-CM

## 2024-01-04 DIAGNOSIS — I26.99 OTHER ACUTE PULMONARY EMBOLISM, UNSPECIFIED WHETHER ACUTE COR PULMONALE PRESENT (HCC): Primary | ICD-10-CM

## 2024-01-04 DIAGNOSIS — R06.02 SOB (SHORTNESS OF BREATH) ON EXERTION: ICD-10-CM

## 2024-01-04 DIAGNOSIS — R91.1 PULMONARY NODULE: ICD-10-CM

## 2024-01-04 PROCEDURE — 99204 OFFICE O/P NEW MOD 45 MIN: CPT | Performed by: INTERNAL MEDICINE

## 2024-01-04 PROCEDURE — 94618 PULMONARY STRESS TESTING: CPT

## 2024-01-04 PROCEDURE — G0008 ADMIN INFLUENZA VIRUS VAC: HCPCS

## 2024-01-04 PROCEDURE — 90662 IIV NO PRSV INCREASED AG IM: CPT

## 2024-01-04 NOTE — PROGRESS NOTES
Pulmonary Consultation   Cece Cintron 73 y.o. female MRN: [id number]    Encounter: [id number]      Reason for consultation:   Pulmonary embolism    Requesting physician:  WAI Baez      Assessment:  Pulmonary embolism.  Mediastinal lymphadenopathy  Dyspnea on exertion.  Pulmonary nodule.  Encounter for immunization.    Plan:   6-minute walk test.  Continue Eliquis to complete total of 3 months and then discontinue.  Influenza vaccine.  Follow-up as needed.    Discussion:   The patient's CT findings are suggestive of pulmonary embolism.  However it could be also an artifact.  Lower extremity Dopplers showed no evidence of DVT.  The safest course is to continue with the Eliquis for 3 months and then discontinue.  No further workup is needed.  The left lower lobe pulmonary nodule has been stable over 2 years.  It was followed until January 2020.  Her dyspnea on exertion is mainly due to deconditioning.  I have encouraged her to take regular walks every day to improve stamina.  She did not desaturate on the 6-minute walk test.  We gave her the influenza vaccine today.  I have not scheduled her for another appointment however I will be happy to see her in the future if the need arises.      History of Present Illness   HPI:  Cece Cintron is a 73 y.o. female who is here today for evaluation of pulmonary embolism.  In November the patient had right-sided chest pain and went to the emergency room.  She was evaluated and had CT scan of the chest which showed a filling defect in the right lower lobe pulmonary artery branch.  She was admitted with the diagnosis of pulmonary embolism and started on anticoagulation.  She was discharged home on Eliquis.    The patient has history of left lower lobe pulmonary nodule which was followed and remained stable.  She was last seen in our office in January 2020.  She has dyspnea on exertion which is chronic.  She denies any cough, wheezing or sputum production.  The patient is  "very sedentary.    Review of systems:  12 point review of systems was completed and was otherwise negative except as listed in HPI.      Historical Information   Past Medical History:   Diagnosis Date    Age-related osteoporosis without current pathological fracture 7/25/2018    Class 1 obesity in adult 3/26/2018    Gastroesophageal reflux disease without esophagitis 3/26/2018    Hernia, hiatal     Insomnia     Osteoporosis     Recurrent UTI 5/3/2018    Stroke (HCC)     Ulcer, gastric, acute     Vitamin D deficiency      Past Surgical History:   Procedure Laterality Date    BREAST BIOPSY Left     1997    HERNIA REPAIR      umbilical hernia     TUBAL LIGATION       Family History   Problem Relation Age of Onset    Diabetes Mother     Heart disease Father     COPD Father     Prostate cancer Brother         age unknown    No Known Problems Sister     No Known Problems Daughter     No Known Problems Maternal Grandmother     No Known Problems Maternal Grandfather     No Known Problems Paternal Grandmother     No Known Problems Paternal Grandfather     No Known Problems Sister     No Known Problems Sister     No Known Problems Sister     No Known Problems Sister     No Known Problems Sister        Family History:  COPD in her father.     Social History:  The patient is  and lives with her .  She is a lifelong non-smoker.      Meds/Allergies   No current facility-administered medications for this visit.     (Not in a hospital admission)    Allergies   Allergen Reactions    Ciprofloxacin Shortness Of Breath    Sulfa Antibiotics Shortness Of Breath     Stress and rash.    Nitrofurantoin Nausea Only    Other Nausea Only     macrobid       Vitals: Blood pressure 118/80, pulse 65, temperature (!) 97.3 °F (36.3 °C), temperature source Tympanic, height 4' 7\" (1.397 m), weight 64.4 kg (142 lb), SpO2 97%, not currently breastfeeding.,      Physical exam:        Head/eyes:    Normocephalic, without obvious abnormality, " atraumatic,         PERRL, extraocular muscles intact, no scleral icterus    Nose:   Nares normal, septum midline, mucosa normal, no drainage    or sinus tenderness   Throat:   Moist mucous membranes, no thrush   Neck:   Supple, trachea midline, no adenopathy; no carotid    bruit or JVD   Lungs:   Clear breath sounds.  No wheezing or rhonchi.        Heart:    Regular rate and rhythm, S1 and S2 normal, no murmur, rub   or gallop   Abdomen:     Soft, non-tender, bowel sounds active all four quadrants,     no masses, no organomegaly   Extremities:   Extremities normal, atraumatic, no cyanosis or edema   Skin:   Warm, dry, turgor normal, no rashes or lesions   Neurologic:   CNII-XII intact, normal strength, non-focal             Imaging and other studies: I have personally reviewed pertinent films in PACS CT scan of the chest is reviewed on PACS system.  There is a lack of opacification of the subsegmental branch of the right lower lobe of the pulmonary artery.  This could be artifact.  Less convincing for acute PE.  Left lower lobe pulmonary nodule which has been stable.    Dopplers of the lower extremities were negative for DVT.    Lab Results   Component Value Date    WBC 6.87 11/21/2023    HGB 11.6 11/21/2023    HCT 35.7 11/21/2023    MCV 81 (L) 11/21/2023     11/21/2023     Lab Results   Component Value Date    SODIUM 135 11/21/2023    K 3.9 11/21/2023     11/21/2023    CO2 22 11/21/2023    BUN 15 11/21/2023    CREATININE 0.80 11/21/2023    GLUC 92 11/21/2023    CALCIUM 9.5 11/21/2023     6-minute walk test was done today in the office.  The patient started with a heart rate of 69 bpm and O2 saturation 96%.  At the end of the test the heart rate was 90 bpm and O2 saturation 96%.  She walked 308 m.        Jeremiah Garibay MD

## 2024-01-11 DIAGNOSIS — E78.2 MIXED HYPERLIPIDEMIA: ICD-10-CM

## 2024-01-11 DIAGNOSIS — M79.602 LEFT ARM PAIN: ICD-10-CM

## 2024-01-11 RX ORDER — GABAPENTIN 100 MG/1
100 CAPSULE ORAL
Qty: 90 CAPSULE | Refills: 1 | Status: SHIPPED | OUTPATIENT
Start: 2024-01-11

## 2024-01-11 RX ORDER — ROSUVASTATIN CALCIUM 5 MG/1
5 TABLET, COATED ORAL DAILY
Qty: 90 TABLET | Refills: 1 | Status: SHIPPED | OUTPATIENT
Start: 2024-01-11

## 2024-01-18 PROBLEM — N30.01 ACUTE CYSTITIS WITH HEMATURIA: Status: RESOLVED | Noted: 2023-11-19 | Resolved: 2024-01-18

## 2024-02-21 PROBLEM — N39.0 RECURRENT UTI: Status: RESOLVED | Noted: 2018-05-03 | Resolved: 2024-02-21

## 2024-04-04 ENCOUNTER — HOSPITAL ENCOUNTER (OUTPATIENT)
Dept: RADIOLOGY | Facility: HOSPITAL | Age: 74
Discharge: HOME | End: 2024-04-04
Payer: COMMERCIAL

## 2024-04-04 ENCOUNTER — APPOINTMENT (OUTPATIENT)
Dept: LAB | Facility: HOSPITAL | Age: 74
End: 2024-04-04
Payer: COMMERCIAL

## 2024-04-04 ENCOUNTER — OFFICE VISIT (OUTPATIENT)
Dept: FAMILY MEDICINE CLINIC | Facility: CLINIC | Age: 74
End: 2024-04-04
Payer: COMMERCIAL

## 2024-04-04 VITALS
HEIGHT: 55 IN | BODY MASS INDEX: 32.17 KG/M2 | DIASTOLIC BLOOD PRESSURE: 80 MMHG | HEART RATE: 85 BPM | OXYGEN SATURATION: 96 % | TEMPERATURE: 97.9 F | SYSTOLIC BLOOD PRESSURE: 110 MMHG | WEIGHT: 139 LBS

## 2024-04-04 DIAGNOSIS — Z12.31 ENCOUNTER FOR SCREENING MAMMOGRAM FOR MALIGNANT NEOPLASM OF BREAST: ICD-10-CM

## 2024-04-04 DIAGNOSIS — I26.99 OTHER ACUTE PULMONARY EMBOLISM, UNSPECIFIED WHETHER ACUTE COR PULMONALE PRESENT (HCC): ICD-10-CM

## 2024-04-04 DIAGNOSIS — J20.9 ACUTE BRONCHITIS, UNSPECIFIED ORGANISM: ICD-10-CM

## 2024-04-04 DIAGNOSIS — E78.2 MIXED HYPERLIPIDEMIA: ICD-10-CM

## 2024-04-04 DIAGNOSIS — J20.9 ACUTE BRONCHITIS, UNSPECIFIED ORGANISM: Primary | ICD-10-CM

## 2024-04-04 LAB
ALBUMIN SERPL BCP-MCNC: 4.8 G/DL (ref 3.5–5)
ALP SERPL-CCNC: 75 U/L (ref 34–104)
ALT SERPL W P-5'-P-CCNC: 16 U/L (ref 7–52)
ANION GAP SERPL CALCULATED.3IONS-SCNC: 7 MMOL/L (ref 4–13)
AST SERPL W P-5'-P-CCNC: 24 U/L (ref 13–39)
BILIRUB SERPL-MCNC: 0.82 MG/DL (ref 0.2–1)
BUN SERPL-MCNC: 11 MG/DL (ref 5–25)
CALCIUM SERPL-MCNC: 10.7 MG/DL (ref 8.4–10.2)
CHLORIDE SERPL-SCNC: 102 MMOL/L (ref 96–108)
CHOLEST SERPL-MCNC: 122 MG/DL
CO2 SERPL-SCNC: 29 MMOL/L (ref 21–32)
CREAT SERPL-MCNC: 0.92 MG/DL (ref 0.6–1.3)
GFR SERPL CREATININE-BSD FRML MDRD: 62 ML/MIN/1.73SQ M
GLUCOSE P FAST SERPL-MCNC: 102 MG/DL (ref 65–99)
HDLC SERPL-MCNC: 58 MG/DL
LDLC SERPL CALC-MCNC: 38 MG/DL (ref 0–100)
NONHDLC SERPL-MCNC: 64 MG/DL
POTASSIUM SERPL-SCNC: 4.3 MMOL/L (ref 3.5–5.3)
PROT SERPL-MCNC: 8.3 G/DL (ref 6.4–8.4)
SARS-COV-2 AG UPPER RESP QL IA: NEGATIVE
SL AMB POCT RAPID FLU A: NEGATIVE
SL AMB POCT RAPID FLU B: NEGATIVE
SODIUM SERPL-SCNC: 138 MMOL/L (ref 135–147)
TRIGL SERPL-MCNC: 132 MG/DL
VALID CONTROL: NORMAL

## 2024-04-04 PROCEDURE — 99214 OFFICE O/P EST MOD 30 MIN: CPT | Performed by: NURSE PRACTITIONER

## 2024-04-04 PROCEDURE — 87811 SARS-COV-2 COVID19 W/OPTIC: CPT | Performed by: NURSE PRACTITIONER

## 2024-04-04 PROCEDURE — 87804 INFLUENZA ASSAY W/OPTIC: CPT | Performed by: NURSE PRACTITIONER

## 2024-04-04 PROCEDURE — 80061 LIPID PANEL: CPT | Performed by: NURSE PRACTITIONER

## 2024-04-04 PROCEDURE — G2211 COMPLEX E/M VISIT ADD ON: HCPCS | Performed by: NURSE PRACTITIONER

## 2024-04-04 PROCEDURE — 36415 COLL VENOUS BLD VENIPUNCTURE: CPT | Performed by: NURSE PRACTITIONER

## 2024-04-04 PROCEDURE — 71046 X-RAY EXAM CHEST 2 VIEWS: CPT

## 2024-04-04 PROCEDURE — 80053 COMPREHEN METABOLIC PANEL: CPT | Performed by: NURSE PRACTITIONER

## 2024-04-04 RX ORDER — CEFUROXIME AXETIL 500 MG/1
500 TABLET ORAL EVERY 12 HOURS SCHEDULED
Qty: 20 TABLET | Refills: 0 | Status: SHIPPED | OUTPATIENT
Start: 2024-04-04 | End: 2024-04-14

## 2024-04-04 RX ORDER — ALBUTEROL SULFATE 90 UG/1
2 AEROSOL, METERED RESPIRATORY (INHALATION) EVERY 6 HOURS PRN
Qty: 6.7 G | Refills: 5 | Status: SHIPPED | OUTPATIENT
Start: 2024-04-04

## 2024-04-04 NOTE — ASSESSMENT & PLAN NOTE
Patient was seen by pulmonary in January whom recommended 3 additional months of eliqus then discontinuation

## 2024-04-04 NOTE — ASSESSMENT & PLAN NOTE
Patient likely experiencing acute bronchitis vs. Pneumonia. Patient has influenza A and COVID done at the office which were negative. Clinical picture of the patient worrisome for pneumonia. Will send patient for CXR to evaluate. Will start the patient on ceftin 500 mg BID for 10 days for suspected pneumonia. Albuterol will be prescribed as well for patient's current wheezing.

## 2024-04-04 NOTE — PROGRESS NOTES
"Name: Cece Cintron      : 1950      MRN: [id number]  Encounter Provider: WAI Baez  Encounter Date: 2024   Encounter department: [facility]    Assessment & Plan     1. Acute bronchitis, unspecified organism  Assessment & Plan:  Patient likely experiencing acute bronchitis vs. Pneumonia. Patient has influenza A and COVID done at the office which were negative. Clinical picture of the patient worrisome for pneumonia. Will send patient for CXR to evaluate. Will start the patient on ceftin 500 mg BID for 10 days for suspected pneumonia. Albuterol will be prescribed as well for patient's current wheezing.     Orders:  -     POCT Rapid Covid Ag  -     POCT rapid flu A and B  -     XR chest pa & lateral; Future; Expected date: 2024  -     cefuroxime (CEFTIN) 500 mg tablet; Take 1 tablet (500 mg total) by mouth every 12 (twelve) hours for 10 days  -     albuterol (Proventil HFA) 90 mcg/act inhaler; Inhale 2 puffs every 6 (six) hours as needed for wheezing    2. Encounter for screening mammogram for malignant neoplasm of breast  -     Mammo screening bilateral w 3d & cad; Future    3. Other acute pulmonary embolism, unspecified whether acute cor pulmonale present (HCC)  Assessment & Plan:  Patient was seen by pulmonary in January whom recommended 3 additional months of eliqus then discontinuation       4. Mixed hyperlipidemia  -     Lipid panel  -     Comprehensive metabolic panel           Subjective      Cece is a 73 y.o. female with a PMHx significant for PE, GERD, and HLD presenting to the office for evaluation of headache, congestion, cough and fever x5 days. Patient reports that she has been experiencing symptoms since 2024. She has been taking lemon, tea, and tylenol which she claims has seemed to help. Pt reports that her symptoms seem to be worse at night. Cough described as a \"wet, mucous filled\" cough that is sometimes productive. No distinct " color change to mucous. No other sick contacts. Has had these symptoms 2x prior in her lifetime.       Review of Systems   Constitutional:  Positive for appetite change (decrease) and chills. Negative for fever.   HENT:  Positive for congestion, rhinorrhea and sinus pressure. Negative for ear pain and sore throat.    Eyes:  Positive for pain. Negative for visual disturbance.   Respiratory:  Positive for cough and wheezing. Negative for shortness of breath.    Cardiovascular:  Negative for chest pain and palpitations.   Gastrointestinal:  Negative for abdominal pain, diarrhea, nausea and vomiting.   Genitourinary:  Negative for dysuria and hematuria.   Musculoskeletal:  Negative for back pain.   Skin:  Negative for rash.   Neurological:  Positive for light-headedness and headaches. Negative for dizziness, syncope and weakness.   Psychiatric/Behavioral:  Negative for behavioral problems.    All other systems reviewed and are negative.      Current Outpatient Medications on File Prior to Visit   Medication Sig   • b complex vitamins tablet Take 1 tablet by mouth daily     • Bone Meal-Vitamin D (BONE MEAL PO) Take by mouth   • calcium carbonate-vitamin D (OSCAL-D) 500 mg-200 units per tablet Take 1 tablet by mouth daily with breakfast   • Collagen Hydrolysate POWD Use     • D-Mannose POWD Take 1 Dose by mouth in the morning   • gabapentin (NEURONTIN) 100 mg capsule TAKE ONE CAPSULE BY MOUTH AT BEDTIME   • mirtazapine (REMERON) 7.5 MG tablet Take 1 tablet (7.5 mg total) by mouth daily at bedtime   • Multiple Vitamin (multivitamin) tablet Take 1 tablet by mouth daily   • omeprazole (PriLOSEC) 20 mg delayed release capsule Take 1 capsule (20 mg total) by mouth daily   • rosuvastatin (CRESTOR) 5 mg tablet TAKE ONE TABLET BY MOUTH EVERY DAY   • terazosin (HYTRIN) 1 mg capsule Take 1 capsule (1 mg total) by mouth daily at bedtime   • benzonatate (TESSALON PERLES) 100 mg capsule Take 1 capsule (100 mg total) by mouth every 8  "(eight) hours (Patient not taking: Reported on 11/27/2023)   • estradiol (ESTRACE) 0.1 mg/g vaginal cream Apply a pea-sized amount to your finger and placed around the urethral opening and around the vaginal opening twice a week on Wednesday and Sunday (Patient not taking: Reported on 1/4/2024)       Objective     /80 (BP Location: Right arm, Patient Position: Sitting, Cuff Size: Standard)   Pulse 85   Temp 97.9 °F (36.6 °C) (Tympanic)   Ht 4' 7\" (1.397 m)   Wt 63 kg (139 lb)   SpO2 96%   BMI 32.31 kg/m²     Physical Exam  Vitals and nursing note reviewed.   Constitutional:       General: She is not in acute distress.     Appearance: Normal appearance. She is not ill-appearing.   HENT:      Head: Normocephalic and atraumatic.      Right Ear: Tympanic membrane normal.      Left Ear: There is impacted cerumen.      Mouth/Throat:      Mouth: Mucous membranes are moist.      Pharynx: Oropharynx is clear. No oropharyngeal exudate or posterior oropharyngeal erythema.   Eyes:      Extraocular Movements: Extraocular movements intact.      Conjunctiva/sclera: Conjunctivae normal.      Pupils: Pupils are equal, round, and reactive to light.   Neck:      Vascular: No carotid bruit.   Cardiovascular:      Rate and Rhythm: Normal rate and regular rhythm.      Heart sounds: Normal heart sounds. No murmur heard.  Pulmonary:      Effort: No respiratory distress.      Breath sounds: Wheezing present. No rhonchi or rales.   Musculoskeletal:         General: No swelling.      Cervical back: Neck supple.   Lymphadenopathy:      Cervical: No cervical adenopathy.   Skin:     General: Skin is warm and dry.      Findings: No rash.   Neurological:      General: No focal deficit present.      Mental Status: She is alert.   Psychiatric:         Behavior: Behavior normal.       WAI Baez    "

## 2024-04-04 NOTE — PROGRESS NOTES
Name: Cece Cintron      : 1950      MRN: [id number]  Encounter Provider: WAI Baez  Encounter Date: 2024   Encounter department: [facility]    Assessment & Plan     1. Acute bronchitis, unspecified organism  Assessment & Plan:  Patient likely experiencing acute bronchitis vs. Pneumonia. Patient has influenza A and COVID done at the office which were negative. Clinical picture of the patient worrisome for pneumonia. Will send patient for CXR to evaluate. Will start the patient on ceftin 500 mg BID for 10 days for suspected pneumonia. Albuterol will be prescribed as well for patient's current wheezing.     Orders:  -     POCT Rapid Covid Ag  -     POCT rapid flu A and B  -     XR chest pa & lateral; Future; Expected date: 2024  -     cefuroxime (CEFTIN) 500 mg tablet; Take 1 tablet (500 mg total) by mouth every 12 (twelve) hours for 10 days  -     albuterol (Proventil HFA) 90 mcg/act inhaler; Inhale 2 puffs every 6 (six) hours as needed for wheezing    2. Encounter for screening mammogram for malignant neoplasm of breast  -     Mammo screening bilateral w 3d & cad; Future    3. Other acute pulmonary embolism, unspecified whether acute cor pulmonale present (HCC)  Assessment & Plan:  Patient was seen by pulmonary in January whom recommended 3 additional months of eliqus then discontinuation       4. Mixed hyperlipidemia  -     Lipid panel  -     Comprehensive metabolic panel           Subjective      HPI  Review of Systems    Current Outpatient Medications on File Prior to Visit   Medication Sig   • b complex vitamins tablet Take 1 tablet by mouth daily     • Bone Meal-Vitamin D (BONE MEAL PO) Take by mouth   • calcium carbonate-vitamin D (OSCAL-D) 500 mg-200 units per tablet Take 1 tablet by mouth daily with breakfast   • Collagen Hydrolysate POWD Use     • D-Mannose POWD Take 1 Dose by mouth in the morning   • gabapentin (NEURONTIN) 100 mg capsule TAKE ONE CAPSULE BY  "MOUTH AT BEDTIME   • mirtazapine (REMERON) 7.5 MG tablet Take 1 tablet (7.5 mg total) by mouth daily at bedtime   • Multiple Vitamin (multivitamin) tablet Take 1 tablet by mouth daily   • omeprazole (PriLOSEC) 20 mg delayed release capsule Take 1 capsule (20 mg total) by mouth daily   • rosuvastatin (CRESTOR) 5 mg tablet TAKE ONE TABLET BY MOUTH EVERY DAY   • terazosin (HYTRIN) 1 mg capsule Take 1 capsule (1 mg total) by mouth daily at bedtime   • benzonatate (TESSALON PERLES) 100 mg capsule Take 1 capsule (100 mg total) by mouth every 8 (eight) hours (Patient not taking: Reported on 11/27/2023)   • estradiol (ESTRACE) 0.1 mg/g vaginal cream Apply a pea-sized amount to your finger and placed around the urethral opening and around the vaginal opening twice a week on Wednesday and Sunday (Patient not taking: Reported on 1/4/2024)       Objective     /80 (BP Location: Right arm, Patient Position: Sitting, Cuff Size: Standard)   Pulse 85   Temp 97.9 °F (36.6 °C) (Tympanic)   Ht 4' 7\" (1.397 m)   Wt 63 kg (139 lb)   SpO2 96%   BMI 32.31 kg/m²     Physical Exam  WAI Baez    "

## 2024-04-07 DIAGNOSIS — K21.9 GASTROESOPHAGEAL REFLUX DISEASE WITHOUT ESOPHAGITIS: ICD-10-CM

## 2024-04-07 RX ORDER — OMEPRAZOLE 20 MG/1
20 CAPSULE, DELAYED RELEASE ORAL DAILY
Qty: 90 CAPSULE | Refills: 1 | Status: SHIPPED | OUTPATIENT
Start: 2024-04-07

## 2024-04-15 ENCOUNTER — VBI (OUTPATIENT)
Dept: ADMINISTRATIVE | Facility: OTHER | Age: 74
End: 2024-04-15

## 2024-04-29 ENCOUNTER — TELEPHONE (OUTPATIENT)
Dept: FAMILY MEDICINE CLINIC | Facility: CLINIC | Age: 74
End: 2024-04-29

## 2024-04-29 DIAGNOSIS — G47.9 SLEEP DISTURBANCE: ICD-10-CM

## 2024-04-29 NOTE — TELEPHONE ENCOUNTER
Called patient today in regards of rescheduling her appointment for Aug 5th as provider will be on vacation during the week of Aug 5th.     If patient calls back please please advice in regards of this and that a letter had been sent to her address. Please reschedule AWV needed for pt.     Thank you.

## 2024-04-30 RX ORDER — MIRTAZAPINE 7.5 MG/1
7.5 TABLET, FILM COATED ORAL
Qty: 30 TABLET | Refills: 5 | Status: SHIPPED | OUTPATIENT
Start: 2024-04-30

## 2024-05-08 ENCOUNTER — RA CDI HCC (OUTPATIENT)
Dept: OTHER | Facility: HOSPITAL | Age: 74
End: 2024-05-08

## 2024-05-16 ENCOUNTER — OFFICE VISIT (OUTPATIENT)
Dept: FAMILY MEDICINE CLINIC | Facility: CLINIC | Age: 74
End: 2024-05-16
Payer: COMMERCIAL

## 2024-05-16 VITALS
DIASTOLIC BLOOD PRESSURE: 68 MMHG | HEIGHT: 55 IN | SYSTOLIC BLOOD PRESSURE: 126 MMHG | BODY MASS INDEX: 33.09 KG/M2 | HEART RATE: 72 BPM | OXYGEN SATURATION: 95 % | WEIGHT: 143 LBS

## 2024-05-16 DIAGNOSIS — E66.09 CLASS 1 OBESITY DUE TO EXCESS CALORIES WITHOUT SERIOUS COMORBIDITY WITH BODY MASS INDEX (BMI) OF 33.0 TO 33.9 IN ADULT: ICD-10-CM

## 2024-05-16 DIAGNOSIS — N39.0 RECURRENT UTI: ICD-10-CM

## 2024-05-16 DIAGNOSIS — E78.2 MIXED HYPERLIPIDEMIA: Primary | ICD-10-CM

## 2024-05-16 DIAGNOSIS — G47.9 SLEEP DISTURBANCE: ICD-10-CM

## 2024-05-16 PROBLEM — M79.602 LEFT ARM PAIN: Status: RESOLVED | Noted: 2023-02-24 | Resolved: 2024-05-16

## 2024-05-16 PROBLEM — M62.89 PELVIC FLOOR DYSFUNCTION: Status: RESOLVED | Noted: 2021-08-31 | Resolved: 2024-05-16

## 2024-05-16 PROBLEM — J20.9 ACUTE BRONCHITIS: Status: RESOLVED | Noted: 2024-04-04 | Resolved: 2024-05-16

## 2024-05-16 PROBLEM — U07.1 COVID: Status: RESOLVED | Noted: 2023-11-19 | Resolved: 2024-05-16

## 2024-05-16 PROBLEM — R33.9 INCOMPLETE BLADDER EMPTYING: Status: ACTIVE | Noted: 2020-09-15

## 2024-05-16 PROBLEM — I26.99 PULMONARY EMBOLISM (HCC): Status: RESOLVED | Noted: 2023-11-19 | Resolved: 2024-05-16

## 2024-05-16 PROCEDURE — 99214 OFFICE O/P EST MOD 30 MIN: CPT | Performed by: NURSE PRACTITIONER

## 2024-05-16 PROCEDURE — G2211 COMPLEX E/M VISIT ADD ON: HCPCS | Performed by: NURSE PRACTITIONER

## 2024-05-16 RX ORDER — ESTRADIOL 0.1 MG/G
CREAM VAGINAL
Qty: 42.5 G | Refills: 10 | Status: SHIPPED | OUTPATIENT
Start: 2024-05-16

## 2024-05-16 RX ORDER — ROSUVASTATIN CALCIUM 5 MG/1
5 TABLET, COATED ORAL 2 TIMES WEEKLY
Qty: 90 TABLET | Refills: 0 | Status: SHIPPED | OUTPATIENT
Start: 2024-05-16

## 2024-05-16 NOTE — ASSESSMENT & PLAN NOTE
Patient was using estradiol cream which significantly helped her symptoms. She wants to go back on this. Will reordered

## 2024-05-16 NOTE — ASSESSMENT & PLAN NOTE
Patient recent lipid panel was normal with good LDL 35   Continue on crestor 5mg she doesn't take everyday so will just have her continue twice per week to maintain.

## 2024-05-16 NOTE — PROGRESS NOTES
Ambulatory Visit  Name: Cece Cintron      : 1950      MRN: [id number]  Encounter Provider: WAI Baez  Encounter Date: 2024   Encounter department: [facility]    Assessment & Plan   1. Mixed hyperlipidemia  Assessment & Plan:  Patient recent lipid panel was normal with good LDL 35   Continue on crestor 5mg she doesn't take everyday so will just have her continue twice per week to maintain.   Orders:  -     rosuvastatin (CRESTOR) 5 mg tablet; Take 1 tablet (5 mg total) by mouth 2 (two) times a week  2. Recurrent UTI  Assessment & Plan:  Patient was using estradiol cream which significantly helped her symptoms. She wants to go back on this. Will reordered   Orders:  -     estradiol (ESTRACE) 0.1 mg/g vaginal cream; Apply a pea-sized amount to your finger and placed around the urethral opening and around the vaginal opening twice a week on Wednesday and   3. Class 1 obesity due to excess calories without serious comorbidity with body mass index (BMI) of 33.0 to 33.9 in adult  Assessment & Plan:  She continues to watch her diet and is working on weight loss   4. Sleep disturbance  Assessment & Plan:  Patient remains stable on her remeron.      History of Present Illness   {Disappearing Hyperlinks I Encounters * My Last Note * Since Last Visit * History :54671}  Patient presents today for 6 week follow up   She has been having increasing vaginal discomfort and urinary frequency. Was prescribed estradiol cream by urology which significantly help her symptoms wants to go back on this.         Review of Systems   Constitutional:  Negative for fatigue.   HENT:  Negative for congestion.    Respiratory:  Negative for cough and shortness of breath.    Gastrointestinal: Negative.    Genitourinary:  Positive for frequency and vaginal discharge.   Neurological:  Negative for headaches.       Objective   {Disappearing Hyperlinks   Review Vitals * Enter New Vitals * Results Review  "* Labs * Imaging * Cardiology * Procedures * Lung Cancer Screening :51835}  /68 (BP Location: Left arm, Patient Position: Sitting, Cuff Size: Standard)   Pulse 72   Ht 4' 7\" (1.397 m)   Wt 64.9 kg (143 lb)   SpO2 95%   BMI 33.24 kg/m²     Physical Exam  Vitals and nursing note reviewed.   Constitutional:       General: She is not in acute distress.     Appearance: Normal appearance. She is obese. She is not ill-appearing or diaphoretic.   HENT:      Head: Normocephalic and atraumatic.      Right Ear: External ear normal.      Left Ear: External ear normal.   Eyes:      Extraocular Movements: Extraocular movements intact.      Conjunctiva/sclera: Conjunctivae normal.   Cardiovascular:      Rate and Rhythm: Normal rate and regular rhythm.      Heart sounds: Normal heart sounds, S1 normal and S2 normal. No murmur heard.  Pulmonary:      Effort: Pulmonary effort is normal.      Breath sounds: Normal breath sounds.   Abdominal:      General: Bowel sounds are normal.      Palpations: Abdomen is soft.      Tenderness: There is no abdominal tenderness. There is no right CVA tenderness or left CVA tenderness.   Skin:     Coloration: Skin is not pale.   Neurological:      Mental Status: She is alert and oriented to person, place, and time.   Psychiatric:         Mood and Affect: Mood normal.         Behavior: Behavior normal.         Thought Content: Thought content normal.         Judgment: Judgment normal.       Administrative Statements {Disappearing Hyperlinks I  [facility] Service [facility]}    "

## 2024-06-11 DIAGNOSIS — E78.2 MIXED HYPERLIPIDEMIA: ICD-10-CM

## 2024-06-12 RX ORDER — ROSUVASTATIN CALCIUM 5 MG/1
5 TABLET, COATED ORAL DAILY
Qty: 90 TABLET | Refills: 1 | Status: SHIPPED | OUTPATIENT
Start: 2024-06-12

## 2024-06-15 PROBLEM — N39.0 RECURRENT UTI: Status: RESOLVED | Noted: 2018-05-03 | Resolved: 2024-06-15

## 2024-07-06 DIAGNOSIS — M79.602 LEFT ARM PAIN: ICD-10-CM

## 2024-07-07 RX ORDER — GABAPENTIN 100 MG/1
100 CAPSULE ORAL
Qty: 90 CAPSULE | Refills: 1 | Status: SHIPPED | OUTPATIENT
Start: 2024-07-07

## 2024-08-30 DIAGNOSIS — R33.9 INCOMPLETE BLADDER EMPTYING: ICD-10-CM

## 2024-08-30 RX ORDER — TERAZOSIN 1 MG/1
1 CAPSULE ORAL
Qty: 90 CAPSULE | Refills: 1 | Status: SHIPPED | OUTPATIENT
Start: 2024-08-30

## 2024-09-13 ENCOUNTER — OFFICE VISIT (OUTPATIENT)
Dept: FAMILY MEDICINE CLINIC | Facility: CLINIC | Age: 74
End: 2024-09-13
Payer: COMMERCIAL

## 2024-09-13 VITALS
DIASTOLIC BLOOD PRESSURE: 80 MMHG | HEIGHT: 55 IN | BODY MASS INDEX: 32.63 KG/M2 | OXYGEN SATURATION: 97 % | HEART RATE: 61 BPM | WEIGHT: 141 LBS | SYSTOLIC BLOOD PRESSURE: 110 MMHG

## 2024-09-13 DIAGNOSIS — E78.2 MIXED HYPERLIPIDEMIA: ICD-10-CM

## 2024-09-13 DIAGNOSIS — R91.1 INCIDENTAL LUNG NODULE, > 3MM AND < 8MM: ICD-10-CM

## 2024-09-13 DIAGNOSIS — R80.9 PROTEINURIA, UNSPECIFIED TYPE: ICD-10-CM

## 2024-09-13 DIAGNOSIS — R59.0 HILAR ADENOPATHY: ICD-10-CM

## 2024-09-13 DIAGNOSIS — M40.10 KYPHOSIS DUE TO OSTEOPOROSIS: ICD-10-CM

## 2024-09-13 DIAGNOSIS — H91.92 HEARING DIFFICULTY OF LEFT EAR: ICD-10-CM

## 2024-09-13 DIAGNOSIS — E55.9 VITAMIN D DEFICIENCY: ICD-10-CM

## 2024-09-13 DIAGNOSIS — Z00.00 MEDICARE ANNUAL WELLNESS VISIT, SUBSEQUENT: Primary | ICD-10-CM

## 2024-09-13 DIAGNOSIS — R06.02 SOB (SHORTNESS OF BREATH) ON EXERTION: ICD-10-CM

## 2024-09-13 DIAGNOSIS — M81.0 KYPHOSIS DUE TO OSTEOPOROSIS: ICD-10-CM

## 2024-09-13 DIAGNOSIS — M81.0 AGE-RELATED OSTEOPOROSIS WITHOUT CURRENT PATHOLOGICAL FRACTURE: ICD-10-CM

## 2024-09-13 DIAGNOSIS — Z12.11 SCREEN FOR COLON CANCER: ICD-10-CM

## 2024-09-13 PROBLEM — R82.2 BILIRUBIN IN URINE: Status: RESOLVED | Noted: 2020-08-27 | Resolved: 2024-09-13

## 2024-09-13 PROCEDURE — G0439 PPPS, SUBSEQ VISIT: HCPCS | Performed by: NURSE PRACTITIONER

## 2024-09-13 PROCEDURE — 99214 OFFICE O/P EST MOD 30 MIN: CPT | Performed by: NURSE PRACTITIONER

## 2024-09-13 NOTE — ASSESSMENT & PLAN NOTE
Patient protein did normalize in her urine  Will do routine check for next year labs     Orders:    Urinalysis with microscopic; Future

## 2024-09-13 NOTE — ASSESSMENT & PLAN NOTE
Patient has pain in her upper back  She was advised to take gabapentin and tylenol for pain   She noticed improved back pain with the gabapentin as needed

## 2024-09-13 NOTE — PROGRESS NOTES
Ambulatory Visit  Name: Cece Cintron      : 1950      MRN: [id number]  Encounter Provider: WAI Baez  Encounter Date: 2024   Encounter department: [facility]    Assessment & Plan  Medicare annual wellness visit, subsequent         SOB (shortness of breath) on exertion  Patient reports this is resolved   Feels like she is breathing better          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  Patient has pain in her upper back  She was advised to take gabapentin and tylenol for pain   She noticed improved back pain with the gabapentin as needed          Vitamin D deficiency  Patient is on supplement   I ordered labs for next visit     Orders:    Vitamin D 25 hydroxy; Future    Mixed hyperlipidemia  Last lab in April was normal  She is on crestor 5mg twice per week  Follow up labs ordered     Orders:    Lipid Panel with Direct LDL reflex; Future    Comprehensive metabolic panel; Future    TSH, 3rd generation with Free T4 reflex; Future    Incidental lung nodule, > 3mm and < 8mm  Patient had consult with pulmonary doesn't need any additional follow up          Proteinuria, unspecified type  Patient protein did normalize in her urine  Will do routine check for next year labs     Orders:    Urinalysis with microscopic; Future    Hilar adenopathy  Stable from pulmonary standpoint and doesn't need additional follow up          Screen for colon cancer    Orders:    Ambulatory Referral to Gastroenterology; Future    Age-related osteoporosis without current pathological fracture  Patient is due for dexa this year  She also needs to set up her infusion which she does annually  Managed by rheumatology     Orders:    DXA bone density spine hip and pelvis; Future      Depression Screening and Follow-up Plan: Patient was screened for depression during today's encounter. They screened negative with a PHQ-2 score of 0.      Preventive health issues were discussed with patient, and age appropriate  screening tests were ordered as noted in patient's After Visit Summary. Personalized health advice and appropriate referrals for health education or preventive services given if needed, as noted in patient's After Visit Summary.    History of Present Illness     Patient presents today for follow up  She has no concerns today   She states her shortness of breath has completely resolved     She has back pain that is chronic not sure what she can take        Patient Care Team:  WAI Hagan as PCP - General (Family Medicine)  WAI Hagan as PCP - PCP-[facility] Attributed-Roster    Review of Systems   Constitutional: Negative.    Respiratory:  Negative for chest tightness and shortness of breath.    Musculoskeletal:  Positive for back pain.     Medical History Reviewed by provider this encounter:       Annual Wellness Visit Questionnaire   Cece is here for her Subsequent Wellness visit.     Health Risk Assessment:   Patient rates overall health as very good. Patient feels that their physical health rating is slightly better. Patient is very satisfied with their life. Eyesight was rated as same. Hearing was rated as slightly worse. Patient feels that their emotional and mental health rating is same. Patients states they are never, rarely angry. Patient states they are never, rarely unusually tired/fatigued. Pain experienced in the last 7 days has been none. Patient states that she has experienced no weight loss or gain in last 6 months.     Depression Screening:   PHQ-2 Score: 0      Fall Risk Screening:   In the past year, patient has experienced: no history of falling in past year      Urinary Incontinence Screening:   Patient has not leaked urine accidently in the last six months.     Home Safety:  Patient does not have trouble with stairs inside or outside of their home. Patient has working smoke alarms and has working carbon monoxide detector. Home safety hazards include: none.      Nutrition:   Current diet is Regular.     Medications:   Patient is currently taking over-the-counter supplements. OTC medications include: see medication list. Patient is able to manage medications.     Activities of Daily Living (ADLs)/Instrumental Activities of Daily Living (IADLs):   Walk and transfer into and out of bed and chair?: Yes  Dress and groom yourself?: Yes    Bathe or shower yourself?: Yes    Feed yourself? Yes  Do your laundry/housekeeping?: Yes  Manage your money, pay your bills and track your expenses?: Yes  Make your own meals?: Yes    Do your own shopping?: Yes    Previous Hospitalizations:   Any hospitalizations or ED visits within the last 12 months?: No      Advance Care Planning:   Living will: Yes    Durable POA for healthcare: Yes    Advanced directive: Yes      PREVENTIVE SCREENINGS      Cardiovascular Screening:    General: History Lipid Disorder and Screening Current      Diabetes Screening:     General: Screening Current      Colorectal Cancer Screening:     General: Screening Current      Breast Cancer Screening:     General: Screening Current      Cervical Cancer Screening:    General: Screening Not Indicated      Osteoporosis Screening:    General: History Osteoporosis      Abdominal Aortic Aneurysm (AAA) Screening:        General: Screening Not Indicated      Lung Cancer Screening:     General: Screening Not Indicated      Hepatitis C Screening:    General: Screening Current    Screening, Brief Intervention, and Referral to Treatment (SBIRT)    Screening  Typical number of drinks in a day: 0  Typical number of drinks in a week: 0  Interpretation: Low risk drinking behavior.    AUDIT-C Screenin) How often did you have a drink containing alcohol in the past year? never  2) How many drinks did you have on a typical day when you were drinking in the past year? 0  3) How often did you have 6 or more drinks on one occasion in the past year? never    AUDIT-C Score:  "0  Interpretation: Score 0-2 (female): Negative screen for alcohol misuse    Single Item Drug Screening:  How often have you used an illegal drug (including marijuana) or a prescription medication for non-medical reasons in the past year? never    Single Item Drug Screen Score: 0  Interpretation: Negative screen for possible drug use disorder    Brief Intervention  Alcohol & drug use screenings were reviewed. No concerns regarding substance use disorder identified.     Other Counseling Topics:   Calcium and vitamin D intake and regular weightbearing exercise.     Social Determinants of Health     Financial Resource Strain: Low Risk  (8/1/2023)    Overall Financial Resource Strain (CARDIA)     Difficulty of Paying Living Expenses: Not very hard   Food Insecurity: No Food Insecurity (9/13/2024)    Hunger Vital Sign     Worried About Running Out of Food in the Last Year: Never true     Ran Out of Food in the Last Year: Never true   Transportation Needs: No Transportation Needs (9/13/2024)    PRAPARE - Transportation     Lack of Transportation (Medical): No     Lack of Transportation (Non-Medical): No   Housing Stability: Low Risk  (9/13/2024)    Housing Stability Vital Sign     Unable to Pay for Housing in the Last Year: No     Number of Times Moved in the Last Year: 0     Homeless in the Last Year: No   Utilities: Not At Risk (9/13/2024)    [facility] Utilities     Threatened with loss of utilities: No     No results found.    Objective     /80 (BP Location: Left arm, Patient Position: Sitting, Cuff Size: Standard)   Pulse 61   Ht 4' 6\" (1.372 m)   Wt 64 kg (141 lb)   SpO2 97%   BMI 34.00 kg/m²     Physical Exam  Vitals and nursing note reviewed.   Constitutional:       General: She is not in acute distress.     Appearance: Normal appearance. She is obese. She is not ill-appearing or diaphoretic.   HENT:      Head: Normocephalic and atraumatic.      Right Ear: External ear normal.      Left Ear: External ear " normal.   Eyes:      Extraocular Movements: Extraocular movements intact.      Conjunctiva/sclera: Conjunctivae normal.   Cardiovascular:      Rate and Rhythm: Normal rate and regular rhythm.      Heart sounds: Normal heart sounds, S1 normal and S2 normal. No murmur heard.     Comments: Prominent varicose veins   Pulmonary:      Effort: Pulmonary effort is normal.      Breath sounds: Normal breath sounds.   Skin:     Coloration: Skin is not pale.   Neurological:      Mental Status: She is alert and oriented to person, place, and time.   Psychiatric:         Mood and Affect: Mood normal.         Behavior: Behavior normal.         Thought Content: Thought content normal.         Judgment: Judgment normal.

## 2024-09-13 NOTE — ASSESSMENT & PLAN NOTE
Last lab in April was normal  She is on crestor 5mg twice per week  Follow up labs ordered     Orders:    Lipid Panel with Direct LDL reflex; Future    Comprehensive metabolic panel; Future    TSH, 3rd generation with Free T4 reflex; Future

## 2024-09-13 NOTE — PATIENT INSTRUCTIONS
Medicare Preventive Visit Patient Instructions  Thank you for completing your Welcome to Medicare Visit or Medicare Annual Wellness Visit today. Your next wellness visit will be due in one year (9/14/2025).  The screening/preventive services that you may require over the next 5-10 years are detailed below. Some tests may not apply to you based off risk factors and/or age. Screening tests ordered at today's visit but not completed yet may show as past due. Also, please note that scanned in results may not display below.  Preventive Screenings:  Service Recommendations Previous Testing/Comments   Colorectal Cancer Screening  * Colonoscopy    * Fecal Occult Blood Test (FOBT)/Fecal Immunochemical Test (FIT)  * Fecal DNA/Cologuard Test  * Flexible Sigmoidoscopy Age: 45-75 years old   Colonoscopy: every 10 years (may be performed more frequently if at higher risk)  OR  FOBT/FIT: every 1 year  OR  Cologuard: every 3 years  OR  Sigmoidoscopy: every 5 years  Screening may be recommended earlier than age 45 if at higher risk for colorectal cancer. Also, an individualized decision between you and your healthcare provider will decide whether screening between the ages of 76-85 would be appropriate. Colonoscopy: 12/05/2019  FOBT/FIT: Not on file  Cologuard: Not on file  Sigmoidoscopy: Not on file    Screening Current     Breast Cancer Screening Age: 40+ years old  Frequency: every 1-2 years  Not required if history of left and right mastectomy Mammogram: 03/29/2023    Screening Current   Cervical Cancer Screening Between the ages of 21-29, pap smear recommended once every 3 years.   Between the ages of 30-65, can perform pap smear with HPV co-testing every 5 years.   Recommendations may differ for women with a history of total hysterectomy, cervical cancer, or abnormal pap smears in past. Pap Smear: Not on file    Screening Not Indicated   Hepatitis C Screening Once for adults born between 1945 and 1965  More frequently in  patients at high risk for Hepatitis C Hep C Antibody: 08/28/2020    Screening Current   Diabetes Screening 1-2 times per year if you're at risk for diabetes or have pre-diabetes Fasting glucose: 102 mg/dL (4/4/2024)  A1C: No results in last 5 years (No results in last 5 years)  Screening Current   Cholesterol Screening Once every 5 years if you don't have a lipid disorder. May order more often based on risk factors. Lipid panel: 04/04/2024    Screening Not Indicated  History Lipid Disorder     Other Preventive Screenings Covered by Medicare:  Abdominal Aortic Aneurysm (AAA) Screening: covered once if your at risk. You're considered to be at risk if you have a family history of AAA.  Lung Cancer Screening: covers low dose CT scan once per year if you meet all of the following conditions: (1) Age 55-77; (2) No signs or symptoms of lung cancer; (3) Current smoker or have quit smoking within the last 15 years; (4) You have a tobacco smoking history of at least 20 pack years (packs per day multiplied by number of years you smoked); (5) You get a written order from a healthcare provider.  Glaucoma Screening: covered annually if you're considered high risk: (1) You have diabetes OR (2) Family history of glaucoma OR (3)  aged 50 and older OR (4)  American aged 65 and older  Osteoporosis Screening: covered every 2 years if you meet one of the following conditions: (1) You're estrogen deficient and at risk for osteoporosis based off medical history and other findings; (2) Have a vertebral abnormality; (3) On glucocorticoid therapy for more than 3 months; (4) Have primary hyperparathyroidism; (5) On osteoporosis medications and need to assess response to drug therapy.   Last bone density test (DXA Scan): 10/21/2022.  HIV Screening: covered annually if you're between the age of 15-65. Also covered annually if you are younger than 15 and older than 65 with risk factors for HIV infection. For pregnant  patients, it is covered up to 3 times per pregnancy.    Immunizations:  Immunization Recommendations   Influenza Vaccine Annual influenza vaccination during flu season is recommended for all persons aged >= 6 months who do not have contraindications   Pneumococcal Vaccine   * Pneumococcal conjugate vaccine = PCV13 (Prevnar 13), PCV15 (Vaxneuvance), PCV20 (Prevnar 20)  * Pneumococcal polysaccharide vaccine = PPSV23 (Pneumovax) Adults 19-63 yo with certain risk factors or if 65+ yo  If never received any pneumonia vaccine: recommend Prevnar 20 (PCV20)  Give PCV20 if previously received 1 dose of PCV13 or PPSV23   Hepatitis B Vaccine 3 dose series if at intermediate or high risk (ex: diabetes, end stage renal disease, liver disease)   Respiratory syncytial virus (RSV) Vaccine - COVERED BY MEDICARE PART D  * RSVPreF3 (Arexvy) CDC recommends that adults 60 years of age and older may receive a single dose of RSV vaccine using shared clinical decision-making (SCDM)   Tetanus (Td) Vaccine - COST NOT COVERED BY MEDICARE PART B Following completion of primary series, a booster dose should be given every 10 years to maintain immunity against tetanus. Td may also be given as tetanus wound prophylaxis.   Tdap Vaccine - COST NOT COVERED BY MEDICARE PART B Recommended at least once for all adults. For pregnant patients, recommended with each pregnancy.   Shingles Vaccine (Shingrix) - COST NOT COVERED BY MEDICARE PART B  2 shot series recommended in those 19 years and older who have or will have weakened immune systems or those 50 years and older     Health Maintenance Due:      Topic Date Due    Breast Cancer Screening: Mammogram  03/29/2024    Colorectal Cancer Screening  12/05/2024    DXA SCAN  10/18/2024    Hepatitis C Screening  Completed     Immunizations Due:      Topic Date Due    Influenza Vaccine (1) 09/01/2024     Advance Directives   What are advance directives?  Advance directives are legal documents that state your  wishes and plans for medical care. These plans are made ahead of time in case you lose your ability to make decisions for yourself. Advance directives can apply to any medical decision, such as the treatments you want, and if you want to donate organs.   What are the types of advance directives?  There are many types of advance directives, and each state has rules about how to use them. You may choose a combination of any of the following:  Living will:  This is a written record of the treatment you want. You can also choose which treatments you do not want, which to limit, and which to stop at a certain time. This includes surgery, medicine, IV fluid, and tube feedings.   Durable power of  for healthcare (DPAHC):  This is a written record that states who you want to make healthcare choices for you when you are unable to make them for yourself. This person, called a proxy, is usually a family member or a friend. You may choose more than 1 proxy.  Do not resuscitate (DNR) order:  A DNR order is used in case your heart stops beating or you stop breathing. It is a request not to have certain forms of treatment, such as CPR. A DNR order may be included in other types of advance directives.  Medical directive:  This covers the care that you want if you are in a coma, near death, or unable to make decisions for yourself. You can list the treatments you want for each condition. Treatment may include pain medicine, surgery, blood transfusions, dialysis, IV or tube feedings, and a ventilator (breathing machine).  Values history:  This document has questions about your views, beliefs, and how you feel and think about life. This information can help others choose the care that you would choose.  Why are advance directives important?  An advance directive helps you control your care. Although spoken wishes may be used, it is better to have your wishes written down. Spoken wishes can be misunderstood, or not followed.  Treatments may be given even if you do not want them. An advance directive may make it easier for your family to make difficult choices about your care.   Weight Management   Why it is important to manage your weight:  Being overweight increases your risk of health conditions such as heart disease, high blood pressure, type 2 diabetes, and certain types of cancer. It can also increase your risk for osteoarthritis, sleep apnea, and other respiratory problems. Aim for a slow, steady weight loss. Even a small amount of weight loss can lower your risk of health problems.  How to lose weight safely:  A safe and healthy way to lose weight is to eat fewer calories and get regular exercise. You can lose up about 1 pound a week by decreasing the number of calories you eat by 500 calories each day.   Healthy meal plan for weight management:  A healthy meal plan includes a variety of foods, contains fewer calories, and helps you stay healthy. A healthy meal plan includes the following:  Eat whole-grain foods more often.  A healthy meal plan should contain fiber. Fiber is the part of grains, fruits, and vegetables that is not broken down by your body. Whole-grain foods are healthy and provide extra fiber in your diet. Some examples of whole-grain foods are whole-wheat breads and pastas, oatmeal, brown rice, and bulgur.  Eat a variety of vegetables every day.  Include dark, leafy greens such as spinach, kale, nils greens, and mustard greens. Eat yellow and orange vegetables such as carrots, sweet potatoes, and winter squash.   Eat a variety of fruits every day.  Choose fresh or canned fruit (canned in its own juice or light syrup) instead of juice. Fruit juice has very little or no fiber.  Eat low-fat dairy foods.  Drink fat-free (skim) milk or 1% milk. Eat fat-free yogurt and low-fat cottage cheese. Try low-fat cheeses such as mozzarella and other reduced-fat cheeses.  Choose meat and other protein foods that are low in fat.   Choose beans or other legumes such as split peas or lentils. Choose fish, skinless poultry (chicken or turkey), or lean cuts of red meat (beef or pork). Before you cook meat or poultry, cut off any visible fat.   Use less fat and oil.  Try baking foods instead of frying them. Add less fat, such as margarine, sour cream, regular salad dressing and mayonnaise to foods. Eat fewer high-fat foods. Some examples of high-fat foods include french fries, doughnuts, ice cream, and cakes.  Eat fewer sweets.  Limit foods and drinks that are high in sugar. This includes candy, cookies, regular soda, and sweetened drinks.  Exercise:  Exercise at least 30 minutes per day on most days of the week. Some examples of exercise include walking, biking, dancing, and swimming. You can also fit in more physical activity by taking the stairs instead of the elevator or parking farther away from stores. Ask your healthcare provider about the best exercise plan for you.    © Copyright [employer] 2018 Information is for End User's use only and may not be sold, redistributed or otherwise used for commercial purposes. All illustrations and images included in CareNotes® are the copyrighted property of A.D.A[employer]M., Inc. or [employer]       no

## 2024-09-13 NOTE — ASSESSMENT & PLAN NOTE
Patient is due for dexa this year  She also needs to set up her infusion which she does annually  Managed by rheumatology     Orders:    DXA bone density spine hip and pelvis; Future

## 2024-09-13 NOTE — ASSESSMENT & PLAN NOTE
Patient is on supplement   I ordered labs for next visit     Orders:    Vitamin D 25 hydroxy; Future

## 2024-09-27 ENCOUNTER — TELEPHONE (OUTPATIENT)
Age: 74
End: 2024-09-27

## 2024-09-30 NOTE — TELEPHONE ENCOUNTER
"Called infusion center and spoke to Mariela about canceling th patient Reclast as they have reached the limit they are able to get. Appt canceled. Reached out the patient as well and relayed the providers message that they no long need yearly Reclast as they have had \" 3 consecutive yearly Reclast\" and advised to schedule a follow up with Dr. Thomas or their PCP to follow up with the osteoporosis gave office Phone number as well  "
Please let infusion center and patient know that she no longer needs any more Reclast, she has already completed the limit of 3 consecutive yearly Reclast. Also, she needs a follow-up scheduled with me or her PCP regarding osteoporosis.  
Yessi/ JAD [facility]    Needs orders faxed to her ASAP    Pt appt is on Tuesday , Oct 1st    FAX: [phone]  
Lytes, glucose

## 2024-10-01 ENCOUNTER — HOSPITAL ENCOUNTER (OUTPATIENT)
Dept: INFUSION CENTER | Facility: CLINIC | Age: 74
End: 2024-10-01

## 2024-10-04 DIAGNOSIS — K21.9 GASTROESOPHAGEAL REFLUX DISEASE WITHOUT ESOPHAGITIS: ICD-10-CM

## 2024-10-21 ENCOUNTER — PREP FOR PROCEDURE (OUTPATIENT)
Age: 74
End: 2024-10-21

## 2024-10-21 ENCOUNTER — TELEPHONE (OUTPATIENT)
Age: 74
End: 2024-10-21

## 2024-10-21 DIAGNOSIS — Z12.11 SCREENING FOR COLON CANCER: Primary | ICD-10-CM

## 2024-10-21 NOTE — TELEPHONE ENCOUNTER
Scheduled date of colonoscopy (as of today): 1/15/25  Physician performing colonoscopy: JONAS  Location of colonoscopy: [facility] RO[facility]  Bowel prep reviewed with patient: EDDIE/JESSICA  Instructions sent to [facility]    10/21/24  Screened by: Carmen Michel MA    Referring Provider     Pre- Screening:     There is no height or weight on file to calculate BMI.  Has patient been referred for a routine screening Colonoscopy? yes  Is the patient between 45-75 years old? yes      Previous Colonoscopy yes   If yes:    Date: 2019    Facility: [facility] [place]    Reason:     Does the patient want to see a Gastroenterologist prior to their procedure OR are they having any GI symptoms? no    Has the patient been hospitalized or had abdominal surgery in the past 6 months? no    Does the patient use supplemental oxygen? no    Does the patient take Coumadin, Lovenox, Plavix, Elliquis, Xarelto, or other blood thinning medication? no    Has the patient had a stroke, cardiac event, or stent placed in the past year? no      If patient is between 45yrs - 49yrs, please advise patient that we will have to confirm benefits & coverage with their insurance company for a routine screening colonoscopy.

## 2024-12-17 ENCOUNTER — TELEPHONE (OUTPATIENT)
Dept: GASTROENTEROLOGY | Facility: MEDICAL CENTER | Age: 74
End: 2024-12-17

## 2024-12-17 NOTE — TELEPHONE ENCOUNTER
Procedure Confirmation sent to patient via MYCHART for patient to review instructions for procedure

## 2024-12-18 ENCOUNTER — ANESTHESIA (OUTPATIENT)
Dept: ANESTHESIOLOGY | Facility: HOSPITAL | Age: 74
End: 2024-12-18

## 2024-12-18 ENCOUNTER — ANESTHESIA EVENT (OUTPATIENT)
Dept: ANESTHESIOLOGY | Facility: HOSPITAL | Age: 74
End: 2024-12-18

## 2025-01-02 ENCOUNTER — TELEPHONE (OUTPATIENT)
Dept: GASTROENTEROLOGY | Facility: MEDICAL CENTER | Age: 75
End: 2025-01-02

## 2025-01-02 DIAGNOSIS — M79.602 LEFT ARM PAIN: ICD-10-CM

## 2025-01-02 DIAGNOSIS — E78.2 MIXED HYPERLIPIDEMIA: ICD-10-CM

## 2025-01-02 DIAGNOSIS — G47.9 SLEEP DISTURBANCE: ICD-10-CM

## 2025-01-02 NOTE — TELEPHONE ENCOUNTER
Left voicemail and requested call back     Called patient to confirm procedure for 01/15/2025 with Dr. Guzman at [facility], asked patient to please give us a call to confirm. Patient can confirm procedure on MYCHART as well

## 2025-01-03 RX ORDER — MIRTAZAPINE 7.5 MG/1
7.5 TABLET, FILM COATED ORAL
Qty: 30 TABLET | Refills: 0 | Status: SHIPPED | OUTPATIENT
Start: 2025-01-03 | End: 2025-01-03

## 2025-01-03 RX ORDER — MIRTAZAPINE 7.5 MG/1
7.5 TABLET, FILM COATED ORAL
Qty: 90 TABLET | Refills: 0 | Status: SHIPPED | OUTPATIENT
Start: 2025-01-03

## 2025-01-03 RX ORDER — ROSUVASTATIN CALCIUM 5 MG/1
5 TABLET, COATED ORAL DAILY
Qty: 90 TABLET | Refills: 1 | Status: SHIPPED | OUTPATIENT
Start: 2025-01-03

## 2025-01-03 RX ORDER — GABAPENTIN 100 MG/1
100 CAPSULE ORAL
Qty: 90 CAPSULE | Refills: 1 | Status: SHIPPED | OUTPATIENT
Start: 2025-01-03

## 2025-01-16 ENCOUNTER — TELEPHONE (OUTPATIENT)
Dept: GASTROENTEROLOGY | Facility: MEDICAL CENTER | Age: 75
End: 2025-01-16

## 2025-01-16 NOTE — TELEPHONE ENCOUNTER
Left voicemail and requested call back     Called patient to reschedule procedure that was cancelled on 01/15/2025, asked to please call us to reschedule

## 2025-01-19 ENCOUNTER — HOSPITAL ENCOUNTER (EMERGENCY)
Facility: HOSPITAL | Age: 75
Discharge: HOME | End: 2025-01-19
Attending: EMERGENCY MEDICINE
Payer: COMMERCIAL

## 2025-01-19 ENCOUNTER — APPOINTMENT (EMERGENCY)
Dept: CT IMAGING | Facility: HOSPITAL | Age: 75
End: 2025-01-19
Payer: COMMERCIAL

## 2025-01-19 VITALS
WEIGHT: 140.65 LBS | BODY MASS INDEX: 33.91 KG/M2 | TEMPERATURE: 97.8 F | DIASTOLIC BLOOD PRESSURE: 67 MMHG | RESPIRATION RATE: 16 BRPM | SYSTOLIC BLOOD PRESSURE: 139 MMHG | HEART RATE: 68 BPM | OXYGEN SATURATION: 97 %

## 2025-01-19 DIAGNOSIS — N83.202 CYST OF LEFT OVARY: Primary | ICD-10-CM

## 2025-01-19 LAB
ALBUMIN SERPL BCG-MCNC: 4.6 G/DL (ref 3.5–5)
ALP SERPL-CCNC: 73 U/L (ref 34–104)
ALT SERPL W P-5'-P-CCNC: 15 U/L (ref 7–52)
AMORPH URATE CRY URNS QL MICRO: ABNORMAL
ANION GAP SERPL CALCULATED.3IONS-SCNC: 10 MMOL/L (ref 4–13)
AST SERPL W P-5'-P-CCNC: 20 U/L (ref 13–39)
BACTERIA UR QL AUTO: ABNORMAL /HPF
BASOPHILS # BLD AUTO: 0.04 THOUSANDS/ΜL (ref 0–0.1)
BASOPHILS NFR BLD AUTO: 1 % (ref 0–1)
BILIRUB SERPL-MCNC: 0.82 MG/DL (ref 0.2–1)
BILIRUB UR QL STRIP: NEGATIVE
BUN SERPL-MCNC: 9 MG/DL (ref 5–25)
CALCIUM SERPL-MCNC: 10.3 MG/DL (ref 8.4–10.2)
CHLORIDE SERPL-SCNC: 104 MMOL/L (ref 96–108)
CLARITY UR: CLEAR
CO2 SERPL-SCNC: 24 MMOL/L (ref 21–32)
COLOR UR: YELLOW
CREAT SERPL-MCNC: 0.8 MG/DL (ref 0.6–1.3)
EOSINOPHIL # BLD AUTO: 0.22 THOUSAND/ΜL (ref 0–0.61)
EOSINOPHIL NFR BLD AUTO: 3 % (ref 0–6)
ERYTHROCYTE [DISTWIDTH] IN BLOOD BY AUTOMATED COUNT: 13.6 % (ref 11.6–15.1)
GFR SERPL CREATININE-BSD FRML MDRD: 73 ML/MIN/1.73SQ M
GLUCOSE SERPL-MCNC: 98 MG/DL (ref 65–140)
GLUCOSE UR STRIP-MCNC: NEGATIVE MG/DL
HCT VFR BLD AUTO: 40.7 % (ref 34.8–46.1)
HGB BLD-MCNC: 13.7 G/DL (ref 11.5–15.4)
HGB UR QL STRIP.AUTO: NEGATIVE
IMM GRANULOCYTES # BLD AUTO: 0.02 THOUSAND/UL (ref 0–0.2)
IMM GRANULOCYTES NFR BLD AUTO: 0 % (ref 0–2)
KETONES UR STRIP-MCNC: NEGATIVE MG/DL
LEUKOCYTE ESTERASE UR QL STRIP: ABNORMAL
LIPASE SERPL-CCNC: 35 U/L (ref 11–82)
LYMPHOCYTES # BLD AUTO: 1.08 THOUSANDS/ΜL (ref 0.6–4.47)
LYMPHOCYTES NFR BLD AUTO: 14 % (ref 14–44)
MCH RBC QN AUTO: 27.2 PG (ref 26.8–34.3)
MCHC RBC AUTO-ENTMCNC: 33.7 G/DL (ref 31.4–37.4)
MCV RBC AUTO: 81 FL (ref 82–98)
MONOCYTES # BLD AUTO: 0.46 THOUSAND/ΜL (ref 0.17–1.22)
MONOCYTES NFR BLD AUTO: 6 % (ref 4–12)
MUCOUS THREADS UR QL AUTO: ABNORMAL
NEUTROPHILS # BLD AUTO: 6.18 THOUSANDS/ΜL (ref 1.85–7.62)
NEUTS SEG NFR BLD AUTO: 76 % (ref 43–75)
NITRITE UR QL STRIP: NEGATIVE
NON-SQ EPI CELLS URNS QL MICRO: ABNORMAL /HPF
NRBC BLD AUTO-RTO: 0 /100 WBCS
PH UR STRIP.AUTO: 6.5 [PH] (ref 4.5–8)
PLATELET # BLD AUTO: 241 THOUSANDS/UL (ref 149–390)
PMV BLD AUTO: 8.2 FL (ref 8.9–12.7)
POTASSIUM SERPL-SCNC: 4 MMOL/L (ref 3.5–5.3)
PROT SERPL-MCNC: 7.9 G/DL (ref 6.4–8.4)
PROT UR STRIP-MCNC: ABNORMAL MG/DL
RBC # BLD AUTO: 5.04 MILLION/UL (ref 3.81–5.12)
RBC #/AREA URNS AUTO: ABNORMAL /HPF
SODIUM SERPL-SCNC: 138 MMOL/L (ref 135–147)
SP GR UR STRIP.AUTO: 1.01 (ref 1–1.03)
UROBILINOGEN UR QL STRIP.AUTO: 0.2 E.U./DL
WBC # BLD AUTO: 8 THOUSAND/UL (ref 4.31–10.16)
WBC #/AREA URNS AUTO: ABNORMAL /HPF

## 2025-01-19 PROCEDURE — 99285 EMERGENCY DEPT VISIT HI MDM: CPT

## 2025-01-19 PROCEDURE — 83690 ASSAY OF LIPASE: CPT | Performed by: EMERGENCY MEDICINE

## 2025-01-19 PROCEDURE — 36415 COLL VENOUS BLD VENIPUNCTURE: CPT | Performed by: EMERGENCY MEDICINE

## 2025-01-19 PROCEDURE — 81001 URINALYSIS AUTO W/SCOPE: CPT

## 2025-01-19 PROCEDURE — 99285 EMERGENCY DEPT VISIT HI MDM: CPT | Performed by: EMERGENCY MEDICINE

## 2025-01-19 PROCEDURE — 85025 COMPLETE CBC W/AUTO DIFF WBC: CPT | Performed by: EMERGENCY MEDICINE

## 2025-01-19 PROCEDURE — 96361 HYDRATE IV INFUSION ADD-ON: CPT

## 2025-01-19 PROCEDURE — 96360 HYDRATION IV INFUSION INIT: CPT

## 2025-01-19 PROCEDURE — 80053 COMPREHEN METABOLIC PANEL: CPT | Performed by: EMERGENCY MEDICINE

## 2025-01-19 PROCEDURE — 74177 CT ABD & PELVIS W/CONTRAST: CPT

## 2025-01-19 RX ORDER — ONDANSETRON 4 MG/1
4 TABLET, ORALLY DISINTEGRATING ORAL EVERY 8 HOURS PRN
Qty: 10 TABLET | Refills: 0 | Status: SHIPPED | OUTPATIENT
Start: 2025-01-19

## 2025-01-19 RX ORDER — NAPROXEN 500 MG/1
500 TABLET ORAL EVERY 12 HOURS PRN
Qty: 15 TABLET | Refills: 0 | Status: SHIPPED | OUTPATIENT
Start: 2025-01-19

## 2025-01-19 RX ADMIN — SODIUM CHLORIDE 1000 ML: 0.9 INJECTION, SOLUTION INTRAVENOUS at 14:41

## 2025-01-19 RX ADMIN — IOHEXOL 100 ML: 350 INJECTION, SOLUTION INTRAVENOUS at 16:11

## 2025-01-19 NOTE — ED PROVIDER NOTES
Time reflects when diagnosis was documented in both MDM as applicable and the Disposition within this note       Time User Action Codes Description Comment    1/19/2025  5:15 PM Rima Heredia Add [N83.202] Cyst of left ovary           ED Disposition       ED Disposition   Discharge    Condition   Good    Date/Time   Sun Jan 19, 2025  5:15 PM    Comment   Cece Cintron discharge to home/self care.                   Assessment & Plan       Medical Decision Making  74-year-old female presents to the emergency department with a 2-week history of gradually worsening left lower quadrant pain.  She states the pain seems to get worse if she eats or tries have a bowel movement.  She has not noticed any bloody bowel movements or black stools.  No fevers, chills.  She has had some decreased appetite and nausea.  She had 1 episode of vomiting yesterday.  She has not taken anything for the discomfort.  No prior history of similar symptoms.  On exam she looks well in no distress.  Vital signs are unremarkable.  She does have some tenderness in the suprapubic and left lower quadrant without peritoneal signs.  Differential includes but is not limited to UTI, diverticulitis, kidney stone.  Will check CBC, CMP, lipase, urinalysis and CT abdomen pelvis.  Currently patient is declining anything for pain or nausea.  Will reassess    Amount and/or Complexity of Data Reviewed  Labs: ordered. Decision-making details documented in ED Course.  Radiology: ordered.    Risk  Prescription drug management.        ED Course as of 01/19/25 1718   Sun Jan 19, 2025   1434 Blood Pressure: 150/83   1434 Temperature: 97.8 °F (36.6 °C)   1434 Pulse: 76   1434 Respirations: 18   1434 SpO2: 96 %   1445 Color, UA: Yellow   1445 Leukocytes, UA(!): Trace   1445 Nitrite, UA: Negative   1445 Blood, UA: Negative   1450 WBC: 8.00   1451 Hemoglobin: 13.7   1505 RBC Urine: 1-2   1507 WBC, UA: 1-2   1507 Bacteria, UA: Occasional   1525 LIPASE: 35   1525 Sodium:  138   1525 Potassium: 4.0   1525 Creatinine: 0.80   1525 GLUCOSE: 98   1525 Calcium(!): 10.3   1525 AST: 20   1525 ALT: 15   1525 ALK PHOS: 73   1525 Total Bilirubin: 0.82   1712 CT abdomen pelvis:No acute intra-abdominal finding.     Left adnexal cystic areas measuring 1.7 cm and 1.9 cm. Nonemergent pelvic ultrasound characterization and GYN follow-up given the postmenopausal status.      1715 Results of CT explained to patient and patient's daughter and need for gynecologic follow-up.  Will place ambulatory referral       Medications   sodium chloride 0.9 % bolus 1,000 mL (1,000 mL Intravenous New Bag 1/19/25 1441)   iohexol (OMNIPAQUE) 350 MG/ML injection (MULTI-DOSE) 100 mL (100 mL Intravenous Given 1/19/25 1611)       ED Risk Strat Scores                          SBIRT 20yo+      Flowsheet Row Most Recent Value   Initial Alcohol Screen: US AUDIT-C     1. How often do you have a drink containing alcohol? 0 Filed at: 01/19/2025 1410   2. How many drinks containing alcohol do you have on a typical day you are drinking?  0 Filed at: 01/19/2025 1410   3a. Male UNDER 65: How often do you have five or more drinks on one occasion? 0 Filed at: 01/19/2025 1410   3b. FEMALE Any Age, or MALE 65+: How often do you have 4 or more drinks on one occassion? 0 Filed at: 01/19/2025 1410   Audit-C Score 0 Filed at: 01/19/2025 1410   RAY: How many times in the past year have you...    Used an illegal drug or used a prescription medication for non-medical reasons? Never Filed at: 01/19/2025 1410                            History of Present Illness       Chief Complaint   Patient presents with    Abdominal Pain     Pt complaining of mid abdominal pain x2 weeks and vomiting this am. Pt reports worsening pain with bowel movements.        Past Medical History:   Diagnosis Date    Age-related osteoporosis without current pathological fracture 7/25/2018    Class 1 obesity in adult 3/26/2018    Gastroesophageal reflux disease without  esophagitis 3/26/2018    Hernia, hiatal     Insomnia     Osteoporosis     Recurrent UTI 5/3/2018    Stroke (HCC)     Ulcer, gastric, acute     Vitamin D deficiency       Past Surgical History:   Procedure Laterality Date    BREAST BIOPSY Left     1997    HERNIA REPAIR      umbilical hernia     TUBAL LIGATION        Family History   Problem Relation Age of Onset    Diabetes Mother     Heart disease Father     COPD Father     Prostate cancer Brother         age unknown    No Known Problems Sister     No Known Problems Daughter     No Known Problems Maternal Grandmother     No Known Problems Maternal Grandfather     No Known Problems Paternal Grandmother     No Known Problems Paternal Grandfather     No Known Problems Sister     No Known Problems Sister     No Known Problems Sister     No Known Problems Sister     No Known Problems Sister       Social History     Tobacco Use    Smoking status: Never    Smokeless tobacco: Never    Tobacco comments:     exposed to heavy smoke and chemicals   Vaping Use    Vaping status: Never Used   Substance Use Topics    Alcohol use: No    Drug use: No      E-Cigarette/Vaping    E-Cigarette Use Never User       E-Cigarette/Vaping Substances    Nicotine No     THC No     CBD No     Flavoring No     Other No     Unknown No       I have reviewed and agree with the history as documented.     74-year-old female with history of GERD, CVA presents to the ED with a 2-week history of gradually worsening left lower quadrant pain.  She states the pain is especially worse when she tries to have a bowel movement.  The bowel movements are otherwise normal.  No blood noted.  Over the last 2 days she has had nausea.  Yesterday she had vomiting.  No vomiting today.  No fevers, chills or urinary symptoms.  No prior history of similar symptoms.      History provided by:  Patient and relative   used: No    Abdominal Pain  Pain location:  LLQ  Pain quality: pressure    Pain radiates to:   Does not radiate  Onset quality:  Gradual  Duration:  2 weeks  Timing:  Intermittent  Progression:  Worsening  Chronicity:  New  Context: awakening from sleep, eating and previous surgery    Context: not alcohol use, not diet changes, not recent illness, not sick contacts, not suspicious food intake and not trauma    Relieved by:  None tried  Worsened by:  Eating and bowel movements  Ineffective treatments:  None tried  Associated symptoms: anorexia, nausea and vomiting    Associated symptoms: no belching, no chest pain, no chills, no constipation, no cough, no diarrhea, no dysuria, no fatigue, no fever, no flatus, no hematemesis, no hematochezia, no hematuria, no melena, no shortness of breath and no sore throat    Vomiting:     Quality:  Stomach contents    Number of occurrences:  2    Timing:  Intermittent    Progression:  Unchanged  Risk factors: being elderly    Risk factors: no alcohol abuse, has not had multiple surgeries, no NSAID use, not obese and no recent hospitalization        Review of Systems   Constitutional:  Positive for appetite change. Negative for activity change, chills, diaphoresis, fatigue, fever and unexpected weight change.   HENT: Negative.  Negative for congestion, rhinorrhea and sore throat.    Eyes: Negative.  Negative for discharge, redness and itching.   Respiratory: Negative.  Negative for apnea, cough, chest tightness, shortness of breath and wheezing.    Cardiovascular:  Negative for chest pain, palpitations and leg swelling.   Gastrointestinal:  Positive for abdominal pain, anorexia, nausea and vomiting. Negative for abdominal distention, blood in stool, constipation, diarrhea, flatus, hematemesis, hematochezia and melena.   Endocrine: Negative.    Genitourinary: Negative.  Negative for dysuria, flank pain, frequency, hematuria and urgency.   Musculoskeletal: Negative.  Negative for back pain.   Skin: Negative.    Allergic/Immunologic: Negative.    Neurological: Negative.   Negative for dizziness, syncope, weakness, light-headedness, numbness and headaches.   Hematological: Negative.    All other systems reviewed and are negative.          Objective       ED Triage Vitals   Temperature Pulse Blood Pressure Respirations SpO2 Patient Position - Orthostatic VS   01/19/25 1408 01/19/25 1408 01/19/25 1409 01/19/25 1408 01/19/25 1408 --   97.8 °F (36.6 °C) 76 150/83 18 96 %       Temp Source Heart Rate Source BP Location FiO2 (%) Pain Score    01/19/25 1408 01/19/25 1408 -- -- --    Oral Monitor         Vitals      Date and Time Temp Pulse SpO2 Resp BP Pain Score FACES Pain Rating User   01/19/25 1700 -- 68 97 % 16 139/67 -- -- JL   01/19/25 1630 -- 71 98 % 18 142/72 -- -- JL   01/19/25 1409 -- -- -- -- 150/83 -- -- RM   01/19/25 1408 97.8 °F (36.6 °C) 76 96 % 18 -- -- -- RM            Physical Exam    Results Reviewed       Procedure Component Value Units Date/Time    Comprehensive metabolic panel [[id number]]  (Abnormal) Collected: 01/19/25 1441    Lab Status: Final result Specimen: Blood from Arm, Left Updated: 01/19/25 1519     Sodium 138 mmol/L      Potassium 4.0 mmol/L      Chloride 104 mmol/L      CO2 24 mmol/L      ANION GAP 10 mmol/L      BUN 9 mg/dL      Creatinine 0.80 mg/dL      Glucose 98 mg/dL      Calcium 10.3 mg/dL      AST 20 U/L      ALT 15 U/L      Alkaline Phosphatase 73 U/L      Total Protein 7.9 g/dL      Albumin 4.6 g/dL      Total Bilirubin 0.82 mg/dL      eGFR 73 ml/min/1.73sq m     Narrative:      National Kidney [employer] Foundation guidelines for Chronic Kidney Disease (CKD):     Stage 1 with normal or high GFR (GFR > 90 mL/min/1.73 square meters)    Stage 2 Mild CKD (GFR = 60-89 mL/min/1.73 square meters)    Stage 3A Moderate CKD (GFR = 45-59 mL/min/1.73 square meters)    Stage 3B Moderate CKD (GFR = 30-44 mL/min/1.73 square meters)    Stage 4 Severe CKD (GFR = 15-29 mL/min/1.73 square meters)    Stage 5 End Stage CKD (GFR <15 mL/min/1.73 square meters)  Note: GFR  calculation is accurate only with a steady state creatinine    Lipase [[id number]]  (Normal) Collected: 01/19/25 1441    Lab Status: Final result Specimen: Blood from Arm, Left Updated: 01/19/25 1519     Lipase 35 u/L     Urine Microscopic [[id number]]  (Abnormal) Collected: 01/19/25 1444    Lab Status: Final result Specimen: Urine, Clean Catch Updated: 01/19/25 1503     RBC, UA 1-2 /hpf      WBC, UA 1-2 /hpf      Epithelial Cells Occasional /hpf      Bacteria, UA Occasional /hpf      MUCUS THREADS Occasional     Amorphous Crystals, UA Occasional    CBC and differential [[id number]]  (Abnormal) Collected: 01/19/25 1441    Lab Status: Final result Specimen: Blood from Arm, Left Updated: 01/19/25 1449     WBC 8.00 Thousand/uL      RBC 5.04 Million/uL      Hemoglobin 13.7 g/dL      Hematocrit 40.7 %      MCV 81 fL      MCH 27.2 pg      MCHC 33.7 g/dL      RDW 13.6 %      MPV 8.2 fL      Platelets 241 Thousands/uL      nRBC 0 /100 WBCs      Segmented % 76 %      Immature Grans % 0 %      Lymphocytes % 14 %      Monocytes % 6 %      Eosinophils Relative 3 %      Basophils Relative 1 %      Absolute Neutrophils 6.18 Thousands/µL      Absolute Immature Grans 0.02 Thousand/uL      Absolute Lymphocytes 1.08 Thousands/µL      Absolute Monocytes 0.46 Thousand/µL      Eosinophils Absolute 0.22 Thousand/µL      Basophils Absolute 0.04 Thousands/µL     Urine Macroscopic, POC [[id number]]  (Abnormal) Collected: 01/19/25 1444    Lab Status: Final result Specimen: Urine Updated: 01/19/25 1445     Color, UA Yellow     Clarity, UA Clear     pH, UA 6.5     Leukocytes, UA Trace     Nitrite, UA Negative     Protein, UA 30 (1+) mg/dl      Glucose, UA Negative mg/dl      Ketones, UA Negative mg/dl      Urobilinogen, UA 0.2 E.U./dl      Bilirubin, UA Negative     Occult Blood, UA Negative     Specific Gravity, UA 1.015    Narrative:      CLINITEK RESULT            CT abdomen pelvis with contrast   Final Interpretation by Pk Savage,  MD (01/19 8456)   No acute intra-abdominal finding.      Left adnexal cystic areas measuring 1.7 cm and 1.9 cm. Nonemergent pelvic ultrasound characterization and GYN follow-up given the postmenopausal status.         Workstation performed: [id number]             Procedures    ED Medication and Procedure Management   Prior to Admission Medications   Prescriptions Last Dose Informant Patient Reported? Taking?   D-Mannose POWD  Self No No   Sig: Take 1 Dose by mouth in the morning   Multiple Vitamin (multivitamin) tablet  Self Yes No   Sig: Take 1 tablet by mouth daily   b complex vitamins tablet  Self Yes No   Sig: Take 1 tablet by mouth daily     calcium carbonate-vitamin D (OSCAL-D) 500 mg-200 units per tablet  Self Yes No   Sig: Take 1 tablet by mouth daily with breakfast   estradiol (ESTRACE) 0.1 mg/g vaginal cream  Self No No   Sig: Apply a pea-sized amount to your finger and placed around the urethral opening and around the vaginal opening twice a week on Wednesday and Sunday   gabapentin (NEURONTIN) 100 mg capsule   No No   Sig: TAKE ONE CAPSULE BY MOUTH DAILY AT BEDTIME   mirtazapine (REMERON) 7.5 MG tablet   No No   Sig: Take 1 tablet (7.5 mg total) by mouth daily at bedtime   omeprazole (PriLOSEC) 20 mg delayed release capsule   No No   Sig: TAKE ONE CAPSULE BY MOUTH DAILY   rosuvastatin (CRESTOR) 5 mg tablet   No No   Sig: TAKE ONE TABLET BY MOUTH EVERY DAY   terazosin (HYTRIN) 1 mg capsule  Self No No   Sig: TAKE ONE CAPSULE BY MOUTH DAILY AT BEDTIME      Facility-Administered Medications: None     Patient's Medications   Discharge Prescriptions    NAPROXEN (NAPROSYN) 500 MG TABLET    Take 1 tablet (500 mg total) by mouth every 12 (twelve) hours as needed for moderate pain or mild pain       Start Date: 1/19/2025 End Date: --       Order Dose: 500 mg       Quantity: 15 tablet    Refills: 0    ONDANSETRON (ZOFRAN-ODT) 4 MG DISINTEGRATING TABLET    Take 1 tablet (4 mg total) by mouth every 8 (eight) hours as  needed for vomiting or nausea       Start Date: 1/19/2025 End Date: --       Order Dose: 4 mg       Quantity: 10 tablet    Refills: 0       ED SEPSIS DOCUMENTATION   Time reflects when diagnosis was documented in both MDM as applicable and the Disposition within this note       Time User Action Codes Description Comment    1/19/2025  5:15 PM Rima Heredia Add [N83.202] Cyst of left ovary                  Rima Heredia, DO  01/19/25 1711

## 2025-01-30 ENCOUNTER — CONSULT (OUTPATIENT)
Age: 75
End: 2025-01-30
Payer: COMMERCIAL

## 2025-01-30 VITALS
SYSTOLIC BLOOD PRESSURE: 118 MMHG | DIASTOLIC BLOOD PRESSURE: 82 MMHG | BODY MASS INDEX: 32.68 KG/M2 | HEIGHT: 55 IN | WEIGHT: 141.2 LBS

## 2025-01-30 DIAGNOSIS — N83.202 LEFT OVARIAN CYST: Primary | ICD-10-CM

## 2025-01-30 DIAGNOSIS — Z78.0 POSTMENOPAUSAL STATE: ICD-10-CM

## 2025-01-30 PROCEDURE — 99203 OFFICE O/P NEW LOW 30 MIN: CPT | Performed by: OBSTETRICS & GYNECOLOGY

## 2025-01-31 NOTE — PROGRESS NOTES
Cece Cintron  1950  [facility]  [facility]  [address]  [address]  [facility] [address] [phone]    Chief Complaint   Patient presents with    Follow-up     New Patient ED follow-up  ED visit 25 pain gradually worse over 2 weeks.   CT abdomen and pelvis 25 2 cysts on left ovary  Denies pain durrently    Hx: chronic hx of UTI's       Assessment & Plan :      1. Left ovarian cyst  US pelvis complete w transvaginal      2. Postmenopausal state  Ambulatory Referral to Obstetrics / Gynecology        I reassured patient that these cysts are most likely incidental findings that may have been there for year.  Given simple features I advised patient to have pelvic sono in 2-3 months.  No further follow up necessary if cysts resolve or do not change in size/features.          History of Present Illness:  Patient is a 73 yo postmenopausal female who presents with her daughter for ED follow up.  Patient c/o experiencing periumbilical pain radiating into pelvis for 3 nights in a row.  She went to the ED due to recurrence and intensity.  She has a long history of GI issues and thought this may be a bout of diverticulitis.  CT scan revealed 2 simple less than 2 cm cysts on left ovary.  Pain spontaneously resolved and has not recurred.  She denies any VB or change in discharge.  She is not sexually active.  + history of tubal ligation.        Past Medical History:   Diagnosis Date    Age-related osteoporosis without current pathological fracture 2018    Class 1 obesity in adult 3/26/2018    Gastroesophageal reflux disease without esophagitis 3/26/2018    Hernia, hiatal     Insomnia     Osteoporosis     Recurrent UTI 5/3/2018    Stroke (HCC)     Ulcer, gastric, acute     Vitamin D deficiency        Past Surgical History:   Procedure Laterality Date    BREAST BIOPSY Left         HERNIA REPAIR      umbilical hernia     TUBAL LIGATION         OB History           3    Para   3    Term   3       0    AB   0    Living   3         SAB   0    IAB   0    Ectopic   0    Multiple   0    Live Births   3                 Family History   Problem Relation Age of Onset    Diabetes Mother     Heart disease Father     COPD Father     Prostate cancer Brother         age unknown    No Known Problems Sister     No Known Problems Daughter     No Known Problems Maternal Grandmother     No Known Problems Maternal Grandfather     No Known Problems Paternal Grandmother     No Known Problems Paternal Grandfather     No Known Problems Sister     No Known Problems Sister     No Known Problems Sister     No Known Problems Sister     No Known Problems Sister        Social History     Socioeconomic History    Marital status: /Civil Union     Spouse name: Not on file    Number of children: Not on file    Years of education: Not on file    Highest education level: Not on file   Occupational History    Not on file   Tobacco Use    Smoking status: Never    Smokeless tobacco: Never    Tobacco comments:     exposed to heavy smoke and chemicals   Vaping Use    Vaping status: Never Used   Substance and Sexual Activity    Alcohol use: No    Drug use: No    Sexual activity: Never   Other Topics Concern    Not on file   Social History Narrative    Consumes 1 cup of coffee per day        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      Social Drivers of Health     Financial Resource Strain: Low Risk  (2023)    Overall Financial Resource Strain (CARDIA)     Difficulty of Paying Living Expenses: Not very hard   Food Insecurity: No Food Insecurity (2024)    Nursing - Inadequate Food Risk Classification     Worried About Running Out of Food in the Last Year: Never true     Ran Out of Food in the Last Year: Never true     Ran Out of Food in the Last Year: Not on file   Transportation Needs: No Transportation Needs (2024)    PRAPARE - Transportation     Lack of  Transportation (Medical): No     Lack of Transportation (Non-Medical): No   Physical Activity: Not on file   Stress: Not on file   Social Connections: Not on file   Intimate Partner Violence: Not on file   Housing Stability: Low Risk  (9/13/2024)    Housing Stability Vital Sign     Unable to Pay for Housing in the Last Year: No     Number of Times Moved in the Last Year: 0     Homeless in the Last Year: No         Current Outpatient Medications:     b complex vitamins tablet, Take 1 tablet by mouth daily  , Disp: , Rfl:     calcium carbonate-vitamin D (OSCAL-D) 500 mg-200 units per tablet, Take 1 tablet by mouth daily with breakfast, Disp: , Rfl:     estradiol (ESTRACE) 0.1 mg/g vaginal cream, Apply a pea-sized amount to your finger and placed around the urethral opening and around the vaginal opening twice a week on Wednesday and Sunday, Disp: 42.5 g, Rfl: 10    gabapentin (NEURONTIN) 100 mg capsule, TAKE ONE CAPSULE BY MOUTH DAILY AT BEDTIME, Disp: 90 capsule, Rfl: 1    mirtazapine (REMERON) 7.5 MG tablet, Take 1 tablet (7.5 mg total) by mouth daily at bedtime, Disp: 90 tablet, Rfl: 0    Multiple Vitamin (multivitamin) tablet, Take 1 tablet by mouth daily, Disp: , Rfl:     naproxen (NAPROSYN) 500 mg tablet, Take 1 tablet (500 mg total) by mouth every 12 (twelve) hours as needed for moderate pain or mild pain, Disp: 15 tablet, Rfl: 0    omeprazole (PriLOSEC) 20 mg delayed release capsule, TAKE ONE CAPSULE BY MOUTH DAILY, Disp: 90 capsule, Rfl: 1    ondansetron (ZOFRAN-ODT) 4 mg disintegrating tablet, Take 1 tablet (4 mg total) by mouth every 8 (eight) hours as needed for vomiting or nausea, Disp: 10 tablet, Rfl: 0    rosuvastatin (CRESTOR) 5 mg tablet, TAKE ONE TABLET BY MOUTH EVERY DAY, Disp: 90 tablet, Rfl: 1    terazosin (HYTRIN) 1 mg capsule, TAKE ONE CAPSULE BY MOUTH DAILY AT BEDTIME, Disp: 90 capsule, Rfl: 1    D-Mannose POWD, Take 1 Dose by mouth in the morning (Patient not taking: Reported on 1/30/2025),  Disp: 100 g, Rfl: 1    Allergies   Allergen Reactions    Ciprofloxacin Shortness Of Breath    Sulfa Antibiotics Shortness Of Breath     Stress and rash.    Nitrofurantoin Nausea Only    Other Nausea Only     macrobid       Review of Systems   Constitutional: Negative for chills, fever, malaise/fatigue and weight loss.   Gastrointestinal:  Negative for bloating, change in bowel habit, nausea and vomiting.   Genitourinary:  Negative for dysuria, flank pain, non-menstrual bleeding and pelvic pain.   Neurological:  Negative for dizziness, headaches, light-headedness and weakness.     Vitals:    01/30/25 1411   BP: 118/82       Physical Exam  Constitutional:       Appearance: Normal appearance.   HENT:      Head: Normocephalic.   Cardiovascular:      Rate and Rhythm: Normal rate and regular rhythm.   Pulmonary:      Effort: Pulmonary effort is normal.   Abdominal:      General: There is no distension.   Musculoskeletal:         General: No swelling.   Neurological:      General: No focal deficit present.      Mental Status: She is alert and oriented to person, place, and time.   Skin:     General: Skin is warm and dry.      Coloration: Skin is not jaundiced or pale.   Psychiatric:         Mood and Affect: Mood normal.         Behavior: Behavior normal.   Vitals reviewed.      CT abd/pelvis 1/19/2025:  FINDINGS:   ABDOMEN   LOWER CHEST: No clinically significant abnormality in the visualized lower chest.   LIVER/BILIARY TREE: Unremarkable.   GALLBLADDER: No calcified gallstones. No pericholecystic inflammatory change.   SPLEEN: Unremarkable.   PANCREAS: Unremarkable.   ADRENAL GLANDS: Unremarkable.   KIDNEYS/URETERS: Unremarkable. No hydronephrosis.   STOMACH AND BOWEL: Unremarkable.   APPENDIX: No findings to suggest appendicitis.   ABDOMINOPELVIC CAVITY: No ascites. No pneumoperitoneum. No lymphadenopathy.   VESSELS: Unremarkable for patient's age.   PELVIS   REPRODUCTIVE ORGANS: Uterus is unremarkable. Left adnexal  cystic areas measuring 1.7 cm and 1.9 cm.   URINARY BLADDER: Unremarkable.   ABDOMINAL WALL/INGUINAL REGIONS: Unremarkable.   BONES: Mild superior endplate wedging of T12 unchanged. No acute fracture or suspicious osseous lesion.   IMPRESSION:  No acute intra-abdominal finding.   Left adnexal cystic areas measuring 1.7 cm and 1.9 cm.

## 2025-02-01 DIAGNOSIS — G47.9 SLEEP DISTURBANCE: ICD-10-CM

## 2025-02-03 RX ORDER — MIRTAZAPINE 7.5 MG/1
7.5 TABLET, FILM COATED ORAL
Qty: 30 TABLET | Refills: 0 | Status: SHIPPED | OUTPATIENT
Start: 2025-02-03

## 2025-02-19 ENCOUNTER — RA CDI HCC (OUTPATIENT)
Dept: OTHER | Facility: HOSPITAL | Age: 75
End: 2025-02-19

## 2025-03-03 DIAGNOSIS — G47.9 SLEEP DISTURBANCE: ICD-10-CM

## 2025-03-04 RX ORDER — MIRTAZAPINE 7.5 MG/1
7.5 TABLET, FILM COATED ORAL
Qty: 30 TABLET | Refills: 0 | Status: SHIPPED | OUTPATIENT
Start: 2025-03-04

## 2025-03-27 ENCOUNTER — VBI (OUTPATIENT)
Dept: ADMINISTRATIVE | Facility: OTHER | Age: 75
End: 2025-03-27

## 2025-03-27 NOTE — TELEPHONE ENCOUNTER
03/27/25 1:47 PM     Chart reviewed for Mammogram was/were not submitted to the patient's insurance.     Kayla Field MA   [facility]

## 2025-03-30 DIAGNOSIS — K21.9 GASTROESOPHAGEAL REFLUX DISEASE WITHOUT ESOPHAGITIS: ICD-10-CM

## 2025-03-30 RX ORDER — OMEPRAZOLE 20 MG/1
20 CAPSULE, DELAYED RELEASE ORAL DAILY
Qty: 90 CAPSULE | Refills: 1 | Status: SHIPPED | OUTPATIENT
Start: 2025-03-30

## 2025-05-08 ENCOUNTER — TELEPHONE (OUTPATIENT)
Age: 75
End: 2025-05-08

## 2025-05-29 DIAGNOSIS — G47.9 SLEEP DISTURBANCE: ICD-10-CM

## 2025-05-30 RX ORDER — MIRTAZAPINE 7.5 MG/1
7.5 TABLET, FILM COATED ORAL
Qty: 90 TABLET | Refills: 0 | Status: SHIPPED | OUTPATIENT
Start: 2025-05-30

## 2025-06-27 ENCOUNTER — VBI (OUTPATIENT)
Dept: ADMINISTRATIVE | Facility: OTHER | Age: 75
End: 2025-06-27

## 2025-06-27 NOTE — TELEPHONE ENCOUNTER
06/27/25 2:43 PM     Chart reviewed for Mammogram ; nothing is submitted to the patient's insurance at this time.     Brianda Cloud MA   [facility]

## 2025-07-30 ENCOUNTER — TELEPHONE (OUTPATIENT)
Age: 75
End: 2025-07-30

## 2025-08-06 ENCOUNTER — OFFICE VISIT (OUTPATIENT)
Dept: FAMILY MEDICINE CLINIC | Facility: CLINIC | Age: 75
End: 2025-08-06
Payer: COMMERCIAL

## 2025-08-06 ENCOUNTER — APPOINTMENT (OUTPATIENT)
Dept: LAB | Facility: HOSPITAL | Age: 75
End: 2025-08-06
Payer: COMMERCIAL

## 2025-08-06 VITALS
HEART RATE: 68 BPM | TEMPERATURE: 97.7 F | RESPIRATION RATE: 16 BRPM | DIASTOLIC BLOOD PRESSURE: 82 MMHG | OXYGEN SATURATION: 97 % | WEIGHT: 145 LBS | HEIGHT: 55 IN | BODY MASS INDEX: 33.56 KG/M2 | SYSTOLIC BLOOD PRESSURE: 122 MMHG

## 2025-08-06 DIAGNOSIS — E78.2 MIXED HYPERLIPIDEMIA: ICD-10-CM

## 2025-08-06 DIAGNOSIS — G47.9 SLEEP DISTURBANCE: ICD-10-CM

## 2025-08-06 DIAGNOSIS — K21.9 GASTROESOPHAGEAL REFLUX DISEASE WITHOUT ESOPHAGITIS: ICD-10-CM

## 2025-08-06 DIAGNOSIS — Z12.31 ENCOUNTER FOR SCREENING MAMMOGRAM FOR MALIGNANT NEOPLASM OF BREAST: ICD-10-CM

## 2025-08-06 DIAGNOSIS — E55.9 VITAMIN D DEFICIENCY: ICD-10-CM

## 2025-08-06 DIAGNOSIS — Z12.11 SCREENING FOR COLON CANCER: ICD-10-CM

## 2025-08-06 DIAGNOSIS — Z00.00 MEDICARE ANNUAL WELLNESS VISIT, SUBSEQUENT: ICD-10-CM

## 2025-08-06 DIAGNOSIS — M81.0 AGE-RELATED OSTEOPOROSIS WITHOUT CURRENT PATHOLOGICAL FRACTURE: Primary | ICD-10-CM

## 2025-08-06 LAB
ALBUMIN SERPL BCG-MCNC: 4.7 G/DL (ref 3.5–5)
ALP SERPL-CCNC: 77 U/L (ref 34–104)
ALT SERPL W P-5'-P-CCNC: 17 U/L (ref 7–52)
ANION GAP SERPL CALCULATED.3IONS-SCNC: 7 MMOL/L (ref 4–13)
AST SERPL W P-5'-P-CCNC: 25 U/L (ref 13–39)
BILIRUB SERPL-MCNC: 0.75 MG/DL (ref 0.2–1)
BUN SERPL-MCNC: 11 MG/DL (ref 5–25)
CALCIUM SERPL-MCNC: 10.8 MG/DL (ref 8.4–10.2)
CHLORIDE SERPL-SCNC: 103 MMOL/L (ref 96–108)
CO2 SERPL-SCNC: 28 MMOL/L (ref 21–32)
CREAT SERPL-MCNC: 0.81 MG/DL (ref 0.6–1.3)
GFR SERPL CREATININE-BSD FRML MDRD: 72 ML/MIN/1.73SQ M
GLUCOSE P FAST SERPL-MCNC: 100 MG/DL (ref 65–99)
POTASSIUM SERPL-SCNC: 4.4 MMOL/L (ref 3.5–5.3)
PROT SERPL-MCNC: 7.7 G/DL (ref 6.4–8.4)
SODIUM SERPL-SCNC: 138 MMOL/L (ref 135–147)

## 2025-08-06 PROCEDURE — G2211 COMPLEX E/M VISIT ADD ON: HCPCS | Performed by: NURSE PRACTITIONER

## 2025-08-06 PROCEDURE — 99214 OFFICE O/P EST MOD 30 MIN: CPT | Performed by: NURSE PRACTITIONER

## 2025-08-06 PROCEDURE — 80053 COMPREHEN METABOLIC PANEL: CPT | Performed by: NURSE PRACTITIONER

## 2025-08-06 PROCEDURE — G0439 PPPS, SUBSEQ VISIT: HCPCS | Performed by: NURSE PRACTITIONER
